# Patient Record
Sex: FEMALE | Race: ASIAN | NOT HISPANIC OR LATINO | Employment: OTHER | URBAN - METROPOLITAN AREA
[De-identification: names, ages, dates, MRNs, and addresses within clinical notes are randomized per-mention and may not be internally consistent; named-entity substitution may affect disease eponyms.]

---

## 2018-10-25 ENCOUNTER — CONVERSION ENCOUNTER (OUTPATIENT)
Dept: FAMILY MEDICINE CLINIC | Facility: CLINIC | Age: 58
End: 2018-10-25

## 2018-10-25 ENCOUNTER — OFFICE VISIT CONVERTED (OUTPATIENT)
Dept: FAMILY MEDICINE CLINIC | Facility: CLINIC | Age: 58
End: 2018-10-25
Attending: NURSE PRACTITIONER

## 2018-11-07 ENCOUNTER — OFFICE VISIT CONVERTED (OUTPATIENT)
Dept: ORTHOPEDIC SURGERY | Facility: CLINIC | Age: 58
End: 2018-11-07
Attending: ORTHOPAEDIC SURGERY

## 2018-11-07 ENCOUNTER — CONVERSION ENCOUNTER (OUTPATIENT)
Dept: ORTHOPEDIC SURGERY | Facility: CLINIC | Age: 58
End: 2018-11-07

## 2019-04-25 ENCOUNTER — HOSPITAL ENCOUNTER (OUTPATIENT)
Dept: LAB | Facility: HOSPITAL | Age: 59
Discharge: HOME | End: 2019-04-25
Attending: NURSE PRACTITIONER

## 2019-04-25 ENCOUNTER — HOSPITAL ENCOUNTER (OUTPATIENT)
Dept: OTHER | Facility: HOSPITAL | Age: 59
Discharge: HOME | End: 2019-04-25
Attending: NURSE PRACTITIONER

## 2019-04-25 ENCOUNTER — CONVERSION ENCOUNTER (OUTPATIENT)
Dept: FAMILY MEDICINE CLINIC | Facility: CLINIC | Age: 59
End: 2019-04-25

## 2019-04-25 ENCOUNTER — OFFICE VISIT CONVERTED (OUTPATIENT)
Dept: FAMILY MEDICINE CLINIC | Facility: CLINIC | Age: 59
End: 2019-04-25
Attending: NURSE PRACTITIONER

## 2019-04-25 ENCOUNTER — HOSPITAL ENCOUNTER (OUTPATIENT)
Dept: GENERAL RADIOLOGY | Facility: HOSPITAL | Age: 59
Discharge: HOME | End: 2019-04-25
Attending: NURSE PRACTITIONER

## 2019-04-25 LAB
ALBUMIN SERPL-MCNC: 4.6 G/DL (ref 3.5–5)
ALBUMIN/GLOB SERPL: 1.5 {RATIO} (ref 1.4–2.6)
ALP SERPL-CCNC: 61 U/L (ref 53–141)
ALT SERPL-CCNC: 27 U/L (ref 10–40)
ANION GAP SERPL CALC-SCNC: 14 MMOL/L (ref 8–19)
AST SERPL-CCNC: 24 U/L (ref 15–50)
BILIRUB SERPL-MCNC: 0.62 MG/DL (ref 0.2–1.3)
BUN SERPL-MCNC: 15 MG/DL (ref 5–25)
BUN/CREAT SERPL: 19 {RATIO} (ref 6–20)
CALCIUM SERPL-MCNC: 9.6 MG/DL (ref 8.7–10.4)
CHLORIDE SERPL-SCNC: 104 MMOL/L (ref 99–111)
CHOLEST SERPL-MCNC: 172 MG/DL (ref 107–200)
CHOLEST/HDLC SERPL: 4.1 {RATIO} (ref 3–6)
CONV CO2: 26 MMOL/L (ref 22–32)
CONV TOTAL PROTEIN: 7.7 G/DL (ref 6.3–8.2)
CREAT UR-MCNC: 0.79 MG/DL (ref 0.5–0.9)
GFR SERPLBLD BASED ON 1.73 SQ M-ARVRAT: >60 ML/MIN/{1.73_M2}
GLOBULIN UR ELPH-MCNC: 3.1 G/DL (ref 2–3.5)
GLUCOSE SERPL-MCNC: 105 MG/DL (ref 65–99)
HDLC SERPL-MCNC: 42 MG/DL (ref 40–60)
LDLC SERPL CALC-MCNC: 100 MG/DL (ref 70–100)
OSMOLALITY SERPL CALC.SUM OF ELEC: 291 MOSM/KG (ref 273–304)
POTASSIUM SERPL-SCNC: 4 MMOL/L (ref 3.5–5.3)
SODIUM SERPL-SCNC: 140 MMOL/L (ref 135–147)
TRIGL SERPL-MCNC: 152 MG/DL (ref 40–150)
VLDLC SERPL-MCNC: 30 MG/DL (ref 5–37)

## 2019-04-26 LAB
CONV HEPATITIS C AB WITH REFLEX TO CONFIRMATION: <0.1 S/CO RATIO (ref 0–0.9)
CONV HEPATITIS COMMENT: NORMAL

## 2019-04-30 LAB
CONV LAST MENSTURAL PERIOD: NORMAL
SPECIMEN SOURCE: NORMAL
SPECIMEN SOURCE: NORMAL
THIN PREP CVX: NORMAL

## 2019-05-09 ENCOUNTER — OFFICE VISIT CONVERTED (OUTPATIENT)
Dept: FAMILY MEDICINE CLINIC | Facility: CLINIC | Age: 59
End: 2019-05-09
Attending: NURSE PRACTITIONER

## 2019-05-09 ENCOUNTER — CONVERSION ENCOUNTER (OUTPATIENT)
Dept: FAMILY MEDICINE CLINIC | Facility: CLINIC | Age: 59
End: 2019-05-09

## 2019-06-03 ENCOUNTER — OFFICE VISIT CONVERTED (OUTPATIENT)
Dept: FAMILY MEDICINE CLINIC | Facility: CLINIC | Age: 59
End: 2019-06-03
Attending: NURSE PRACTITIONER

## 2019-07-08 ENCOUNTER — HOSPITAL ENCOUNTER (OUTPATIENT)
Dept: LAB | Facility: HOSPITAL | Age: 59
Discharge: HOME | End: 2019-07-08
Attending: INTERNAL MEDICINE

## 2019-07-08 LAB
ALBUMIN SERPL-MCNC: 4.6 G/DL (ref 3.5–5)
ALBUMIN/GLOB SERPL: 1.6 {RATIO} (ref 1.4–2.6)
ALP SERPL-CCNC: 64 U/L (ref 53–141)
ALT SERPL-CCNC: 30 U/L (ref 10–40)
ANION GAP SERPL CALC-SCNC: 19 MMOL/L (ref 8–19)
AST SERPL-CCNC: 26 U/L (ref 15–50)
BILIRUB SERPL-MCNC: 0.46 MG/DL (ref 0.2–1.3)
BUN SERPL-MCNC: 8 MG/DL (ref 5–25)
BUN/CREAT SERPL: 12 {RATIO} (ref 6–20)
CALCIUM SERPL-MCNC: 9.3 MG/DL (ref 8.7–10.4)
CHLORIDE SERPL-SCNC: 102 MMOL/L (ref 99–111)
CHOLEST SERPL-MCNC: 198 MG/DL (ref 107–200)
CHOLEST/HDLC SERPL: 4.8 {RATIO} (ref 3–6)
CONV CO2: 23 MMOL/L (ref 22–32)
CONV TOTAL PROTEIN: 7.5 G/DL (ref 6.3–8.2)
CREAT UR-MCNC: 0.68 MG/DL (ref 0.5–0.9)
GFR SERPLBLD BASED ON 1.73 SQ M-ARVRAT: >60 ML/MIN/{1.73_M2}
GLOBULIN UR ELPH-MCNC: 2.9 G/DL (ref 2–3.5)
GLUCOSE SERPL-MCNC: 101 MG/DL (ref 65–99)
HDLC SERPL-MCNC: 41 MG/DL (ref 40–60)
LDLC SERPL CALC-MCNC: 110 MG/DL (ref 70–100)
OSMOLALITY SERPL CALC.SUM OF ELEC: 288 MOSM/KG (ref 273–304)
POTASSIUM SERPL-SCNC: 4.4 MMOL/L (ref 3.5–5.3)
SODIUM SERPL-SCNC: 140 MMOL/L (ref 135–147)
TRIGL SERPL-MCNC: 237 MG/DL (ref 40–150)
VLDLC SERPL-MCNC: 47 MG/DL (ref 5–37)

## 2019-07-17 ENCOUNTER — HOSPITAL ENCOUNTER (OUTPATIENT)
Dept: GENERAL RADIOLOGY | Facility: HOSPITAL | Age: 59
Discharge: HOME | End: 2019-07-17
Attending: NURSE PRACTITIONER

## 2019-10-28 ENCOUNTER — OFFICE VISIT CONVERTED (OUTPATIENT)
Dept: FAMILY MEDICINE CLINIC | Facility: CLINIC | Age: 59
End: 2019-10-28
Attending: NURSE PRACTITIONER

## 2019-10-28 ENCOUNTER — CONVERSION ENCOUNTER (OUTPATIENT)
Dept: FAMILY MEDICINE CLINIC | Facility: CLINIC | Age: 59
End: 2019-10-28

## 2019-10-28 ENCOUNTER — HOSPITAL ENCOUNTER (OUTPATIENT)
Dept: LAB | Facility: HOSPITAL | Age: 59
Discharge: HOME | End: 2019-10-28
Attending: NURSE PRACTITIONER

## 2019-10-28 LAB
ALBUMIN SERPL-MCNC: 4.6 G/DL (ref 3.5–5)
ALBUMIN/GLOB SERPL: 1.5 {RATIO} (ref 1.4–2.6)
ALP SERPL-CCNC: 62 U/L (ref 53–141)
ALT SERPL-CCNC: 25 U/L (ref 10–40)
ANION GAP SERPL CALC-SCNC: 15 MMOL/L (ref 8–19)
AST SERPL-CCNC: 20 U/L (ref 15–50)
BASOPHILS # BLD AUTO: 0.05 10*3/UL (ref 0–0.2)
BASOPHILS NFR BLD AUTO: 1.1 % (ref 0–3)
BILIRUB SERPL-MCNC: 0.48 MG/DL (ref 0.2–1.3)
BUN SERPL-MCNC: 13 MG/DL (ref 5–25)
BUN/CREAT SERPL: 18 {RATIO} (ref 6–20)
CALCIUM SERPL-MCNC: 9.7 MG/DL (ref 8.7–10.4)
CHLORIDE SERPL-SCNC: 103 MMOL/L (ref 99–111)
CHOLEST SERPL-MCNC: 186 MG/DL (ref 107–200)
CHOLEST/HDLC SERPL: 4.7 {RATIO} (ref 3–6)
CONV ABS IMM GRAN: 0.02 10*3/UL (ref 0–0.2)
CONV CO2: 26 MMOL/L (ref 22–32)
CONV IMMATURE GRAN: 0.5 % (ref 0–1.8)
CONV TOTAL PROTEIN: 7.6 G/DL (ref 6.3–8.2)
CREAT UR-MCNC: 0.72 MG/DL (ref 0.5–0.9)
DEPRECATED RDW RBC AUTO: 40.6 FL (ref 36.4–46.3)
EOSINOPHIL # BLD AUTO: 0.17 10*3/UL (ref 0–0.7)
EOSINOPHIL # BLD AUTO: 3.8 % (ref 0–7)
ERYTHROCYTE [DISTWIDTH] IN BLOOD BY AUTOMATED COUNT: 12.5 % (ref 11.7–14.4)
GFR SERPLBLD BASED ON 1.73 SQ M-ARVRAT: >60 ML/MIN/{1.73_M2}
GLOBULIN UR ELPH-MCNC: 3 G/DL (ref 2–3.5)
GLUCOSE SERPL-MCNC: 105 MG/DL (ref 65–99)
HCT VFR BLD AUTO: 42 % (ref 37–47)
HDLC SERPL-MCNC: 40 MG/DL (ref 40–60)
HGB BLD-MCNC: 13.6 G/DL (ref 12–16)
LDLC SERPL CALC-MCNC: 95 MG/DL (ref 70–100)
LYMPHOCYTES # BLD AUTO: 1.72 10*3/UL (ref 1–5)
LYMPHOCYTES NFR BLD AUTO: 38.8 % (ref 20–45)
MCH RBC QN AUTO: 28.6 PG (ref 27–31)
MCHC RBC AUTO-ENTMCNC: 32.4 G/DL (ref 33–37)
MCV RBC AUTO: 88.2 FL (ref 81–99)
MONOCYTES # BLD AUTO: 0.36 10*3/UL (ref 0.2–1.2)
MONOCYTES NFR BLD AUTO: 8.1 % (ref 3–10)
NEUTROPHILS # BLD AUTO: 2.11 10*3/UL (ref 2–8)
NEUTROPHILS NFR BLD AUTO: 47.7 % (ref 30–85)
NRBC CBCN: 0 % (ref 0–0.7)
OSMOLALITY SERPL CALC.SUM OF ELEC: 290 MOSM/KG (ref 273–304)
PLATELET # BLD AUTO: 182 10*3/UL (ref 130–400)
PMV BLD AUTO: 11.9 FL (ref 9.4–12.3)
POTASSIUM SERPL-SCNC: 4.3 MMOL/L (ref 3.5–5.3)
RBC # BLD AUTO: 4.76 10*6/UL (ref 4.2–5.4)
SODIUM SERPL-SCNC: 140 MMOL/L (ref 135–147)
T4 FREE SERPL-MCNC: 1 NG/DL (ref 0.9–1.8)
TRIGL SERPL-MCNC: 255 MG/DL (ref 40–150)
TSH SERPL-ACNC: 2.3 M[IU]/L (ref 0.27–4.2)
VLDLC SERPL-MCNC: 51 MG/DL (ref 5–37)
WBC # BLD AUTO: 4.43 10*3/UL (ref 4.8–10.8)

## 2020-01-14 ENCOUNTER — OFFICE VISIT CONVERTED (OUTPATIENT)
Dept: FAMILY MEDICINE CLINIC | Facility: CLINIC | Age: 60
End: 2020-01-14
Attending: NURSE PRACTITIONER

## 2020-03-10 ENCOUNTER — OFFICE VISIT CONVERTED (OUTPATIENT)
Dept: FAMILY MEDICINE CLINIC | Facility: CLINIC | Age: 60
End: 2020-03-10
Attending: NURSE PRACTITIONER

## 2020-05-04 ENCOUNTER — OFFICE VISIT CONVERTED (OUTPATIENT)
Dept: FAMILY MEDICINE CLINIC | Facility: CLINIC | Age: 60
End: 2020-05-04
Attending: NURSE PRACTITIONER

## 2020-05-04 ENCOUNTER — HOSPITAL ENCOUNTER (OUTPATIENT)
Dept: FAMILY MEDICINE CLINIC | Facility: CLINIC | Age: 60
Discharge: HOME | End: 2020-05-04
Attending: NURSE PRACTITIONER

## 2020-05-06 LAB
AMOXICILLIN+CLAV SUSC ISLT: <=2
AMPICILLIN SUSC ISLT: <=2
AMPICILLIN+SULBAC SUSC ISLT: <=2
BACTERIA UR CULT: ABNORMAL
CEFAZOLIN SUSC ISLT: <=4
CEFEPIME SUSC ISLT: <=1
CEFTAZIDIME SUSC ISLT: <=1
CEFTRIAXONE SUSC ISLT: <=1
CEFUROXIME ORAL SUSC ISLT: 4
CEFUROXIME PARENTER SUSC ISLT: 4
CIPROFLOXACIN SUSC ISLT: <=0.25
ERTAPENEM SUSC ISLT: <=0.5
GENTAMICIN SUSC ISLT: <=1
LEVOFLOXACIN SUSC ISLT: <=0.12
NITROFURANTOIN SUSC ISLT: <=16
TETRACYCLINE SUSC ISLT: <=1
TMP SMX SUSC ISLT: <=20
TOBRAMYCIN SUSC ISLT: <=1

## 2020-07-02 ENCOUNTER — HOSPITAL ENCOUNTER (OUTPATIENT)
Dept: LAB | Facility: HOSPITAL | Age: 60
Discharge: HOME | End: 2020-07-02
Attending: INTERNAL MEDICINE

## 2020-07-02 LAB
ALBUMIN SERPL-MCNC: 4.5 G/DL (ref 3.5–5)
ALBUMIN/GLOB SERPL: 1.6 {RATIO} (ref 1.4–2.6)
ALP SERPL-CCNC: 57 U/L (ref 53–141)
ALT SERPL-CCNC: 28 U/L (ref 10–40)
ANION GAP SERPL CALC-SCNC: 17 MMOL/L (ref 8–19)
AST SERPL-CCNC: 22 U/L (ref 15–50)
BILIRUB SERPL-MCNC: 0.72 MG/DL (ref 0.2–1.3)
BUN SERPL-MCNC: 12 MG/DL (ref 5–25)
BUN/CREAT SERPL: 14 {RATIO} (ref 6–20)
CALCIUM SERPL-MCNC: 9.7 MG/DL (ref 8.7–10.4)
CHLORIDE SERPL-SCNC: 103 MMOL/L (ref 99–111)
CHOLEST SERPL-MCNC: 171 MG/DL (ref 107–200)
CHOLEST/HDLC SERPL: 3.6 {RATIO} (ref 3–6)
CONV CO2: 23 MMOL/L (ref 22–32)
CONV TOTAL PROTEIN: 7.3 G/DL (ref 6.3–8.2)
CREAT UR-MCNC: 0.84 MG/DL (ref 0.5–0.9)
GFR SERPLBLD BASED ON 1.73 SQ M-ARVRAT: >60 ML/MIN/{1.73_M2}
GLOBULIN UR ELPH-MCNC: 2.8 G/DL (ref 2–3.5)
GLUCOSE SERPL-MCNC: 99 MG/DL (ref 65–99)
HDLC SERPL-MCNC: 47 MG/DL (ref 40–60)
LDLC SERPL CALC-MCNC: 93 MG/DL (ref 70–100)
OSMOLALITY SERPL CALC.SUM OF ELEC: 288 MOSM/KG (ref 273–304)
POTASSIUM SERPL-SCNC: 4 MMOL/L (ref 3.5–5.3)
SODIUM SERPL-SCNC: 139 MMOL/L (ref 135–147)
TRIGL SERPL-MCNC: 153 MG/DL (ref 40–150)
VLDLC SERPL-MCNC: 31 MG/DL (ref 5–37)

## 2020-07-21 ENCOUNTER — HOSPITAL ENCOUNTER (OUTPATIENT)
Dept: LAB | Facility: HOSPITAL | Age: 60
Discharge: HOME | End: 2020-07-21
Attending: INTERNAL MEDICINE

## 2020-07-21 LAB — BNP SERPL-MCNC: 31 PG/ML (ref 0–900)

## 2020-07-22 LAB
T4 FREE SERPL-MCNC: 1.2 NG/DL (ref 0.9–1.8)
TSH SERPL-ACNC: 1.11 M[IU]/L (ref 0.27–4.2)

## 2020-10-01 ENCOUNTER — OFFICE VISIT CONVERTED (OUTPATIENT)
Dept: FAMILY MEDICINE CLINIC | Facility: CLINIC | Age: 60
End: 2020-10-01
Attending: NURSE PRACTITIONER

## 2021-01-05 ENCOUNTER — OFFICE VISIT CONVERTED (OUTPATIENT)
Dept: FAMILY MEDICINE CLINIC | Facility: CLINIC | Age: 61
End: 2021-01-05
Attending: NURSE PRACTITIONER

## 2021-01-25 ENCOUNTER — HOSPITAL ENCOUNTER (OUTPATIENT)
Dept: GENERAL RADIOLOGY | Facility: HOSPITAL | Age: 61
Discharge: HOME | End: 2021-01-25
Attending: NURSE PRACTITIONER

## 2021-01-25 ENCOUNTER — HOSPITAL ENCOUNTER (OUTPATIENT)
Dept: LAB | Facility: HOSPITAL | Age: 61
Discharge: HOME | End: 2021-01-25
Attending: INTERNAL MEDICINE

## 2021-01-25 LAB
ALBUMIN SERPL-MCNC: 4.7 G/DL (ref 3.5–5)
ALBUMIN/GLOB SERPL: 1.5 {RATIO} (ref 1.4–2.6)
ALP SERPL-CCNC: 68 U/L (ref 53–141)
ALT SERPL-CCNC: 32 U/L (ref 10–40)
ANION GAP SERPL CALC-SCNC: 16 MMOL/L (ref 8–19)
AST SERPL-CCNC: 20 U/L (ref 15–50)
BILIRUB SERPL-MCNC: 0.51 MG/DL (ref 0.2–1.3)
BUN SERPL-MCNC: 12 MG/DL (ref 5–25)
BUN/CREAT SERPL: 16 {RATIO} (ref 6–20)
CALCIUM SERPL-MCNC: 9.7 MG/DL (ref 8.7–10.4)
CHLORIDE SERPL-SCNC: 103 MMOL/L (ref 99–111)
CHOLEST SERPL-MCNC: 208 MG/DL (ref 107–200)
CHOLEST/HDLC SERPL: 3.9 {RATIO} (ref 3–6)
CONV CO2: 26 MMOL/L (ref 22–32)
CONV TOTAL PROTEIN: 7.8 G/DL (ref 6.3–8.2)
CREAT UR-MCNC: 0.76 MG/DL (ref 0.5–0.9)
GFR SERPLBLD BASED ON 1.73 SQ M-ARVRAT: >60 ML/MIN/{1.73_M2}
GLOBULIN UR ELPH-MCNC: 3.1 G/DL (ref 2–3.5)
GLUCOSE SERPL-MCNC: 95 MG/DL (ref 65–99)
HDLC SERPL-MCNC: 54 MG/DL (ref 40–60)
LDLC SERPL CALC-MCNC: 113 MG/DL (ref 70–100)
OSMOLALITY SERPL CALC.SUM OF ELEC: 292 MOSM/KG (ref 273–304)
POTASSIUM SERPL-SCNC: 4.1 MMOL/L (ref 3.5–5.3)
SODIUM SERPL-SCNC: 141 MMOL/L (ref 135–147)
TRIGL SERPL-MCNC: 207 MG/DL (ref 40–150)
VLDLC SERPL-MCNC: 41 MG/DL (ref 5–37)

## 2021-03-16 ENCOUNTER — HOSPITAL ENCOUNTER (OUTPATIENT)
Dept: VACCINE CLINIC | Facility: HOSPITAL | Age: 61
Discharge: HOME | End: 2021-03-16
Attending: INTERNAL MEDICINE

## 2021-04-01 ENCOUNTER — OFFICE VISIT CONVERTED (OUTPATIENT)
Dept: FAMILY MEDICINE CLINIC | Facility: CLINIC | Age: 61
End: 2021-04-01
Attending: NURSE PRACTITIONER

## 2021-04-02 ENCOUNTER — HOSPITAL ENCOUNTER (OUTPATIENT)
Dept: LAB | Facility: HOSPITAL | Age: 61
Discharge: HOME | End: 2021-04-02
Attending: NURSE PRACTITIONER

## 2021-04-02 LAB
ALBUMIN SERPL-MCNC: 4.4 G/DL (ref 3.5–5)
ALBUMIN/GLOB SERPL: 1.3 {RATIO} (ref 1.4–2.6)
ALP SERPL-CCNC: 62 U/L (ref 53–141)
ALT SERPL-CCNC: 35 U/L (ref 10–40)
ANION GAP SERPL CALC-SCNC: 16 MMOL/L (ref 8–19)
AST SERPL-CCNC: 27 U/L (ref 15–50)
BASOPHILS # BLD AUTO: 0.04 10*3/UL (ref 0–0.2)
BASOPHILS NFR BLD AUTO: 0.7 % (ref 0–3)
BILIRUB SERPL-MCNC: 0.66 MG/DL (ref 0.2–1.3)
BUN SERPL-MCNC: 8 MG/DL (ref 5–25)
BUN/CREAT SERPL: 10 {RATIO} (ref 6–20)
CALCIUM SERPL-MCNC: 9.9 MG/DL (ref 8.7–10.4)
CHLORIDE SERPL-SCNC: 102 MMOL/L (ref 99–111)
CHOLEST SERPL-MCNC: 170 MG/DL (ref 107–200)
CHOLEST/HDLC SERPL: 3.8 {RATIO} (ref 3–6)
CONV ABS IMM GRAN: 0.03 10*3/UL (ref 0–0.2)
CONV CO2: 29 MMOL/L (ref 22–32)
CONV IMMATURE GRAN: 0.5 % (ref 0–1.8)
CONV TOTAL PROTEIN: 7.9 G/DL (ref 6.3–8.2)
CREAT UR-MCNC: 0.78 MG/DL (ref 0.5–0.9)
DEPRECATED RDW RBC AUTO: 42 FL (ref 36.4–46.3)
EOSINOPHIL # BLD AUTO: 0.11 10*3/UL (ref 0–0.7)
EOSINOPHIL # BLD AUTO: 1.8 % (ref 0–7)
ERYTHROCYTE [DISTWIDTH] IN BLOOD BY AUTOMATED COUNT: 13 % (ref 11.7–14.4)
GFR SERPLBLD BASED ON 1.73 SQ M-ARVRAT: >60 ML/MIN/{1.73_M2}
GLOBULIN UR ELPH-MCNC: 3.5 G/DL (ref 2–3.5)
GLUCOSE SERPL-MCNC: 92 MG/DL (ref 65–99)
HCT VFR BLD AUTO: 41.5 % (ref 37–47)
HDLC SERPL-MCNC: 45 MG/DL (ref 40–60)
HGB BLD-MCNC: 13.7 G/DL (ref 12–16)
LDLC SERPL CALC-MCNC: 86 MG/DL (ref 70–100)
LYMPHOCYTES # BLD AUTO: 1.75 10*3/UL (ref 1–5)
LYMPHOCYTES NFR BLD AUTO: 29 % (ref 20–45)
MCH RBC QN AUTO: 29.3 PG (ref 27–31)
MCHC RBC AUTO-ENTMCNC: 33 G/DL (ref 33–37)
MCV RBC AUTO: 88.7 FL (ref 81–99)
MONOCYTES # BLD AUTO: 0.38 10*3/UL (ref 0.2–1.2)
MONOCYTES NFR BLD AUTO: 6.3 % (ref 3–10)
NEUTROPHILS # BLD AUTO: 3.72 10*3/UL (ref 2–8)
NEUTROPHILS NFR BLD AUTO: 61.7 % (ref 30–85)
NRBC CBCN: 0 % (ref 0–0.7)
OSMOLALITY SERPL CALC.SUM OF ELEC: 294 MOSM/KG (ref 273–304)
PLATELET # BLD AUTO: 218 10*3/UL (ref 130–400)
PMV BLD AUTO: 11.7 FL (ref 9.4–12.3)
POTASSIUM SERPL-SCNC: 4.1 MMOL/L (ref 3.5–5.3)
RBC # BLD AUTO: 4.68 10*6/UL (ref 4.2–5.4)
SODIUM SERPL-SCNC: 143 MMOL/L (ref 135–147)
T4 FREE SERPL-MCNC: 1.2 NG/DL (ref 0.9–1.8)
TRIGL SERPL-MCNC: 194 MG/DL (ref 40–150)
TSH SERPL-ACNC: 1.36 M[IU]/L (ref 0.27–4.2)
VLDLC SERPL-MCNC: 39 MG/DL (ref 5–37)
WBC # BLD AUTO: 6.03 10*3/UL (ref 4.8–10.8)

## 2021-04-06 ENCOUNTER — HOSPITAL ENCOUNTER (OUTPATIENT)
Dept: VACCINE CLINIC | Facility: HOSPITAL | Age: 61
Discharge: HOME | End: 2021-04-06
Attending: INTERNAL MEDICINE

## 2021-05-04 ENCOUNTER — CONVERSION ENCOUNTER (OUTPATIENT)
Dept: FAMILY MEDICINE CLINIC | Facility: CLINIC | Age: 61
End: 2021-05-04

## 2021-05-04 ENCOUNTER — HOSPITAL ENCOUNTER (OUTPATIENT)
Dept: FAMILY MEDICINE CLINIC | Facility: CLINIC | Age: 61
Discharge: HOME | End: 2021-05-04
Attending: NURSE PRACTITIONER

## 2021-05-04 ENCOUNTER — OFFICE VISIT CONVERTED (OUTPATIENT)
Dept: FAMILY MEDICINE CLINIC | Facility: CLINIC | Age: 61
End: 2021-05-04
Attending: NURSE PRACTITIONER

## 2021-05-04 LAB
BILIRUB UR QL STRIP: NEGATIVE
COLOR UR: NORMAL
CONV CLARITY OF URINE: CLEAR
CONV PROTEIN IN URINE BY AUTOMATED TEST STRIP: NEGATIVE
CONV UROBILINOGEN IN URINE BY AUTOMATED TEST STRIP: NORMAL
GLUCOSE UR QL: NEGATIVE
HGB UR QL STRIP: NEGATIVE
KETONES UR QL STRIP: NEGATIVE
LEUKOCYTE ESTERASE UR QL STRIP: NEGATIVE
NITRITE UR QL STRIP: NEGATIVE
PH UR STRIP.AUTO: 6.5 [PH]
SP GR UR: 1.02

## 2021-05-13 NOTE — PROGRESS NOTES
Progress Note      Patient Name: Panda Rinaldi   Patient ID: [id number]   Sex: Female   YOB: 1960    Primary Care Provider: Sadie JOSHI   Referring Provider: Sadie JOSHI    Visit Date: October 1, 2020    Provider: MADELYN Mendiola   Location: [facility]   Location Address: [address] [place] [address], [address]  [place]  [id number]   Location Phone: [phone]          Chief Complaint  · f/u      History Of Present Illness  Panda Rinaldi is a 60 year old  female who presents for evaluation and treatment of:      Pt is here for a f/u. She will have her MAW visit today as well. Pt has c/o possible ingrown toe nail on right foot - big toe. She states it has been painful for months and she has tried cutting it out herself, but no relief.     Pt has c/o possible arthritis in right hand. She states it hurts and sometimes she will drop things due to the pain.    Pt is due mammogram.     Pt had recent labs with Cardiologist. Records in chart.           Past Medical History  Disease Name Date Onset Notes   Arthritis --  --    Bone Density Screening 10/15/14 Osteoporosis- Tried Fosamax in the past   Cervical cancer screening 4/25/19 2016   Heart Disease --  --    Hyperlipemia --  --    Palpitations --  --    Pap smear for cervical cancer screening 4/30/19 --    Pap Smear for Vaginal Cancer Screening 4/30/19 --    Reflux --  --    Screening Mammogram 4/30/18 [facility]   Seasonal allergies --  --    Shoulder pain --  --          Past Surgical History  Procedure Name Date Notes   Colonoscopy 10/2015 [facility]   Shoulder surgery 2014 [facility] Rt Shoulder         Medication List  Name Date Started Instructions   cyclobenzaprine 10 mg oral tablet 01/14/2020 take 1 tablet (10 mg) by oral route 3 times per day as needed   Flonase Allergy Relief 50 mcg/actuation nasal spray,suspension 01/14/2020 spray 1 - 2 sprays (50 - 100 mcg) in each nostril by intranasal route once daily   ibuprofen 200  mg oral tablet 04/25/2019 take 1 tablet (200 mg) by oral route every 4-6 hours as needed with food   Lipitor 20 mg oral tablet 10/01/2020 take 1 tablet (20 mg) by oral route once daily for 90 days   Mobic 15 mg oral tablet 10/01/2020 take 1 tablet (15 mg) by oral route once daily for 90 days   Nexium 40 mg oral capsule,delayed release(DR/EC) 10/01/2020 take 1 capsule (40 mg) by oral route once daily for 90 days   Singulair 10 mg oral tablet 10/01/2020 take 1 tablet (10 mg) by oral route once daily in the evening for 90 days   Toprol XL 25 mg oral tablet extended release 24 hr 10/01/2020 take 1 tablet (25 mg) by oral route once daily for 90 days   Zyrtec 10 mg oral tablet 10/01/2020 take 1 tablet (10 mg) by oral route once daily for 90 days         Allergy List  Allergen Name Date Reaction Notes   Azasite --  --  --    hydrocodone-acetaminophen --  SOB/Vomiting/Numbness of body --    tramadol --  SOB/Vomiting/Numbness of body --        Allergies Reconciled  Family Medical History  Disease Name Relative/Age Notes   Stroke  --    Heart Disease Father/   Father   Cancer, Unspecified Mother/  Sister/   Mother; Sister   Breast Neoplasm  --    Heart Attack (MI) Mother/   --          Social History  Finding Status Start/Stop Quantity Notes   Alcohol Never --/-- --  --    Homemaker. --  --/-- --  --    lives with spouse --  --/-- --  --     --  --/-- --  --    Recreational Drug Use Never --/-- --  no   Tobacco Never --/-- --  never smoker         Immunizations  NameDate Admin Mfg Trade Name Lot Number Route Inj VIS Given VIS Publication   Urnhsphdi80/01/2020 [facility] Fluzone Quadrivalent [id number] IM LD 10/01/2020 08/15/2019   Comments: pt tolerated inj well         Review of Systems  · Constitutional  o Denies  o : fever, fatigue, weight loss, weight gain  · Breasts  o Denies  o : lumps, tenderness, swelling, nipple discharge  · Cardiovascular  o Denies  o : lower extremity edema, claudication, chest pressure,  "palpitations  · Respiratory  o Denies  o : shortness of breath, wheezing, cough, hemoptysis, dyspnea on exertion  · Gastrointestinal  o Denies  o : nausea, vomiting, diarrhea, constipation, abdominal pain  · Musculoskeletal  o Admits  o : joint pain, pain in right great toe by nail      Vitals  Date Time BP Position Site L\R Cuff Size HR RR TEMP (F) WT  HT  BMI kg/m2 BSA m2 O2 Sat FR L/min FiO2 HC       03/10/2020 09:31 /84 Sitting    80 - R  98.4 138lbs 0oz 5'  4\" 23.69 1.68 97 %      05/04/2020 08:52 /76 Sitting    76 - R  97.1 135lbs 0oz 5'  4\" 23.17 1.66 97 %      10/01/2020 11:44 /76 Sitting    65 - R   138lbs 6oz 5'  4\" 23.75 1.68 97 %  21%          Physical Examination  · Constitutional  o Appearance  o : well-nourished, well developed, alert, in no acute distress  · Ears, Nose, Mouth and Throat  o Ears  o :   § External Ears  § : appearance within normal limits, no lesions present  § Otoscopic Examination  § : tympanic membrane appearance within normal limits bilaterally without perforations, mobility normal  o Nose  o :   § External Nose  § : normal stucture noted.  § Intranasal Exam  § : no swelling, reddness, turbs normal alie.  o Oral Cavity  o :   § Oral Mucosa  § : oral mucosa normal without pallor or cyanosis  § Lips  § : lip appearance normal  § Teeth  § : normal dentition for age  § Gums  § : gums pink, non-swollen, no bleeding present  § Tongue  § : tongue appearance normal  § Palate  § : hard palate normal, soft palate appearance normal  o Throat  o :   § Oropharynx  § : no inflammation or lesions present, tonsils within normal limits  · Respiratory  o Respiratory Effort  o : breathing unlabored  o Auscultation of Lungs  o : normal breath sounds throughout  · Cardiovascular  o Heart  o :   § Auscultation of Heart  § : regular rate and rhythm, no murmurs, gallops or rubs  § Palpation of Heart  § : normal apical impulse, no cardiac thrill present  o Peripheral Vascular System  o : "   § Extremities  § : no cyanosis, clubbing or edema; less than 2 second refill noted  · Gastrointestinal  o Abdominal Examination  o : abdomen nontender to palpation, tone normal without rigidity or guarding, no masses present, abdominal contour scaphoid  o Liver and spleen  o : no hepatomegaly present, liver nontender to palpation, spleen not palpable  · Musculoskeletal  o Right Upper Extremity  o :   § Inspection/Palpation  § : no tenderness to palpation  § Range of Motion  § : range of motion normal, no joint crepitus or pain with motion present  o Left Upper Extremity  o :   § Inspection/Palpation  § : no tenderness to palpation  § Range of Motion  § : range of motion normal, no joint crepitus present, no pain with joint motion  o Right Lower Extremity  o :   § Inspection/Palpation  § : no joint or limb tenderness to palpation  § Range of Motion  § : range of motion normal, no joint crepitations present, no pain on motion  o Left Lower Extremity  o :   § Inspection/Palpation  § : no joint or limb tenderness to palpation  § Range of Motion  § : range of motion normal, no joint crepitations present, no pain on motion  · Skin and Subcutaneous Tissue  o General Inspection  o : no rashes or lesions present, no areas of discoloration, right great toe tender to touch on lateral side of nail, no drainage, appears ingrown  · Neurologic  o Mental Status Examination  o :   § Orientation  § : grossly oriented to person, place and time  o Cranial Nerves  o : cranial nerves intact and symmetric throughout  · Psychiatric  o Mood and Affect  o : mood normal, affect appropriate, denies any SI/HI          Assessment  · Allergic rhinitis due to allergen     477.9/J30.9  · Essential hypertension     401.9/I10  · GERD (gastroesophageal reflux disease)     530.81/K21.9  · Hyperlipidemia     272.4/E78.5  · Need for influenza vaccination     V04.81/Z23  · Visit for screening  mammogram     V76.12/Z12.31  · Arthritis     716.90/M19.90  · Ingrown toenail of right foot     703.0/L60.0      Plan  · Orders  o Immunization Admin Fee (Single) ([facility]) (70741) - V04.81/Z23 - 10/01/2020  o Fluzone Quadrivalent Vaccine, age 6 months + (43890) - V04.81/Z23 - 10/01/2020   Vaccine - Influenza; Dose: 0.5; Site: Left Deltoid; Route: Intramuscular; Date: 10/01/2020 12:00:00; Exp: 06/30/2021; Lot: [id number]; Mfg: sanofi pasteur; TradeName: Fluzone Quadrivalent; Administered By: Radha Andrade MA; Comment: pt tolerated inj well  o Screening Mammography; Bilateral 3D (83250, , 34453) - V76.12/Z12.31 - 10/01/2020  o ACO-39: Current medications updated and reviewed (, 1159F) - - 10/01/2020  o ACO-14: Influenza immunization administered or previously received [facility] () - - 10/01/2020  o PODIATRY CONSULTATION ([facility]IA) - - 10/01/2020  · Medications  o Medications have been Reconciled  o Transition of Care or Provider Policy  · Instructions  o Patient advised to monitor blood pressure (B/P) at home and journal readings. Patient informed that a B/P reading at home of more than 130/80 is considered hypertension. For readings greater qnop977/90 or higher patient is advised to follow up in the office with readings for management. Patient advised to limit sodium intake.  o Advised that cheeses and other sources of dairy fats, animal fats, fast food, and the extras (candy, pastries, pies, doughnuts and cookies) all contain LDL raising nutrients. Advised to increase fruits, vegetables, whole grains, and to monitor portion sizes.   o Patient was educated/instructed on their diagnosis, treatment and medications prior to discharge from the clinic today.  o Patient instructed to seek medical attention urgently for new or worsening symptoms.  o Call the office with any concerns or questions.  · Disposition  o Call or Return if symptoms worsen or persist.  o Return Visit Request in/on 6 months +/- 2 weeks  (76897).            Electronically Signed by: MADELYN Mendiola -Author on October 1, 2020 12:06:26 PM

## 2021-05-13 NOTE — PROGRESS NOTES
Progress Note      Patient Name: Panda Rinaldi   Patient ID: [id number]   Sex: Female   YOB: 1960    Primary Care Provider: Sadie JOSHI   Referring Provider: Sadie JOSHI    Visit Date: October 1, 2020    Provider: MADELYN Mendiola   Location: [facility]   Location Address: [address] [place] [address], [address]  [place]  [id number]   Location Phone: [phone]          Chief Complaint  · Annual Wellness Exam      History Of Present Illness  The patient is a 60 year old  female who has come to this office for her Annual Wellness Visit.   Her Primary Care Provider is Sadie JOSHI. Her comprehensive Care Team list, including suppliers, has been updated on the Facesheet. Her medical/family history, height, weight, BMI, and blood pressure have been reviewed and are in the chart. The Health Risk Assessment has been completed and scanned in the chart.   Medications are listed in the medication list.   The active problem list includes: Hyperlipemia, Palpitations, and arthritis, essential hypertension, GERD   The patient does not have a history of substance use.   Patient reports her diet is adequate.   The Mini-Cog has been administered and is scanned in chart. The results are negative. Her cognitive function is without limitation.   A hearing loss screen was completed today and the result is negative.   Patient does not have any risk factors for depression. Patient completed the PHQ-9 today and it has been scanned in the chart. The total score is 1-4.   The Timed Up and Go screen was administered today and the result is negative.   The Duenas Index of [place] in ADLs indicated full function (score of 6).   A Falls Risk Assessment has been completed, including a review of home fall hazards and medication review.   Overall, the patient's functional ability is noted by this provider to be within normal limits. Her level of safety is noted to be within normal  limits. Her balance/gait is within normal limits. There have been no falls in the past year. Patient-specific home safety recommendations have been reviewed and a copy has been given to patient.   She denies issues with leaking urine.   There are no additional risk factors identified.   Living Will/Advanced Directive has not previously been completed.   Personalized health advice was given to the patient and a written health screening schedule was established; see Plan for details.       Past Medical History  Disease Name Date Onset Notes   Arthritis --  --    Bone Density Screening 10/15/14 Osteoporosis- Tried Fosamax in the past   Cervical cancer screening 4/25/19 2016   Heart Disease --  --    Hyperlipemia --  --    Palpitations --  --    Pap smear for cervical cancer screening 4/30/19 --    Pap Smear for Vaginal Cancer Screening 4/30/19 --    Reflux --  --    Screening Mammogram 4/30/18 [facility]   Seasonal allergies --  --    Shoulder pain --  --          Past Surgical History  Procedure Name Date Notes   Colonoscopy 10/2015 [facility]   Shoulder surgery 2014 [facility] Rt Shoulder         Medication List  Name Date Started Instructions   cyclobenzaprine 10 mg oral tablet 01/14/2020 take 1 tablet (10 mg) by oral route 3 times per day as needed   Flonase Allergy Relief 50 mcg/actuation nasal spray,suspension 01/14/2020 spray 1 - 2 sprays (50 - 100 mcg) in each nostril by intranasal route once daily   ibuprofen 200 mg oral tablet 04/25/2019 take 1 tablet (200 mg) by oral route every 4-6 hours as needed with food   Lipitor 20 mg oral tablet 10/01/2020 take 1 tablet (20 mg) by oral route once daily for 90 days   Mobic 15 mg oral tablet 10/01/2020 take 1 tablet (15 mg) by oral route once daily for 90 days   Nexium 40 mg oral capsule,delayed release(DR/EC) 10/01/2020 take 1 capsule (40 mg) by oral route once daily for 90 days   Singulair 10 mg oral tablet 10/01/2020 take 1 tablet (10 mg) by oral route once daily in the evening  "for 90 days   Toprol XL 25 mg oral tablet extended release 24 hr 10/01/2020 take 1 tablet (25 mg) by oral route once daily for 90 days   Zyrtec 10 mg oral tablet 10/01/2020 take 1 tablet (10 mg) by oral route once daily for 90 days         Allergy List  Allergen Name Date Reaction Notes   Azasite --  --  --    hydrocodone-acetaminophen --  SOB/Vomiting/Numbness of body --    tramadol --  SOB/Vomiting/Numbness of body --          Family Medical History  Disease Name Relative/Age Notes   Stroke  --    Heart Disease Father/   Father   Cancer, Unspecified Mother/  Sister/   Mother; Sister   Breast Neoplasm  --    Heart Attack (MI) Mother/   --          Social History  Finding Status Start/Stop Quantity Notes   Alcohol Never --/-- --  --    Homemaker. --  --/-- --  --    lives with spouse --  --/-- --  --     --  --/-- --  --    Recreational Drug Use Never --/-- --  no   Tobacco Never --/-- --  never smoker         Immunizations  NameDate Admin Mfg Trade Name Lot Number Route Inj VIS Given VIS Publication   Xutvczfzp15/01/2020 [facility] Fluzone Quadrivalent [id number] IM LD 10/01/2020 08/15/2019   Comments: pt tolerated inj well         Vitals  Date Time BP Position Site L\R Cuff Size HR RR TEMP (F) WT  HT  BMI kg/m2 BSA m2 O2 Sat FR L/min FiO2 HC       10/01/2020 11:44 /76 Sitting    65 - R   138lbs 6oz 5'  4\" 23.75 1.68 97 %  21%          Physical Examination  · Ears, Nose, Mouth and Throat  o Ears  o :   § External Ears  § : appearance within normal limits, no lesions present  § Otoscopic Examination  § : tympanic membrane appearance within normal limits bilaterally without perforations, mobility normal  o Nose  o :   § External Nose  § : normal stucture noted.  § Intranasal Exam  § : no swelling, reddness, turbs normal alie.  o Oral Cavity  o :   § Oral Mucosa  § : oral mucosa normal without pallor or cyanosis  § Lips  § : lip appearance normal  § Teeth  § : normal dentition for age  § Gums  § : gums pink, " non-swollen, no bleeding present  § Tongue  § : tongue appearance normal  § Palate  § : hard palate normal, soft palate appearance normal  o Throat  o :   § Oropharynx  § : no inflammation or lesions present, tonsils within normal limits  · Respiratory  o Respiratory Effort  o : breathing unlabored  o Auscultation of Lungs  o : normal breath sounds throughout  · Cardiovascular  o Heart  o :   § Auscultation of Heart  § : regular rate and rhythm, no murmurs, gallops or rubs  § Palpation of Heart  § : normal apical impulse, no cardiac thrill present  o Peripheral Vascular System  o :   § Carotid Arteries  § : normal pulses bilaterally, no bruits present  § Pedal Pulses  § : pulses 2 bilaterally  § Extremities  § : no cyanosis, clubbing or edema; less than 2 second refill noted  · Gastrointestinal  o Abdominal Examination  o : abdomen nontender to palpation, tone normal without rigidity or guarding, no masses present, abdominal contour scaphoid  o Liver and spleen  o : no hepatomegaly present, liver nontender to palpation, spleen not palpable  · Musculoskeletal  o Right Upper Extremity  o :   § Inspection/Palpation  § : no tenderness to palpation  § Range of Motion  § : range of motion normal, no joint crepitus or pain with motion present  o Left Upper Extremity  o :   § Inspection/Palpation  § : no tenderness to palpation  § Range of Motion  § : range of motion normal, no joint crepitus present, no pain with joint motion  o Right Lower Extremity  o :   § Inspection/Palpation  § : no joint or limb tenderness to palpation  § Range of Motion  § : range of motion normal, no joint crepitations present, no pain on motion  o Left Lower Extremity  o :   § Inspection/Palpation  § : no joint or limb tenderness to palpation  § Range of Motion  § : range of motion normal, no joint crepitations present, no pain on motion  · Skin and Subcutaneous Tissue  o General Inspection  o : no rashes or lesions present, no areas of  discoloration  · Neurologic  o Mental Status Examination  o :   § Orientation  § : grossly oriented to person, place and time  o Cranial Nerves  o : cranial nerves intact and symmetric throughout  · Psychiatric  o Mood and Affect  o : mood normal, affect appropriate, denies any SI/HI          Assessment  · Encounter for Medicare annual wellness exam     V70.0/Z00.00  · Screening for depression     V79.0/Z13.89  · Screening for alcoholism     V79.1/Z13.39  · Advanced care planning/counseling discussion     V65.49/Z71.89      Plan  · Orders  o Falls Risk Assessment Completed (3288F) - V70.0/Z00.00 - 10/01/2020  o Brief hearing screening (written) [facility] () - V70.0/Z00.00 - 10/01/2020  o Annual Wellness Visit-includes a Personalized Prevention Plan of Service (PPS), SUBSEQUENT VISIT (Medicare) () - V70.0/Z00.00 - 10/01/2020  o Presence or absence of urinary incontinence assessed (SHANE) (1090F) - V70.0/Z00.00 - 10/01/2020  o ACO-18: Negative screen for clinical depression using a standardized tool () - V79.0/Z13.89 - 10/01/2020  o Negative alcohol screening () - V79.1/Z13.39 - 10/01/2020  o ACO-39: Current medications updated and reviewed (, 1159F) - - 10/01/2020  o ACO - Pt declines to or was not able to provide an Advance Care Plan or name a Surrogate Decision Maker (1124F) - - 10/01/2020  · Medications  o Medications have been Reconciled  o Transition of Care or Provider Policy  · Instructions  o Health Risk Assessment has been reviewed with the patient.  o Written health screening schedule for next 5-10 years was established with patient; information scanned in chart and given/mailed to patient.  o Fall prevention methods discussed and a copy of recommendations given/mailed to patient.  o Depression Screen completed and scanned into the EMR under the designated folder within the patient's documents.  o Today's PHQ-9 result is _3__  o Audit-C Questionnaire completed and scanned into the EMR under  the designated folder within the patient's documents.            Electronically Signed by: MADELYN Mendiola -Author on October 1, 2020 12:21:34 PM

## 2021-05-13 NOTE — PROGRESS NOTES
Progress Note      Patient Name: Panda Rinaldi   Patient ID: [id number]   Sex: Female   YOB: 1960    Primary Care Provider: Sadie JOSHI   Referring Provider: Sadie JOSHI    Visit Date: May 4, 2020    Provider: MADELYN Mendiola   Location: [facility]   Location Address: [address] [place] [address], [address]  [place], [place]  [id number]   Location Phone: [phone]          Chief Complaint  · possible UTI      History Of Present Illness  Panda Rinaldi is a 59 year old  female who presents for evaluation and treatment of:      Pt is here for possible UTI, states that since Saturday she feels that she needs to urinate all the time but is unable to at times, when she does it saucedo.  notes that she had chills, was warm to touch, nauseated, and lethargic at times. He is unsure of fever, states that they have no way to check. She has been taking Tylenol and using a heating pad to try and get relief.  Pt  is a very poor historian, her  speaks for her.  She states she is drinking lots of water and cranberry juice and she did urinate this am before she came into the office.    Pt also states she has heart burn at times and would like something called in for this.           Past Medical History  Disease Name Date Onset Notes   Arthritis --  --    Bone Density Screening 10/15/14 Osteoporosis- Tried Fosamax in the past   Cervical cancer screening 4/25/19 2016   Heart Disease --  --    Hyperlipemia --  --    Palpitations --  --    Pap smear for cervical cancer screening 4/30/19 --    Pap Smear for Vaginal Cancer Screening 4/30/19 --    Reflux --  --    Screening Mammogram 4/30/18 [facility]   Seasonal allergies --  --    Shoulder pain --  --          Past Surgical History  Procedure Name Date Notes   Colonoscopy 10/2015 [facility]   Shoulder surgery 2014 [facility] Rt Shoulder         Medication List  Name Date Started Instructions   cyclobenzaprine 10 mg oral tablet 01/14/2020 take 1 tablet (10 mg)  by oral route 3 times per day as needed   Flonase Allergy Relief 50 mcg/actuation nasal spray,suspension 01/14/2020 spray 1 - 2 sprays (50 - 100 mcg) in each nostril by intranasal route once daily   ibuprofen 200 mg oral tablet 04/25/2019 take 1 tablet (200 mg) by oral route every 4-6 hours as needed with food   Lipitor 20 mg oral tablet 01/14/2020 take 1 tablet (20 mg) by oral route once daily for 90 days   Singulair 10 mg oral tablet 01/14/2020 take 1 tablet (10 mg) by oral route once daily in the evening for 90 days   Toprol XL 25 mg oral tablet extended release 24 hr 04/25/2019 take 1 tablet (25 mg) by oral route once daily for 90 days   Zyrtec 10 mg oral tablet 01/14/2020 take 1 tablet (10 mg) by oral route once daily for 90 days         Allergy List  Allergen Name Date Reaction Notes   Azasite --  --  --    hydrocodone-acetaminophen --  SOB/Vomiting/Numbness of body --    tramadol --  SOB/Vomiting/Numbness of body --          Family Medical History  Disease Name Relative/Age Notes   Stroke  --    Heart Disease Father/   Father   Cancer, Unspecified Mother/  Sister/   Mother; Sister   Breast Neoplasm  --    Heart Attack (MI) Mother/   --          Social History  Finding Status Start/Stop Quantity Notes   Alcohol Never --/-- --  --    Homemaker. --  --/-- --  --    lives with spouse --  --/-- --  --     --  --/-- --  --    Recreational Drug Use Never --/-- --  no   Tobacco Never --/-- --  never smoker         Immunizations  NameDate Admin Mfg Trade Name Lot Number Route Inj VIS Given VIS Publication   InfluenzaRefused 03/10/2020 NE Not Entered  NE NE     Comments:          Review of Systems  · Constitutional  o Admits  o : chills, fatigue  o Denies  o : fever, weight loss, weight gain  · Cardiovascular  o Denies  o : lower extremity edema, claudication, chest pressure, palpitations  · Respiratory  o Denies  o : shortness of breath, wheezing, cough, hemoptysis, dyspnea on  "exertion  · Gastrointestinal  o Admits  o : nausea, GERD  o Denies  o : vomiting, diarrhea, constipation, abdominal pain  · Genitourinary  o Admits  o : urgency, frequency, dysuria  o Denies  o : hematuria      Vitals  Date Time BP Position Site L\R Cuff Size HR RR TEMP (F) WT  HT  BMI kg/m2 BSA m2 O2 Sat HC       01/14/2020 10:46 /77 Sitting    70 - R   138lbs 0oz 5'  4\" 23.69 1.68 97 %    03/10/2020 09:31 /84 Sitting    80 - R  98.4 138lbs 0oz 5'  4\" 23.69 1.68 97 %    05/04/2020 08:52 /76 Sitting    76 - R  97.1 135lbs 0oz 5'  4\" 23.17 1.66 97 %          Physical Examination  · Constitutional  o Appearance  o : well-nourished, well developed, alert, in no acute distress  · Respiratory  o Respiratory Effort  o : breathing unlabored  o Auscultation of Lungs  o : normal breath sounds throughout  · Cardiovascular  o Heart  o :   § Auscultation of Heart  § : regular rate and rhythm, no murmurs, gallops or rubs  § Palpation of Heart  § : normal apical impulse, no cardiac thrill present  o Peripheral Vascular System  o :   § Carotid Arteries  § : normal pulses bilaterally, no bruits present  § Pedal Pulses  § : pulses 2 bilaterally  § Extremities  § : no cyanosis, clubbing or edema; less than 2 second refill noted  · Gastrointestinal  o Abdominal Examination  o : abdomen nontender to palpation, tone normal without rigidity or guarding, no masses present, abdominal contour scaphoid  o Liver and spleen  o : no hepatomegaly present, liver nontender to palpation, spleen not palpable  · Genitourinary  o FEMALE  EXAM:  o :   §  and rectal exam deferred  § : negative CVA tenderness  · Skin and Subcutaneous Tissue  o General Inspection  o : no rashes or lesions present, no areas of discoloration  · Neurologic  o Mental Status Examination  o :   § Orientation  § : grossly oriented to person, place and time  o Cranial Nerves  o : cranial nerves intact and symmetric throughout  · Psychiatric  o Mood and " Affect  o : mood normal, affect appropriate          Results  · In-Office Procedures  o Lab procedure  § IOP - Urinalysis without Microscopy (Clinitek) [facility] ([id number])   § Color Ur: Dark yellow   § Clarity Ur: Cloudy   § Glucose Ur Ql Strip: Negative   § Bilirub Ur Ql Strip: Negative   § Ketones Ur Ql Strip: Negative   § Sp Gr Ur Qn: 1.020   § Hgb Ur Ql Strip: Trace-Lysed   § pH Ur-LsCnc: 6.5   § Prot Ur Ql Strip: 30 mg/dL   § Urobilinogen Ur Strip-mCnc: 1.0 E.U./dL   § Nitrite Ur Ql Strip: Negative   § WBC Est Ur Ql Strip: Moderate       Assessment  · Fatigue     780.79/R53.83  · GERD (gastroesophageal reflux disease)     530.81/K21.9  · Hyperlipidemia     272.4/E78.5  · Urinary tract infection     599.0/N39.0  · Dysuria     788.1/R30.0  · Chills     780.64/R68.83  · Nausea     787.02/R11.0  · Urinary frequency     788.41/R35.0    Problems Reconciled  Plan  · Orders  o ACO-39: Current medications updated and reviewed () - - 05/04/2020  o Urine Culture (Clean Catch) [facility] ([id number]) - - 05/04/2020  · Medications  o Macrobid 100 mg oral capsule   SIG: take 1 capsule (100 mg) by oral route every 12 hours with food for 7 days   DISP: (14) capsules with 0 refills  Prescribed on 05/04/2020     o Nexium 40 mg oral capsule,delayed release(DR/EC)   SIG: take 1 capsule (40 mg) by oral route once daily for 30 days   DISP: (30) capsules with 0 refills  Prescribed on 05/04/2020     o Medications have been Reconciled  o Transition of Care or Provider Policy  · Instructions  o Advised that cheeses and other sources of dairy fats, animal fats, fast food, and the extras (candy, pastries, pies, doughnuts and cookies) all contain LDL raising nutrients. Advised to increase fruits, vegetables, whole grains, and to monitor portion sizes.   o Increase fluid intake. If you develop fever, chills, N/V, flank pain, or worsening of your symptoms return to the office or go to the ER.  o Patient was educated/instructed on their diagnosis, treatment  and medications prior to discharge from the clinic today.  o Patient instructed to seek medical attention urgently for new or worsening symptoms.  · Disposition  o Call or Return if symptoms worsen or persist.            Electronically Signed by: MADELYN Mendiola -Author on May 4, 2020 09:14:05 AM

## 2021-05-14 VITALS
BODY MASS INDEX: 24.46 KG/M2 | DIASTOLIC BLOOD PRESSURE: 79 MMHG | WEIGHT: 143.25 LBS | HEIGHT: 64 IN | HEART RATE: 76 BPM | OXYGEN SATURATION: 99 % | SYSTOLIC BLOOD PRESSURE: 121 MMHG

## 2021-05-14 VITALS
DIASTOLIC BLOOD PRESSURE: 72 MMHG | BODY MASS INDEX: 24.24 KG/M2 | WEIGHT: 142 LBS | HEIGHT: 64 IN | OXYGEN SATURATION: 98 % | HEART RATE: 68 BPM | SYSTOLIC BLOOD PRESSURE: 129 MMHG

## 2021-05-14 VITALS
BODY MASS INDEX: 23.62 KG/M2 | SYSTOLIC BLOOD PRESSURE: 116 MMHG | DIASTOLIC BLOOD PRESSURE: 76 MMHG | HEIGHT: 64 IN | WEIGHT: 138.37 LBS | HEART RATE: 65 BPM | OXYGEN SATURATION: 97 %

## 2021-05-14 NOTE — PROGRESS NOTES
Progress Note      Patient Name: Panda Rinaldi   Patient ID: [id number]   Sex: Female   YOB: 1960    Primary Care Provider: Sadie JOSHI   Referring Provider: Sadie JOSHI    Visit Date: April 1, 2021    Provider: MADELYN Mendiola   Location: [facility]   Location Address: [address] [place] [address], [address]  [place], [place]  [id number]   Location Phone: [phone]          Chief Complaint  · 6 month follow up      History Of Present Illness  Panda Rinaldi is a 60 year old  female who presents for evaluation and treatment of:      Pt is here for a 6 month follow up. She has had an ongoing left hip pain.  She reports she did PT in the past and it worked well and she would like to try it again. She describes the pain as a burning, squeezing sensation that is worse when changing positions.     Pt needs to get her Nexium changed to a different RX, her pharmacy does not carry she will call us with what she wishes to switch to.    DEXA:7/17/2019  will schedule  Mammogram: 1/25/21  Labs: 7/4/2020  pap smear: 4/25/2019 will schedule with us  Flu: already received  Colonoscopy: 2015 in chart  MAW: 10/01/2020    Pt does not need refills at this time.       Past Medical History  Disease Name Date Onset Notes   Arthritis --  --    Bone Density Screening 10/15/14 Osteoporosis- Tried Fosamax in the past   Cervical cancer screening 4/25/19 2016   Heart Disease --  --    Hyperlipemia --  --    Palpitations --  --    Pap smear for cervical cancer screening 4/30/19 --    Pap Smear for Vaginal Cancer Screening 4/30/19 --    Reflux --  --    Screening Mammogram 1/25/21 --    Seasonal allergies --  --    Shoulder pain --  --          Past Surgical History  Procedure Name Date Notes   Colonoscopy 10/2015 [facility]   Shoulder surgery 2014 [facility] Rt Shoulder         Medication List  Name Date Started Instructions   cyclobenzaprine 10 mg oral tablet 01/14/2020 take 1 tablet (10 mg) by oral route  3 times per day as needed   Flonase Allergy Relief 50 mcg/actuation nasal spray,suspension 01/14/2020 spray 1 - 2 sprays (50 - 100 mcg) in each nostril by intranasal route once daily   ibuprofen 200 mg oral tablet 04/25/2019 take 1 tablet (200 mg) by oral route every 4-6 hours as needed with food   Lipitor 20 mg oral tablet 10/01/2020 take 1 tablet (20 mg) by oral route once daily for 90 days   Medrol (Mau) 4 mg oral tablets,dose pack 01/05/2021 take by oral route as directed per package instructions   Mobic 15 mg oral tablet 10/01/2020 take 1 tablet (15 mg) by oral route once daily for 90 days   Nexium 40 mg oral capsule,delayed release(DR/EC) 10/01/2020 take 1 capsule (40 mg) by oral route once daily for 90 days   Singulair 10 mg oral tablet 10/01/2020 take 1 tablet (10 mg) by oral route once daily in the evening for 90 days   Toprol XL 25 mg oral tablet extended release 24 hr 10/01/2020 take 1 tablet (25 mg) by oral route once daily for 90 days   Zyrtec 10 mg oral tablet 03/10/2021 take 1 tablet (10 mg) by oral route once daily for 90 days         Allergy List  Allergen Name Date Reaction Notes   Azasite --  --  --    hydrocodone-acetaminophen --  SOB/Vomiting/Numbness of body --    tramadol --  SOB/Vomiting/Numbness of body --        Allergies Reconciled  Family Medical History  Disease Name Relative/Age Notes   Stroke  --    Heart Disease Father/   Father   Cancer, Unspecified Mother/  Sister/   Mother; Sister   Breast Neoplasm  --    Heart Attack (MI) Mother/   --          Social History  Finding Status Start/Stop Quantity Notes   Alcohol Never --/-- --  --    Homemaker. --  --/-- --  --    lives with spouse --  --/-- --  --     --  --/-- --  --    Recreational Drug Use Never --/-- --  no   Tobacco Never --/-- --  never smoker         Immunizations  NameDate Admin Mfg Trade Name Lot Number Route Inj VIS Given VIS Publication   [id number]/01/2020 PMC Fluzone Quadrivalent [id number] IM LD 10/01/2020  "08/15/2019   Comments: pt tolerated inj well         Review of Systems  · Constitutional  o Denies  o : fever, fatigue, weight loss, weight gain  · Cardiovascular  o Denies  o : lower extremity edema, claudication, chest pressure, palpitations  · Respiratory  o Denies  o : shortness of breath, wheezing, cough, hemoptysis, dyspnea on exertion  · Gastrointestinal  o Denies  o : nausea, vomiting, diarrhea, constipation, abdominal pain  · Musculoskeletal  o Admits  o : hip pain on left      Vitals  Date Time BP Position Site L\R Cuff Size HR RR TEMP (F) WT  HT  BMI kg/m2 BSA m2 O2 Sat FR L/min FiO2 HC       04/01/2021 10:30 /72 Sitting    68 - R   142lbs 0oz 5'  4\" 24.37 1.71 98 %            Physical Examination  · Constitutional  o Appearance  o : well-nourished, well developed, alert, in no acute distress  · Ears, Nose, Mouth and Throat  o Ears  o :   § External Ears  § : appearance within normal limits, no lesions present  § Otoscopic Examination  § : tympanic membrane appearance within normal limits bilaterally without perforations, mobility normal  o Nose  o :   § External Nose  § : normal stucture noted.  § Intranasal Exam  § : no swelling, reddness, turbs normal alie.  o Oral Cavity  o :   § Oral Mucosa  § : oral mucosa normal without pallor or cyanosis  § Lips  § : lip appearance normal  § Teeth  § : normal dentition for age  § Gums  § : gums pink, non-swollen, no bleeding present  § Tongue  § : tongue appearance normal  § Palate  § : hard palate normal, soft palate appearance normal  o Throat  o :   § Oropharynx  § : no inflammation or lesions present, tonsils within normal limits  · Respiratory  o Respiratory Effort  o : breathing unlabored  o Auscultation of Lungs  o : normal breath sounds throughout  · Cardiovascular  o Heart  o :   § Auscultation of Heart  § : regular rate and rhythm, no murmurs, gallops or rubs  § Palpation of Heart  § : normal apical impulse, no cardiac thrill present  o Peripheral " Vascular System  o :   § Extremities  § : no cyanosis, clubbing or edema; less than 2 second refill noted  · Musculoskeletal  o Right Upper Extremity  o :   § Inspection/Palpation  § : no tenderness to palpation  § Range of Motion  § : range of motion normal, no joint crepitus or pain with motion present  o Left Upper Extremity  o :   § Inspection/Palpation  § : no tenderness to palpation  § Range of Motion  § : range of motion normal, no joint crepitus present, no pain with joint motion  o Right Lower Extremity  o :   § Inspection/Palpation  § : no joint or limb tenderness to palpation  § Range of Motion  § : range of motion normal, no joint crepitations present, no pain on motion  o Left Lower Extremity  o :   § Inspection/Palpation  § : no joint or limb tenderness to palpation  § Range of Motion  § : range of motion normal, no joint crepitations present, pain in left hip with ROM  · Skin and Subcutaneous Tissue  o General Inspection  o : no rashes or lesions present, no areas of discoloration  · Neurologic  o Mental Status Examination  o :   § Orientation  § : grossly oriented to person, place and time  o Cranial Nerves  o : cranial nerves intact and symmetric throughout  · Psychiatric  o Mood and Affect  o : mood normal, affect appropriate, denies any SI/HI          Assessment  · Allergic rhinitis due to allergen     477.9/J30.9  · Essential hypertension     401.9/I10  · GERD (gastroesophageal reflux disease)     530.81/K21.9  · Hyperlipidemia     272.4/E78.5  · Post menopausal syndrome     V49.81/Z78.0  · Palpitations     785.1/R00.2  · Hyperlipemia     272.4/E78.5  · Screening for thyroid disorder     V77.0/Z13.29  · Left hip pain     719.45/M25.552    Problems Reconciled  Plan  · Orders  o DEXA Bone Density, 1 or more sites, axial skeleton [facility] (41201) - V49.81/Z78.0 - 05/17/2021  o CBC with Auto Diff [facility] (39531) - 401.9/I10 - 04/01/2021  o CMP [facility] (30123) - 401.9/I10 - 04/01/2021  o Lipid Panel [facility] (28089) -  272.4/E78.5 - 04/01/2021  o Thyroid Profile (31586, 84161, THYII) - V77.0/Z13.29 - 04/01/2021  o ACO-14: Influenza immunization administered or previously received [facility] () - - 04/01/2021  o ACO-20: Screening Mammography documented and reviewed [facility] () - - 04/01/2021  o ACO-19: Colorectal cancer screening results documented and reviewed (3017F) - - 04/01/2021  o ACO-39: Current medications updated and reviewed (, 1159F) - - 04/01/2021  o PHYSICAL THERAPY CONSULTATION ([facility]) - - 04/01/2021  · Medications  o Medications have been Reconciled  o Transition of Care or Provider Policy  · Instructions  o Patient advised to monitor blood pressure (B/P) at home and journal readings. Patient informed that a B/P reading at home of more than 130/80 is considered hypertension. For readings greater [id number] or higher patient is advised to follow up in the office with readings for management. Patient advised to limit sodium intake.  o Maintain a healthy weight. Avoid tight fitting clothes. Avoid fried, fatty foods, tomato sauce, chocolate, mint, garlic, onion, alcohol. caffeine. Eat smaller meals, dont lie down after a meal, dont smoke. Elevate the head of your bed 6-9 inches.  o Advised that cheeses and other sources of dairy fats, animal fats, fast food, and the extras (candy, pastries, pies, doughnuts and cookies) all contain LDL raising nutrients. Advised to increase fruits, vegetables, whole grains, and to monitor portion sizes.   o Stop taking calcium supplements for at least 48 hours prior to your exam. Failure to stop supplements could alter results, and the radiologists will require you to reschedule your test.  o Patient was educated/instructed on their diagnosis, treatment and medications prior to discharge from the clinic today.  o Patient instructed to seek medical attention urgently for new or worsening symptoms.  o Call the office with any concerns or questions.  · Disposition  o Return Visit Request  in/on 6 months +/- 2 weeks ([id number]).            Electronically Signed by: MADELYN Mendiola -Author on April 1, 2021 10:56:23 AM

## 2021-05-14 NOTE — PROGRESS NOTES
Progress Note      Patient Name: Panda Rinaldi   Patient ID: [id number]   Sex: Female   YOB: 1960    Primary Care Provider: Sadie JOSHI   Referring Provider: Sadie JOSHI    Visit Date: January 5, 2021    Provider: MADELYN Mendiola   Location: [facility]   Location Address: [address] [place] [address], [address]  [place]  [address]   Location Phone: [phone]          Chief Complaint  · back pain      History Of Present Illness  Panda Rinaldi is a 60 year old  female who presents for evaluation and treatment of:      Pt is here for back pain for the last few days. It is in her mid back that radiates to lower back with position changes. She has NKI. She has been using heat, ice, and taking motrin with not much relief.  She has no pain when laying down or walking only when going from sitting to standing or bending.    Pt has already had her flu vaccine and mammogram is already scheduled.       Past Medical History  Disease Name Date Onset Notes   Arthritis --  --    Bone Density Screening 10/15/14 Osteoporosis- Tried Fosamax in the past   Cervical cancer screening 4/25/19 2016   Heart Disease --  --    Hyperlipemia --  --    Palpitations --  --    Pap smear for cervical cancer screening 4/30/19 --    Pap Smear for Vaginal Cancer Screening 4/30/19 --    Reflux --  --    Screening Mammogram 4/30/18 [facility]   Seasonal allergies --  --    Shoulder pain --  --          Past Surgical History  Procedure Name Date Notes   Colonoscopy 10/2015 [facility]   Shoulder surgery 2014 [facility] Rt Shoulder         Medication List  Name Date Started Instructions   cyclobenzaprine 10 mg oral tablet 01/14/2020 take 1 tablet (10 mg) by oral route 3 times per day as needed   Flonase Allergy Relief 50 mcg/actuation nasal spray,suspension 01/14/2020 spray 1 - 2 sprays (50 - 100 mcg) in each nostril by intranasal route once daily   ibuprofen 200 mg oral tablet 04/25/2019 take 1 tablet (200 mg) by  oral route every 4-6 hours as needed with food   Lipitor 20 mg oral tablet 10/01/2020 take 1 tablet (20 mg) by oral route once daily for 90 days   Mobic 15 mg oral tablet 10/01/2020 take 1 tablet (15 mg) by oral route once daily for 90 days   Nexium 40 mg oral capsule,delayed release(DR/EC) 10/01/2020 take 1 capsule (40 mg) by oral route once daily for 90 days   Singulair 10 mg oral tablet 10/01/2020 take 1 tablet (10 mg) by oral route once daily in the evening for 90 days   Toprol XL 25 mg oral tablet extended release 24 hr 10/01/2020 take 1 tablet (25 mg) by oral route once daily for 90 days   Zyrtec 10 mg oral tablet 10/01/2020 take 1 tablet (10 mg) by oral route once daily for 90 days         Allergy List  Allergen Name Date Reaction Notes   Azasite --  --  --    hydrocodone-acetaminophen --  SOB/Vomiting/Numbness of body --    tramadol --  SOB/Vomiting/Numbness of body --        Allergies Reconciled  Family Medical History  Disease Name Relative/Age Notes   Stroke  --    Heart Disease Father/   Father   Cancer, Unspecified Mother/  Sister/   Mother; Sister   Breast Neoplasm  --    Heart Attack (MI) Mother/   --          Social History  Finding Status Start/Stop Quantity Notes   Alcohol Never --/-- --  --    Homemaker. --  --/-- --  --    lives with spouse --  --/-- --  --     --  --/-- --  --    Recreational Drug Use Never --/-- --  no   Tobacco Never --/-- --  never smoker         Immunizations  NameDate Admin Mfg Trade Name Lot Number Route Inj VIS Given VIS Publication   Autzosrmx32/01/2020 [facility] Fluzone Quadrivalent [id number] IM LD 10/01/2020 08/15/2019   Comments: pt tolerated inj well         Review of Systems  · Constitutional  o Denies  o : fever, fatigue, weight loss, weight gain  · Cardiovascular  o Denies  o : lower extremity edema, claudication, chest pressure, palpitations  · Respiratory  o Denies  o : shortness of breath, wheezing, cough, hemoptysis, dyspnea on  "exertion  · Gastrointestinal  o Denies  o : nausea, vomiting, diarrhea, constipation, abdominal pain  · Musculoskeletal  o Admits  o : back pain      Vitals  Date Time BP Position Site L\R Cuff Size HR RR TEMP (F) WT  HT  BMI kg/m2 BSA m2 O2 Sat FR L/min FiO2 HC       03/10/2020 09:31 /84 Sitting    80 - R  98.4 138lbs 0oz 5'  4\" 23.69 1.68 97 %      05/04/2020 08:52 /76 Sitting    76 - R  97.1 135lbs 0oz 5'  4\" 23.17 1.66 97 %      10/01/2020 11:44 /76 Sitting    65 - R   138lbs 6oz 5'  4\" 23.75 1.68 97 %  21%    01/05/2021 11:26 /79 Sitting    76 - R   143lbs 4oz 5'  4\" 24.59 1.71 99 %            Physical Examination  · Constitutional  o Appearance  o : well-nourished, well developed, alert, in no acute distress  · Ears, Nose, Mouth and Throat  o Ears  o :   § External Ears  § : appearance within normal limits, no lesions present  § Otoscopic Examination  § : tympanic membrane appearance within normal limits bilaterally without perforations, mobility normal  o Nose  o :   § External Nose  § : normal stucture noted.  § Intranasal Exam  § : no swelling, reddness, turbs normal alie.  o Oral Cavity  o :   § Oral Mucosa  § : oral mucosa normal without pallor or cyanosis  § Lips  § : lip appearance normal  § Teeth  § : normal dentition for age  § Gums  § : gums pink, non-swollen, no bleeding present  § Tongue  § : tongue appearance normal  § Palate  § : hard palate normal, soft palate appearance normal  o Throat  o :   § Oropharynx  § : no inflammation or lesions present, tonsils within normal limits  · Respiratory  o Respiratory Effort  o : breathing unlabored  o Auscultation of Lungs  o : normal breath sounds throughout  · Cardiovascular  o Heart  o :   § Auscultation of Heart  § : regular rate and rhythm, no murmurs, gallops or rubs  § Palpation of Heart  § : normal apical impulse, no cardiac thrill present  o Peripheral Vascular System  o :   § Extremities  § : no cyanosis, clubbing or edema; " less than 2 second refill noted  · Musculoskeletal  o Right Upper Extremity  o :   § Inspection/Palpation  § : no tenderness to palpation  § Range of Motion  § : range of motion normal, no joint crepitus or pain with motion present  o Left Upper Extremity  o :   § Inspection/Palpation  § : no tenderness to palpation  § Range of Motion  § : range of motion normal, no joint crepitus present, no pain with joint motion  o Right Lower Extremity  o :   § Inspection/Palpation  § : no joint or limb tenderness to palpation  § Range of Motion  § : range of motion normal, no joint crepitations present, no pain on motion  o Left Lower Extremity  o :   § Inspection/Palpation  § : no joint or limb tenderness to palpation  § Range of Motion  § : range of motion normal, no joint crepitations present, no pain on motion  · Skin and Subcutaneous Tissue  o General Inspection  o : no rashes or lesions present, no areas of discoloration  · Neurologic  o Mental Status Examination  o :   § Orientation  § : grossly oriented to person, place and time  o Cranial Nerves  o : cranial nerves intact and symmetric throughout  · Psychiatric  o Mood and Affect  o : mood normal, affect appropriate, denies any SI/HI  · Back  o Inspection  o : no redness, no deformities  o Palpation  o : tenderness present in left lower back upon palpation and with flexion  o Range of Motion  o : flexion normal- 60 to 90 degrees  o Gait  o : normal gait          Assessment  · Hyperlipemia     272.4/E78.5  · Back pain     724.5/M54.9    Problems Reconciled  Plan  · Orders  o ACO-14: Influenza immunization administered or previously received [facility] () - - 01/05/2021  o ACO-39: Current medications updated and reviewed (1159F, ) - - 01/05/2021  o 2 - IM/SQ - Injection Fee [facility] ([id number]) - - 01/05/2021  o 6.00 - Kenalog Injection 60mg (-4) - - 01/05/2021  o 4.00 - Toradol Injection 60mg (-1) - - 01/05/2021   Injection - Toradol 60 mg; Dose: 60 mg; Site: Left  Gluteus; Route: intramuscular; Date: 01/05/2021 11:48:09; Exp: 01/20/2022; Lot: [id number]; Mfg: N/A; TradeName: ketorolac; Location: [facility]; Administered By: Lucia Pro MA; Comment: pt tolerated injection well  · Medications  o Medrol (Mau) 4 mg oral tablets,dose pack   SIG: take by oral route as directed per package instructions   DISP: (1) Packet with 0 refills  Prescribed on 01/05/2021     o Medications have been Reconciled  o Transition of Care or Provider Policy  · Instructions  o Patient was educated/instructed on their diagnosis, treatment and medications prior to discharge from the clinic today.            Electronically Signed by: MADELYN Mendiola -Author on January 5, 2021 11:51:37 AM

## 2021-05-14 NOTE — PROGRESS NOTES
Progress Note      Patient Name: Panda Rinaldi   Patient ID: [id number]   Sex: Female   YOB: 1960    Primary Care Provider: Sadie JOSHI   Referring Provider: Sadie JOSHI    Visit Date: May 4, 2021    Provider: MADELYN Mendiola   Location: [facility]   Location Address: [address] [place] [address], [address]  [place]  [id number]   Location Phone: [phone]          Chief Complaint  · Annual Exam  · PAP exam  · (Health Maintainence Information Reviewed Under Results)      History Of Present Illness  Panda Rinaldi is a 60 year old  female who presents for evaluation and treatment of:   Last PAP Smear: 04/25/2019.   Date of Last Mammogram: 01/25/2021.   Date of Last Colonoscopy: 10/01/2010   No current complaints.      Patient is due for Colonoscopy and EGD, has been 10 years since her last at [place]. Will schedule toay.    Patient went and seen [facility] for her foot pain in her toes. They have resolved the issue, but now she is having pain on the bottom of her feet that is more painful when walking. She would like an appt to go back and see them.           Past Medical History  Disease Name Date Onset Notes   Arthritis --  --    Bone Density Screening 10/15/14 Osteoporosis- Tried Fosamax in the past   Cervical cancer screening 4/25/19 2016   Heart Disease --  --    Hyperlipemia --  --    Palpitations --  --    Pap smear for cervical cancer screening 4/30/19 --    Pap Smear for Vaginal Cancer Screening 4/30/19 --    Reflux --  --    Screening Mammogram 1/25/21 --    Seasonal allergies --  --    Shoulder pain --  --          Past Surgical History  Procedure Name Date Notes   [facility]   Shoulder surgery 2014 [facility] Rt Shoulder         Medication List  Name Date Started Instructions   cyclobenzaprine 10 mg oral tablet 01/14/2020 take 1 tablet (10 mg) by oral route 3 times per day as needed   ibuprofen 200 mg oral tablet 04/25/2019 take 1  tablet (200 mg) by oral route every 4-6 hours as needed with food   Lipitor 20 mg oral tablet 10/01/2020 take 1 tablet (20 mg) by oral route once daily for 90 days   Mobic 15 mg oral tablet 10/01/2020 take 1 tablet (15 mg) by oral route once daily for 90 days   Toprol XL 25 mg oral tablet extended release 24 hr 10/01/2020 take 1 tablet (25 mg) by oral route once daily for 90 days   Zyrtec 10 mg oral tablet 03/10/2021 take 1 tablet (10 mg) by oral route once daily for 90 days         Allergy List  Allergen Name Date Reaction Notes   Azasite --  --  --    hydrocodone-acetaminophen --  SOB/Vomiting/Numbness of body --    tramadol --  SOB/Vomiting/Numbness of body --        Allergies Reconciled  Family Medical History  Disease Name Relative/Age Notes   Stroke  --    Heart Disease Father/   Father   Cancer, Unspecified Mother/  Sister/   Mother; Sister   Breast Neoplasm  --    Heart Attack (MI) Mother/   --          Social History  Finding Status Start/Stop Quantity Notes   Alcohol Never --/-- --  --    Homemaker. --  --/-- --  --    lives with spouse --  --/-- --  --     --  --/-- --  --    Recreational Drug Use Never --/-- --  no   Tobacco Never --/-- --  never smoker         Immunizations  NameDate Admin Mfg Trade Name Lot Number Route Inj VIS Given VIS Publication   Sxhdaazpc71/01/2020 [facility] Fluzone Quadrivalent [id number] IM LD 10/01/2020 08/15/2019   Comments: pt tolerated inj well         Review of Systems  · Constitutional  o Denies  o : fever, fatigue, weight loss, weight gain  · Breasts  o Denies  o : lumps, tenderness, swelling, nipple discharge  · Cardiovascular  o Admits  o : palpitations  o Denies  o : lower extremity edema, claudication, chest pressure  · Respiratory  o Denies  o : shortness of breath, wheezing, cough, hemoptysis, dyspnea on exertion  · Gastrointestinal  o Denies  o : nausea, vomiting, diarrhea, constipation, abdominal pain  · Genitourinary  o Denies  o : urgency, frequency, dysuria,  "hematuria, incontinence, vaginal discharge  · Musculoskeletal  o Admits  o : foot pain      Vitals  Date Time BP Position Site L\R Cuff Size HR RR TEMP (F) WT  HT  BMI kg/m2 BSA m2 O2 Sat FR L/min FiO2 HC       10/01/2020 11:44 /76 Sitting    65 - R   138lbs 6oz 5'  4\" 23.75 1.68 97 %  21%    01/05/2021 11:26 /79 Sitting    76 - R   143lbs 4oz 5'  4\" 24.59 1.71 99 %      04/01/2021 10:30 /72 Sitting    68 - R   142lbs 0oz 5'  4\" 24.37 1.71 98 %      05/04/2021 08:34 /73 Sitting    68 - R   138lbs 8oz 5'  4\" 23.77 1.68 95 %            Physical Examination  · Constitutional  o Appearance  o : well-nourished, in no acute distress  · Neck  o Inspection/Palpation  o : normal appearance, no masses or tenderness, trachea midline  o Thyroid  o : gland size normal, nontender, no nodules or masses present on palpation  · Respiratory  o Respiratory Effort  o : breathing unlabored  o Inspection of Chest  o : normal appearance  o Auscultation of Lungs  o : normal breath sounds throughout  · Cardiovascular  o Heart  o :   § Auscultation of Heart  § : regular rate and rhythm, no murmurs, gallops or rubs  · Breasts  o Inspection of Breasts  o : breasts symmetrical, no skin changes, no deformities present, no discharge present  o Palpation of Breasts, Axillae  o : no masses present on palpation, no breast tenderness  · Gastrointestinal  o Abdominal Examination  o : abdomen nontender to palpation, tone normal without rigidity or guarding, no masses present, normal bowel sounds  · Genitourinary  o External Genitalia  o : no inflammation, no lesions present  o Vagina  o : normal vaginal vault, no discharge present, no inflammatory lesions present, no masses present  o Bladder  o : nontender to palpation  o Cervix  o : appearance healthy, no lesions present, nontender to palpation, no discharges, no bleeding present, normal midline position  o Uterus  o : nontender to palpation, no masses present, position " midline/midplane  o Adnexa  o : no tenderness or masses present on bimanual examination  o Anus  o : no inflammation or lesions present  o Perineum  o : perineum within normal limits  · Lymphatic  o Neck  o : no lymphadenopathy present  o Axilla  o : no lymphadenopathy present  o Groin  o : no lymphadenopathy present  · Neurologic  o Mental Status Examination  o :   § Orientation  § : grossly oriented to person, place and time  o Gait and Station  o : normal gait, able to stand without difficulty  · Psychiatric  o Judgement and Insight  o : judgment and insight intact  o Mood and Affect  o : mood normal, affect appropriate          Results  · In-Office Procedures  o Lab procedure  § IOP - Urinalysis without Microscopy (Clinitek) [facility] ([id number])   § Color Ur: Dark yellow   § Clarity Ur: Clear   § Glucose Ur Ql Strip: Negative   § Bilirub Ur Ql Strip: Negative   § Ketones Ur Ql Strip: Negative   § Sp Gr Ur Qn: 1.020   § Hgb Ur Ql Strip: Negative   § pH Ur-LsCnc: 6.5   § Prot Ur Ql Strip: Negative   § Urobilinogen Ur Strip-mCnc: 0.2 E.U./dL   § Nitrite Ur Ql Strip: Negative   § WBC Est Ur Ql Strip: Negative       Assessment  · Screening for colon cancer     V76.51/Z12.11  · Routine gynecological examination     V72.31/Z01.419  · Pap smear, as part of routine gynecological examination     V76.2/Z01.419  · Palpitations     785.1/R00.2  · Hyperlipemia     272.4/E78.5  · Foot pain     729.5/M79.673  · Reflux gastritis     535.40/K29.60    Problems Reconciled  Plan  · Orders  o COLONOSCOPY REFERRAL (COLON) - V76.51/Z12.11 - 05/28/2021   Colonoscopy and EGD scheduled with Yana Rivera on 5/28/2021 at 8:45am.  referral will be scanned in chart.  o Pap Smear (Medicare) () - V76.2/Z01.419 - 05/04/2021  o ACO-39: Current medications updated and reviewed (, 1159F) - - 05/04/2021  o PODIATRY CONSULTATION (PODIA) - - 05/04/2021  · Medications  o Medications have been Reconciled  o Transition of Care or Provider  Policy  · Instructions  o **Pap Test/Liquid Based:   o Thin Prep  o Source:  o Cervix  o ********  o **Perform Reflex Human Papilloma Virus (HPV) High Risk on this Pap (If atypical squamous cells of the undetermined signifigcance (ASCUS)/Atypical Glandular Cells of undetermined significance (AGCUS): Low Grade Squamous Intraepitheal lesion (LGSIL): **  o Yes, if abnormal  o ********  o Medicare:  o Yes  o **Is this an annual PAP:  o Yes  o Patient was educated/instructed on their diagnosis, treatment and medications prior to discharge from the clinic today.  o Patient instructed to seek medical attention urgently for new or worsening symptoms.  o Call the office with any concerns or questions.  o Counseled on monthly breast self exams.   o Counseled on diet and exercise.   o Counseled on weight-bearing exercise.  o Recommended Calcium with Vitamin D twice daily.  o Used cytobrush to obtain Pap smear specimen. Sent to pathology for testing and review.  · Disposition  o Call or Return if symptoms worsen or persist.  o Return Visit Request in/on 6 months +/- 2 weeks ([id number]).            Electronically Signed by: MADELYN Mendiola -Author on May 4, 2021 09:05:56 AM

## 2021-05-15 VITALS
BODY MASS INDEX: 22.88 KG/M2 | HEIGHT: 64 IN | WEIGHT: 134 LBS | DIASTOLIC BLOOD PRESSURE: 81 MMHG | SYSTOLIC BLOOD PRESSURE: 114 MMHG | HEART RATE: 65 BPM

## 2021-05-15 VITALS
BODY MASS INDEX: 22.73 KG/M2 | HEART RATE: 64 BPM | DIASTOLIC BLOOD PRESSURE: 78 MMHG | HEIGHT: 64 IN | SYSTOLIC BLOOD PRESSURE: 116 MMHG | OXYGEN SATURATION: 97 % | WEIGHT: 133.12 LBS

## 2021-05-15 VITALS
HEART RATE: 62 BPM | BODY MASS INDEX: 23.43 KG/M2 | WEIGHT: 137.25 LBS | SYSTOLIC BLOOD PRESSURE: 122 MMHG | DIASTOLIC BLOOD PRESSURE: 86 MMHG | HEIGHT: 64 IN | OXYGEN SATURATION: 99 %

## 2021-05-15 VITALS
HEART RATE: 76 BPM | OXYGEN SATURATION: 97 % | TEMPERATURE: 97.1 F | SYSTOLIC BLOOD PRESSURE: 104 MMHG | WEIGHT: 135 LBS | DIASTOLIC BLOOD PRESSURE: 76 MMHG | HEIGHT: 64 IN | BODY MASS INDEX: 23.05 KG/M2

## 2021-05-15 VITALS
TEMPERATURE: 98.2 F | SYSTOLIC BLOOD PRESSURE: 112 MMHG | DIASTOLIC BLOOD PRESSURE: 72 MMHG | OXYGEN SATURATION: 97 % | HEART RATE: 69 BPM | HEIGHT: 64 IN | WEIGHT: 134.12 LBS | BODY MASS INDEX: 22.9 KG/M2

## 2021-05-15 VITALS
WEIGHT: 138 LBS | HEART RATE: 70 BPM | SYSTOLIC BLOOD PRESSURE: 117 MMHG | DIASTOLIC BLOOD PRESSURE: 77 MMHG | BODY MASS INDEX: 23.56 KG/M2 | OXYGEN SATURATION: 97 % | HEIGHT: 64 IN

## 2021-05-15 VITALS
DIASTOLIC BLOOD PRESSURE: 84 MMHG | WEIGHT: 138 LBS | SYSTOLIC BLOOD PRESSURE: 122 MMHG | TEMPERATURE: 98.4 F | OXYGEN SATURATION: 97 % | BODY MASS INDEX: 23.56 KG/M2 | HEART RATE: 80 BPM | HEIGHT: 64 IN

## 2021-05-16 VITALS
SYSTOLIC BLOOD PRESSURE: 127 MMHG | WEIGHT: 136 LBS | DIASTOLIC BLOOD PRESSURE: 85 MMHG | BODY MASS INDEX: 23.22 KG/M2 | HEIGHT: 64 IN | HEART RATE: 69 BPM

## 2021-05-16 VITALS — BODY MASS INDEX: 23.28 KG/M2 | WEIGHT: 136.37 LBS | OXYGEN SATURATION: 98 % | HEART RATE: 71 BPM | HEIGHT: 64 IN

## 2021-05-22 ENCOUNTER — TRANSCRIBE ORDERS (OUTPATIENT)
Dept: ADMINISTRATIVE | Facility: HOSPITAL | Age: 61
End: 2021-05-22

## 2021-05-22 DIAGNOSIS — Z78.0 POST-MENOPAUSE: Primary | ICD-10-CM

## 2021-05-26 ENCOUNTER — HOSPITAL ENCOUNTER (OUTPATIENT)
Dept: PHYSICAL THERAPY | Facility: CLINIC | Age: 61
Setting detail: RECURRING SERIES
End: 2021-06-10
Attending: NURSE PRACTITIONER

## 2021-05-28 ENCOUNTER — OFFICE VISIT CONVERTED (OUTPATIENT)
Dept: SURGERY | Facility: CLINIC | Age: 61
End: 2021-05-28
Attending: NURSE PRACTITIONER

## 2021-05-28 ENCOUNTER — CONVERSION ENCOUNTER (OUTPATIENT)
Dept: SURGERY | Facility: CLINIC | Age: 61
End: 2021-05-28

## 2021-05-28 ENCOUNTER — TRANSCRIBE ORDERS (OUTPATIENT)
Dept: LAB | Facility: HOSPITAL | Age: 61
End: 2021-05-28

## 2021-05-28 DIAGNOSIS — Z01.818 PRE-OP TESTING: Primary | ICD-10-CM

## 2021-06-05 NOTE — H&P
History and Physical      Patient Name: Panda Rinaldi   Patient ID: [id number]   Sex: Female   YOB: 1960    Primary Care Provider: Sadie JOSHI   Referring Provider: Sadie JOSHI    Visit Date: May 28, 2021    Provider: MADELYN Melo   [place] [facility]   Location Address: [address] [place] [address]  [id number]   Location Phone: [phone]          Chief Complaint  · Age 50 or over  · Epigastric Pain  · Difficulty Swallowing  · GERD  · Requesting EGD and Colonoscopy     change bowel habits       History Of Present Illness  The patient is a 60 year old  female presenting to the Surgical Specialist office on a referral from Sadie JOSHI.   Panda Rinaldi needs to have a diagnostic colonoscopy and EGD.   Patient states that they have had a colonoscopy. 10 years ago   Patient currently complains of: change in BM, GERD, difficulty swallowing, and epigastric pain   Patient Does have family history of colon cancer. Mother      Patient presents today on a referral from Sadie JOSHI for an EGD & Colonoscopy. Patient reports that she is with GERD and uses OTC to control symptoms. Reports dysphagia and feeling as if food and medications get stuck in anterior chest. Admits to epigastric pressure. Denies any lower abdominal pain. Admits to change in bowel habits with pencil-like stools. Denies any rectal bleeding. Admits to a family history of colon cancer with her Mother.     Last colonoscopy was 10 years ago at [place] and was normal per patient.       Past Medical History  Disease Name Date Onset Notes   Arthritis --  --    Bone Density Screening 10/15/14 Osteoporosis- Tried Fosamax in the past   Cervical cancer screening 4/25/19 2016   Heart Disease --  --    Hyperlipemia --  --    Palpitations --  --    Pap smear for cervical cancer screening 4/30/19 --    Pap Smear for Vaginal Cancer Screening 4/30/19 --    Reflux --  --    Screening Mammogram  1/25/21 --    Seasonal allergies --  --    Shoulder pain --  --          Past Surgical History  Procedure Name Date Notes   Colonoscopy 10/2015 [place]   Shoulder surgery 2014 [facility]         Medication List  Name Date Started Instructions   acetaminophen 325 mg oral capsule  take 2 capsules by oral route daily   cetirizine 10 mg oral tablet  take 1 tablet (10 mg) by oral route once daily   Flonase Allergy Relief 50 mcg/actuation nasal spray,suspension  spray 1 spray (50 mcg) in each nostril by intranasal route once daily   Lubrifresh PM 83-15 % ophthalmic (eye) ointment  apply a small amount to affected eye 3 times a day   Maxitrol 3.5mg/mL-10,000 unit/mL-0.1 % ophthalmic (eye) drops,suspension  instill 1 drop into affected eye(s) by ophthalmic route every 4 hours   meloxicam 7.5 mg oral tablet  take 1 tablet (7.5 mg) by oral route once daily   Pravachol 20 mg oral tablet  take 1 tablet (20 mg) by oral route once daily   Suprep Bowel Prep Kit 17.5-3.13-1.6 gram oral recon soln 05/28/2021 take as directed   Toprol XL 25 mg oral tablet extended release 24 hr 10/01/2020 take 1 tablet (25 mg) by oral route once daily for 90 days   Vitamin D2 1,250 mcg (50,000 unit) oral capsule  take 1 capsule by oral route weekly         Allergy List  Allergen Name Date Reaction Notes   Azasite --  --  --    hydrocodone-acetaminophen --  SOB/Vomiting/Numbness of body --    Latex --  --  --    tramadol --  SOB/Vomiting/Numbness of body --        Allergies Reconciled  Family Medical History  Disease Name Relative/Age Notes   Stroke  --    Heart Disease Father/   Father   Cancer, Unspecified Mother/  Sister/   Mother; Sister   Breast Neoplasm  --    Heart Attack (MI) Mother/   --    Family history of colon cancer Mother/60s   --    Family history of breast cancer Sister/60s   --    Diabetes Father/   --          Social History  Finding Status Start/Stop Quantity Notes   Alcohol Never --/-- --  --    Homemaker. --  --/-- --  --   "  lives with spouse --  --/-- --  --     --  --/-- --  --    Recreational Drug Use Never --/-- --  no   Tobacco Former --/-- --  --          Review of Systems  · Constitutional  o Denies  o : fever, chills  · Eyes  o Denies  o : yellowish discoloration of eyes  · HENT  o Denies  o : difficulty swallowing  · Cardiovascular  o Denies  o : chest pain, chest pain on exertion  · Respiratory  o Denies  o : shortness of breath  · Gastrointestinal  o Admits  o : dysphagia, heartburn, reflux, narrow stools  o Denies  o : nausea, vomiting, diarrhea, constipation, abdominal pain  · Genitourinary  o Denies  o : abnormal color of urine  · Integument  o Denies  o : rash  · Neurologic  o Denies  o : tingling or numbness  · Musculoskeletal  o Denies  o : joint pain  · Endocrine  o Denies  o : weight gain, weight loss      Vitals  Date Time BP Position Site L\R Cuff Size HR RR TEMP (F) WT  HT  BMI kg/m2 BSA m2 O2 Sat FR L/min FiO2        05/28/2021 09:41 AM       16  138lbs 4oz 5'  4\" 23.73 1.68             Physical Examination  · Constitutional  o Appearance  o : well developed, well-nourished, patient in no apparent distress  · Head and Face  o Head  o :   § Inspection  § : atraumatic, normocephalic  o Face  o :   § Inspection  § : no facial lesions  · Eyes  o Conjunctivae  o : conjunctivae normal  o Sclerae  o : sclerae white  · Neck  o Inspection/Palpation  o : normal appearance, no masses or tenderness, trachea midline  · Respiratory  o Respiratory Effort  o : breathing unlabored  · Skin and Subcutaneous Tissue  o General Inspection  o : no lesions present, no areas of discoloration, skin turgor normal, texture normal  · Neurologic  o Mental Status Examination  o :   § Orientation  § : grossly oriented to person, place and time  § Attention  § : attention normal, concentration abilities normal  § Fund of Knowledge  § : fund of knowledge within normal limits, patient aware of current events  o Gait and Station  o : " normal gait, able to stand without difficulty  · Psychiatric  o Judgement and Insight  o : judgment and insight intact  o Mood and Affect  o : mood normal, affect appropriate              Assessment  · Abdominal Pain, Epigastric     789.06/R10.13  · Difficulty in swallowing     787.20/R13.10  · Family History of Colon Cancer     V16.0/Z80.0  · GERD (gastroesophageal reflux disease)     530.81/K21.9  · Change in bowel habits     787.99/R19.4  · Pre-op testing     V72.84/Z01.818    Problems Reconciled  Plan  · Orders  o Consent for Colonoscopy Screening-Possible risk/complications, benefits, and alternatives to surgical or invasive procedure have been explained to patient and/or legal guardian. -Patient has been evaluated and can tolerate anesthesia and/or sedation. Risks, benefits, and alternatives to anesthesia and sedation have been explained to patient and/or legal guardian. () - 787.99/R19.4, V16.0/Z80.0 - 07/15/2021  o Consent for Esophagogastroduodenoscopy (EGD) with dilation - Possible risks/complications, benefits, and alternatives to surgical or invasive procedure have been explained to patient and/or legal guardian. - Patient has been evaluated and can tolerate anesthesia and/or sedation. Risks, benefits, and alternatives to anesthesia and sedation have been explained to patient and/or legal guardian. (34301) - 787.20/R13.10, 530.81/K21.9, 789.06/R10.13 - 07/15/2021  o [facility] Pre-Op Covid-19 Screening (80957) - V72.84/Z01.818 - 07/09/2021   [address]  · Medications  o Medications have been Reconciled  o Transition of Care or Provider Policy  · Instructions  o Surgical Facility: [facility]  o Handouts Provided Pre-Procedure Instructions including date, time, and location of procedure.   o PLAN: Proceeed with colonoscopy. Patient understands risks/benefits and is willing to proceed.   o PLAN: Proceeed with EGD. Patient understands risks/benefits and is willing to proceed.    o ***Surgical Orders***  o RISK AND BENEFITS:  o Given these options, the patient has verbally expressed an understanding of the risks of the surgery and finds these risks acceptable. Will proceed with surgery as soon as possible.  o O.R. PREP: Per protocol   o IV: Per Anesthesia  o Please sign permit for: Colonoscopy with possible biopsies by Dr. Story.  o Please sign permit for: Esophagogastroduodenoscopy with possible biopsies and dilation by Dr. Story.   o The above History and Physical Examination has been completed within 30 days of admission.  o ***Patient Status***  o Outpatient  o Follow up in the in the office post procedure.  o Electronically Identified Patient Education Materials Provided Electronically  · Disposition  o Call or Return if symptoms worsen or persist.  o EMR dragon/transcription disclaimer: Much of this encounter note is an electronic transcription/translation of spoken language to printed text. Electronic translation of spoken language may permit erroneous, or at times nonsensical words or phrases to be inadvertently trasncribed; although I have reviewed the note for such errors, some may still exist.            Electronically Signed by: MADELYN Melo -Author on May 28, 2021 09:46:14 AM

## 2021-06-10 ENCOUNTER — TREATMENT (OUTPATIENT)
Dept: PHYSICAL THERAPY | Facility: CLINIC | Age: 61
End: 2021-06-10

## 2021-06-10 DIAGNOSIS — R29.898 WEAKNESS OF LEFT HIP: ICD-10-CM

## 2021-06-10 DIAGNOSIS — M25.559 HIP PAIN: Primary | ICD-10-CM

## 2021-06-10 PROCEDURE — 97110 THERAPEUTIC EXERCISES: CPT | Performed by: PHYSICAL THERAPIST

## 2021-06-10 NOTE — PROGRESS NOTES
Re-Assessment / Re-Certification        Patient: Panda Rinaldi   : 1960  Diagnosis/ICD-10 Code:  No primary diagnosis found.  Referring practitioner: No ref. provider found  Date of Initial Visit: No linked episodes  Today's Date: 6/10/2021  Patient seen for Visit count could not be calculated. Make sure you are using a visit which is associated with an episode. sessions      Subjective:   Panda Rinaldi  Subjective Questionnaire: LEFS: 46/80  Clinical Progress: improved  Home Program Compliance: No  Treatment has included: therapeutic exercise and manual therapy    Subjective Evaluation    History of Present Illness    Subjective comment: Pt denies changes since starting therapy.  She states the pain is mostly in the front of her hip but she feels it into her L low back sometimes too.  Pt states sitting and then standing is uncomfortable.  Pt states walking is uncomfortable at times too.  Pain  Current pain rating: 3  At best pain ratin  At worst pain ratin  Quality: sharp and dull ache    Patient Goals  Patient goals for therapy: decreased pain, increased strength and independence with ADLs/IADLs         Objective          Strength/Myotome Testing     Left Hip   Planes of Motion   Flexion: 4- (painful)  Extension: 4-  Abduction: 4-    Right Hip   Planes of Motion   Flexion: 4+  Extension: 4  Abduction: 4    Left Knee   Flexion: 4+  Extension: 4-    Right Knee   Flexion: 5  Extension: 5    Left Ankle/Foot   Dorsiflexion: 5  Plantar flexion: 4+    Right Ankle/Foot   Dorsiflexion: 5  Plantar flexion: 5      Assessment & Plan     Assessment  Impairments: abnormal gait, abnormal or restricted ROM, impaired balance, impaired physical strength, lacks appropriate home exercise program and pain with function  Assessment details: Pt has had a few PT treatment sessions but progress towards goals has been limited due to limited number of treatment sessions.  She continues to have weakness and pain in her L hip that is  limiting her ability to perform functional activities.  She has had a slight improvement in her hip strength.  She will benefit from continuing with PT to increase strength, decrease pain, and improve tolerance to functional activities.  Prognosis: good  Functional Limitations: sleeping, walking, uncomfortable because of pain, sitting, standing and stooping  Goals  Plan Goals: 1. The patient complains of L hip pain.   LTG 1: 4 weeks:  The patient will report a pain rating of 2/10 or better in order to improve  tolerance to activities of daily living and improve sleep quality.    STATUS:  progressing   STG 1a: 2 weeks:  The patient will report a pain rating of 4/10 or better.    STATUS:  progressing   TREATMENT:  Therapeutic exercises, manual therapy, aquatic therapy, home exercise   instruction, and modalities as needed for pain to include:  electrical stimulation, moist heat, ice,   ultrasound, and diathermy.       2. The patient demonstrates weakness of the L hip.   LTG 2: 4 weeks:  The patient will demonstrate 4+/5 strength for L hip flexion, abduction,  and extension in order to improve hip stability.    STATUS:  progressing   STG 2a: 2 weeks:  The patient will demonstrate 4/5 strength for L hip flexion, abduction,  and extension.    STATUS:  progressing   TREATMENT: Therapeutic exercises, manual therapy, aquatic therapy, home exercise instruction,  and modalities as needed for pain to include:  electrical stimulation, moist heat, ice, ultrasound, and   diathermy.       3. The patient has gait dysfunction.   LTG 3: 4 weeks:  The patient will ambulate without assistive device, independently, for   community distances with minimal limp to the L lower extremity in order to improve mobility and allow   patient to perform activities such as grocery shopping with greater ease.    STATUS:  progressing   STG 3a: 2 weeks: The patient will be independent in [facility].      STATUS:  progressing   TREATMENT: Gait training,  aquatic therapy, therapeutic exercise, and home exercise instruction.    4. Mobility: Walking/Moving Around Functional Limitation     LTG 4: 4 weeks:  The patient will demonstrate 1-19% limitation by achieving a score of 65 on the LEFS.    STATUS:  progressing   STG 4a: 2 weeks:  The patient will demonstrate 20-39% limitation by achieving a score of 57 on the LEFS.      STATUS:  progressing   TREATMENT:  Manual therapy, therapeutic exercise, home exercise instruction, and modalities as needed to include: moist heat, electrical stimulation, and ultrasound.      Plan  Therapy options: will be seen for skilled physical therapy services  Planned modality interventions: cryotherapy, dry needling, electrical stimulation/Tunisian stimulation, ultrasound and hydrotherapy  Planned therapy interventions: flexibility, functional ROM exercises, home exercise program, joint mobilization, manual therapy, neuromuscular re-education, soft tissue mobilization, spinal/joint mobilization, strengthening, stretching, balance/weight-bearing training, gait training and therapeutic activities  Frequency: 3x week  Duration in weeks: 12  Treatment plan discussed with: patient      Progress toward previous goals: Partially Met    See Exercise, Manual, and Modality Logs for complete treatment.       Recommendations: Continue as planned  Timeframe: 3 months  Prognosis to achieve goals: good    PT Signature: Haydee Galaviz PT      Based upon review of the patient's progress and continued therapy plan, it is my medical opinion that Tae Range should continue physical therapy treatment at [facility]  [address] [facility] [place] [address] [phone].    Signature: __________________________________      Timed:         Manual Therapy:         mins  16156;     Therapeutic Exercise:    17     mins  14984;     Neuromuscular Teresita:        mins  53062;    Therapeutic Activity:          mins  84239;     Gait  Training:           mins  46743;     Ultrasound:          mins  81226;    Ionto                                   mins   52900  Self Care                            mins   25432  Aquatic                               mins 18133      Un-Timed:  Electrical Stimulation:         mins  94541 ( );  Dry Needling          mins self-pay  Traction          mins 39300  Low Eval          Mins  49995  Mod Eval          Mins  32259  High Eval                            Mins  21215  Re-Eval                               mins  32431      Timed Treatment:   17   mins   Total Treatment:     17   mins

## 2021-06-11 ENCOUNTER — TRANSCRIBE ORDERS (OUTPATIENT)
Dept: PHYSICAL THERAPY | Facility: CLINIC | Age: 61
End: 2021-06-11

## 2021-06-11 DIAGNOSIS — M25.552 LEFT HIP PAIN: Primary | ICD-10-CM

## 2021-06-15 ENCOUNTER — TREATMENT (OUTPATIENT)
Dept: PHYSICAL THERAPY | Facility: CLINIC | Age: 61
End: 2021-06-15

## 2021-06-15 DIAGNOSIS — R29.898 WEAKNESS OF LEFT HIP: ICD-10-CM

## 2021-06-15 DIAGNOSIS — M25.559 HIP PAIN: Primary | ICD-10-CM

## 2021-06-15 PROCEDURE — 97110 THERAPEUTIC EXERCISES: CPT | Performed by: PHYSICAL THERAPIST

## 2021-06-15 NOTE — PROGRESS NOTES
Physical Therapy Daily Progress Note    VISIT#: 2    Subjective   Tae Range reports 2/10 pain today.    Pain Rating (0-10): 2    Objective     See Exercise, Manual, and Modality Logs for complete treatment.     Assessment/Plan   Pt is able to tolerate core and hip strengthening exercises being advanced today.  She has more difficulty with strengthening exercises that involve the L hip flexors.  Will continue advancing exercises to improve hip stability and decrease pain.     Progress per Plan of Care and Progress strengthening /stabilization /functional activity            Timed:         Manual Therapy:         mins  03622;     Therapeutic Exercise:    30     mins  51872;     Neuromuscular Teresita:        mins  66758;    Therapeutic Activity:          mins  80649;     Gait Training:           mins  26016;     Ultrasound:          mins  93403;    Ionto                                   mins   33941  Self Care                            mins   83538  Canalith Repos                   mins  64579    Un-Timed:  Electrical Stimulation:         mins  78557 ( );  Dry Needling          mins self-pay  Traction          mins 22342  Low Eval          Mins  94342  Mod Eval          Mins  43961  High Eval                            Mins  86501  Re-Eval                               mins  23863    Timed Treatment:   30   mins   Total Treatment:     30   mins    Haydee Galaviz PT  Physical Therapist

## 2021-06-17 DIAGNOSIS — J30.2 SEASONAL ALLERGIC RHINITIS, UNSPECIFIED TRIGGER: Primary | ICD-10-CM

## 2021-06-17 PROBLEM — M54.2 NECK PAIN: Status: ACTIVE | Noted: 2021-06-17

## 2021-06-17 PROBLEM — R00.2 PALPITATIONS: Status: ACTIVE | Noted: 2021-06-17

## 2021-06-17 PROBLEM — K21.9 ACID REFLUX: Status: ACTIVE | Noted: 2021-06-17

## 2021-06-17 PROBLEM — M50.20 PROLAPSED CERVICAL INTERVERTEBRAL DISC: Status: ACTIVE | Noted: 2021-06-17

## 2021-06-17 PROBLEM — E78.5 HYPERLIPEMIA: Status: ACTIVE | Noted: 2021-06-17

## 2021-06-17 PROBLEM — I51.9 HEART DISEASE: Status: ACTIVE | Noted: 2021-06-17

## 2021-06-17 PROBLEM — M54.10 RADICULAR PAIN: Status: ACTIVE | Noted: 2021-06-17

## 2021-06-17 PROBLEM — M25.519 SHOULDER PAIN: Status: ACTIVE | Noted: 2021-06-17

## 2021-06-17 PROBLEM — M19.90 OSTEOARTHRITIS: Status: ACTIVE | Noted: 2021-06-17

## 2021-06-17 RX ORDER — FLUTICASONE PROPIONATE 50 MCG
SPRAY, SUSPENSION (ML) NASAL
Status: ON HOLD | COMMUNITY
End: 2021-07-15

## 2021-06-17 RX ORDER — CETIRIZINE HYDROCHLORIDE 10 MG/1
TABLET ORAL
COMMUNITY
Start: 2021-03-12 | End: 2021-06-17 | Stop reason: SDUPTHER

## 2021-06-17 RX ORDER — IBUPROFEN 800 MG/1
800 TABLET ORAL EVERY 6 HOURS PRN
COMMUNITY
End: 2021-06-17 | Stop reason: SDUPTHER

## 2021-06-17 RX ORDER — CETIRIZINE HYDROCHLORIDE 10 MG/1
10 TABLET ORAL DAILY
Qty: 90 TABLET | Refills: 1 | Status: SHIPPED | OUTPATIENT
Start: 2021-06-17 | End: 2022-04-08 | Stop reason: SDUPTHER

## 2021-06-17 RX ORDER — IBUPROFEN 800 MG/1
800 TABLET ORAL EVERY 6 HOURS PRN
Qty: 90 TABLET | Refills: 1 | Status: SHIPPED | OUTPATIENT
Start: 2021-06-17 | End: 2022-03-02 | Stop reason: SDUPTHER

## 2021-06-17 RX ORDER — MELOXICAM 7.5 MG/1
TABLET ORAL
COMMUNITY
End: 2021-06-17 | Stop reason: SDUPTHER

## 2021-06-17 RX ORDER — ERGOCALCIFEROL 1.25 MG/1
CAPSULE ORAL
Status: ON HOLD | COMMUNITY
End: 2021-07-15

## 2021-06-17 RX ORDER — ATORVASTATIN CALCIUM 20 MG/1
20 TABLET, FILM COATED ORAL DAILY
COMMUNITY
End: 2021-06-17

## 2021-06-17 RX ORDER — PRAVASTATIN SODIUM 20 MG
TABLET ORAL
COMMUNITY
End: 2021-06-17 | Stop reason: SDUPTHER

## 2021-06-17 RX ORDER — MINERAL OIL AND WHITE PETROLATUM 150; 830 MG/G; MG/G
OINTMENT OPHTHALMIC
COMMUNITY
End: 2021-07-13

## 2021-06-17 RX ORDER — PRAVASTATIN SODIUM 20 MG
20 TABLET ORAL NIGHTLY
Qty: 90 TABLET | Refills: 1 | Status: SHIPPED | OUTPATIENT
Start: 2021-06-17 | End: 2021-07-08 | Stop reason: SDUPTHER

## 2021-06-17 NOTE — TELEPHONE ENCOUNTER
Caller: Panda Rinaldi    Relationship: Self    Best call back number: [phone]     Medication needed: ZYRTEC, ATORVASTATIN, IBUPROFEN     When do you need the refill by: ASAP    What additional details did the patient provide when requesting the medication: PATIENT STATES SHE IS COMPLETELY OUT    Does the patient have less than a 3 day supply:  [x] Yes  [] No    What is the patient's preferred pharmacy:    [facility]  [address] [place] [address] [phone]

## 2021-06-18 ENCOUNTER — TREATMENT (OUTPATIENT)
Dept: PHYSICAL THERAPY | Facility: CLINIC | Age: 61
End: 2021-06-18

## 2021-06-18 DIAGNOSIS — R29.898 WEAKNESS OF LEFT HIP: ICD-10-CM

## 2021-06-18 DIAGNOSIS — M25.559 HIP PAIN: Primary | ICD-10-CM

## 2021-06-18 PROCEDURE — 97110 THERAPEUTIC EXERCISES: CPT | Performed by: PHYSICAL THERAPIST

## 2021-06-18 NOTE — PROGRESS NOTES
Physical Therapy Daily Treatment Note      Patient: Panda Rinaldi   : 1960  Referring practitioner: MADELYN Mendiola  Date of Initial Visit: Type: THERAPY  Noted: 6/10/2021  Today's Date: 2021  Patient seen for 7 sessions         Panda Rinaldi reports her pain is 2-3/10 today which is about average. She says she felt a little better after last session doing the exercises.       Objective   See Exercise, Manual, and Modality Logs for complete treatment.       Assessment/Plan pt is tolerating exercises a little better, but still limited with exercises on LLE due to hip pain and weakness. Continue to address L hip stability with exercise progression    Visit Diagnoses:    ICD-10-CM ICD-9-CM   1. Hip pain  M25.559 719.45   2. Weakness of left hip  R29.898 729.89       Progress strengthening /stabilization /functional activity             Timed:         Manual Therapy:         mins  79619;     Therapeutic Exercise:    30     mins  61614;     Neuromuscular Teresita:        mins  98004;    Therapeutic Activity:          mins  14275;     Gait Training:           mins  01427;     Ultrasound:          mins  57088;    Ionto                                   mins   87347  Self Care                            mins   54826    Un-Timed:  Electrical Stimulation:         mins  64605 ( );  Dry Needling          mins self-pay  Traction          mins 78075  Low Eval          Mins  76191  Mod Eval          Mins  96738  High Eval                            Mins  64536  Re-Eval                               mins  13518    Timed Treatment:   30   mins   Total Treatment:     30   mins    Tarsha Ortiz PT  Physical Therapist

## 2021-06-22 ENCOUNTER — TREATMENT (OUTPATIENT)
Dept: PHYSICAL THERAPY | Facility: CLINIC | Age: 61
End: 2021-06-22

## 2021-06-22 DIAGNOSIS — M25.559 HIP PAIN: Primary | ICD-10-CM

## 2021-06-22 DIAGNOSIS — R29.898 WEAKNESS OF LEFT HIP: ICD-10-CM

## 2021-06-22 PROCEDURE — 97140 MANUAL THERAPY 1/> REGIONS: CPT | Performed by: PHYSICAL THERAPIST

## 2021-06-22 NOTE — PROGRESS NOTES
Physical Therapy Daily Progress Note    VISIT#: 4    Subjective   Tae Range reports she feels like she strained her hip since last session and is hurting more today.  She states she doesn't think she can do all of the exercises today.     Pain Rating (0-10): 6    Objective     See Exercise, Manual, and Modality Logs for complete treatment.     Assessment/Plan  Due to pt having increased pain today exercises were not performed.  Manual therapy to L hip was performed with a slight improvement in pain rating following treatment.  Will attempt to add strengthening exercises back in next session if pain is improved.    Progress per Plan of Care and Progress strengthening /stabilization /functional activity            Timed:         Manual Therapy:    10     mins  70225;     Therapeutic Exercise:         mins  95788;     Neuromuscular Teresita:        mins  66589;    Therapeutic Activity:          mins  65775;     Gait Training:           mins  67479;     Ultrasound:          mins  70225;    Ionto                                   mins   37906  Self Care                            mins   14272  Canalith Repos                   mins  01940    Un-Timed:  Electrical Stimulation:         mins  26910 ( );  Dry Needling          mins self-pay  Traction          mins 98595  Low Eval          Mins  53570  Mod Eval          Mins  97436  High Eval                            Mins  59293  Re-Eval                               mins  88596    Timed Treatment:   10   mins   Total Treatment:     25   mins    Haydee Galaviz, PT  Physical Therapist

## 2021-06-24 ENCOUNTER — TREATMENT (OUTPATIENT)
Dept: PHYSICAL THERAPY | Facility: CLINIC | Age: 61
End: 2021-06-24

## 2021-06-24 DIAGNOSIS — M25.559 HIP PAIN: ICD-10-CM

## 2021-06-24 DIAGNOSIS — R29.898 WEAKNESS OF LEFT HIP: Primary | ICD-10-CM

## 2021-06-24 PROCEDURE — 97110 THERAPEUTIC EXERCISES: CPT | Performed by: PHYSICAL THERAPIST

## 2021-06-24 PROCEDURE — 97140 MANUAL THERAPY 1/> REGIONS: CPT | Performed by: PHYSICAL THERAPIST

## 2021-06-24 NOTE — PROGRESS NOTES
Physical Therapy Daily Progress Note    VISIT#: 5    Subjective   Tae Range reports her hip feels better today than it did the other day.   Pain Rating (0-10): 3    Objective     See Exercise, Manual, and Modality Logs for complete treatment.     Assessment/Plan  Pt continues to have increased pain in anterior hip/hip flexor.  She has poor tolerance of strengthening exercises.  Will continue with  Manual stretching and strengthening exercises.    Progress per Plan of Care and Progress strengthening /stabilization /functional activity            Timed:         Manual Therapy:    8     mins  85837;     Therapeutic Exercise:    20     mins  37475;     Neuromuscular Teresita:        mins  11682;    Therapeutic Activity:          mins  40944;     Gait Training:           mins  17784;     Ultrasound:          mins  78758;    Ionto                                   mins   70185  Self Care                            mins   02401  Canalith Repos                   mins  11235    Un-Timed:  Electrical Stimulation:         mins  44524 ( );  Dry Needling          mins self-pay  Traction          mins 44567  Low Eval          Mins  41850  Mod Eval          Mins  71510  High Eval                            Mins  41308  Re-Eval                               mins  18730    Timed Treatment:   28   mins   Total Treatment:     28   mins    Haydee Galaviz PT  Physical Therapist

## 2021-06-28 ENCOUNTER — TREATMENT (OUTPATIENT)
Dept: PHYSICAL THERAPY | Facility: CLINIC | Age: 61
End: 2021-06-28

## 2021-06-28 DIAGNOSIS — M25.559 HIP PAIN: ICD-10-CM

## 2021-06-28 DIAGNOSIS — R29.898 WEAKNESS OF LEFT HIP: Primary | ICD-10-CM

## 2021-06-28 PROCEDURE — 97140 MANUAL THERAPY 1/> REGIONS: CPT | Performed by: PHYSICAL THERAPIST

## 2021-06-28 PROCEDURE — 97110 THERAPEUTIC EXERCISES: CPT | Performed by: PHYSICAL THERAPIST

## 2021-06-28 NOTE — PROGRESS NOTES
Physical Therapy Daily Treatment Note      Patient: Panda Rinaldi   : 1960  Referring practitioner: MADELYN Mendiola  Date of Initial Visit: Type: THERAPY  Noted: 6/10/2021  Today's Date: 2021  Patient seen for 6 sessions         Panda iRnaldi reports her hip pain is continuing to calm down. She says she doesn't want to push it today. Requests less reps of exercises. Reports 3/10 pain      Objective   See Exercise, Manual, and Modality Logs for complete treatment.       Assessment/Plan Pt a little more tolerable of exercises this date, but still feeling deep hip pain with most exercises. Continue with stretching and advancement of exercises as tolerated.     Visit Diagnoses:    ICD-10-CM ICD-9-CM   1. Weakness of left hip  R29.898 729.89   2. Hip pain  M25.559 719.45       Progress per Plan of Care             Timed:         Manual Therapy:    8     mins  96052;     Therapeutic Exercise:    22     mins  36711;     Neuromuscular Teresita:        mins  71109;    Therapeutic Activity:          mins  90065;     Gait Training:           mins  93618;     Ultrasound:          mins  24991;    Ionto                                   mins   49237  Self Care                            mins   06982  Aquatic therapy                  mins   29927    Un-Timed:  Electrical Stimulation:         mins  29337 ( );  Dry Needling          mins self-pay  Traction          mins 38333  Low Eval          Mins  37802  Mod Eval          Mins  48075  High Eval                            Mins  79701  Re-Eval                               mins  89690    Timed Treatment:   30   mins   Total Treatment:     30   mins    Tarsha Ortiz, PT  Physical Therapist

## 2021-07-01 ENCOUNTER — TELEPHONE (OUTPATIENT)
Dept: PHYSICAL THERAPY | Facility: CLINIC | Age: 61
End: 2021-07-01

## 2021-07-07 ENCOUNTER — TREATMENT (OUTPATIENT)
Dept: PHYSICAL THERAPY | Facility: CLINIC | Age: 61
End: 2021-07-07

## 2021-07-07 DIAGNOSIS — R29.898 WEAKNESS OF LEFT HIP: Primary | ICD-10-CM

## 2021-07-07 DIAGNOSIS — M25.559 HIP PAIN: ICD-10-CM

## 2021-07-07 PROCEDURE — 97140 MANUAL THERAPY 1/> REGIONS: CPT | Performed by: PHYSICAL THERAPIST

## 2021-07-07 RX ORDER — METOPROLOL SUCCINATE 25 MG/1
25 TABLET, EXTENDED RELEASE ORAL DAILY
COMMUNITY
Start: 2021-06-17 | End: 2021-10-04 | Stop reason: SDUPTHER

## 2021-07-07 NOTE — PROGRESS NOTES
Re-Assessment / Re-Certification        Patient: Panda Rinaldi   : 1960  Diagnosis/ICD-10 Code:  Weakness of left hip [R29.898]  Referring practitioner: MADELYN Mendiola  Date of Initial Visit: Type: THERAPY  Noted: 6/10/2021  Today's Date: 2021  Patient seen for 7 sessions      Subjective:   Panda Rinaldi reports her left his is getting worse and doesn't think PT is helping. She has an appt with Sadie Fragoso tomorrow to follow up.   Subjective Questionnaire: LEFS: 61/80  Clinical Progress: worse  Home Program Compliance: N/A  Treatment has included: therapeutic exercise and manual therapy    Objective          Strength/Myotome Testing     Left Hip   Planes of Motion   Flexion: 3+ (painful)  Extension: 4-  Abduction: 4- (painful)    Right Hip   Planes of Motion   Flexion: 4+  Extension: 4  Abduction: 4    Left Knee   Flexion: 4+  Extension: 4-    Right Knee   Flexion: 5  Extension: 5    Left Ankle/Foot   Dorsiflexion: 5  Plantar flexion: 4+    Right Ankle/Foot   Dorsiflexion: 5  Plantar flexion: 5    Tests       Thoracic   Negative slump.     Lumbar   Negative repeated extension and repeated flexion.       Assessment & Plan     Assessment  Impairments: abnormal gait, abnormal or restricted ROM, impaired balance, impaired physical strength, lacks appropriate home exercise program and pain with function  Assessment details: Pt pain in L his is worsening and has had no significant change in her L hip pain or function with skilled care. Pt was given extensive education over her hip pain and what arthritis is. She was educated to get a referral to see an orthopedic MD and talked pt through follow up imaging (xray vs MRI) and potential diagnosis although hip OA is most consistent with pt symptoms. Discussed conservative treatment (PT and injections and PT following up after injections ) as well as hip replacement surgery. Pt agreeable to D/C and to follow up with an orthopedic MD.  Prognosis: good  Functional  Limitations: sleeping, walking, uncomfortable because of pain, sitting, standing and stooping  Goals  Plan Goals: 1. The patient complains of L hip pain.   LTG 1: 4 weeks:  The patient will report a pain rating of 2/10 or better in order to improve  tolerance to activities of daily living and improve sleep quality.    STATUS:  Not met   STG 1a: 2 weeks:  The patient will report a pain rating of 4/10 or better.    STATUS: not met   TREATMENT:  Therapeutic exercises, manual therapy, aquatic therapy, home exercise   instruction, and modalities as needed for pain to include:  electrical stimulation, moist heat, ice,   ultrasound, and diathermy.       2. The patient demonstrates weakness of the L hip.   LTG 2: 4 weeks:  The patient will demonstrate 4+/5 strength for L hip flexion, abduction,  and extension in order to improve hip stability.    STATUS: not met   STG 2a: 2 weeks:  The patient will demonstrate 4/5 strength for L hip flexion, abduction,  and extension.    STATUS:  Not met   TREATMENT: Therapeutic exercises, manual therapy, aquatic therapy, home exercise instruction,  and modalities as needed for pain to include:  electrical stimulation, moist heat, ice, ultrasound, and   diathermy.       3. The patient has gait dysfunction.   LTG 3: 4 weeks:  The patient will ambulate without assistive device, independently, for   community distances with minimal limp to the L lower extremity in order to improve mobility and allow   patient to perform activities such as grocery shopping with greater ease.    STATUS:  Not met   STG 3a: 2 weeks: The patient will be independent in [facility].      STATUS:  Not met   TREATMENT: Gait training, aquatic therapy, therapeutic exercise, and home exercise instruction.    4. Mobility: Walking/Moving Around Functional Limitation     LTG 4: 4 weeks:  The patient will demonstrate 1-19% limitation by achieving a score of 65 on the LEFS.    STATUS:  Not met   STG 4a: 2 weeks:  The patient will  demonstrate 20-39% limitation by achieving a score of 57 on the LEFS.      STATUS:  Not met   TREATMENT:  Manual therapy, therapeutic exercise, home exercise instruction, and modalities as needed to include: moist heat, electrical stimulation, and ultrasound.      Plan  Therapy options: will not be seen for skilled physical therapy services  Treatment plan discussed with: patient  Plan details: D/C today        Visit Diagnoses:    ICD-10-CM ICD-9-CM   1. Weakness of left hip  R29.898 729.89   2. Hip pain  M25.559 719.45       Progress toward previous goals: Not Met    New Goals      Recommendations: Discharge      PT Signature: Tarsha Ortiz PT      Based upon review of the patient's progress and continued therapy plan, it is my medical opinion that Panda Rinaldi should continue physical therapy treatment at [facility]  [address] [place] [address]  [place] [address] [phone].    Signature: __________________________________  Sadie Fragoso APRN    Timed:         Manual Therapy:    10     mins  17753;     Therapeutic Exercise:         mins  41714;     Neuromuscular Teresita:        mins  06090;    Therapeutic Activity:          mins  99290;     Gait Training:           mins  57463;     Ultrasound:          mins  89214;    Ionto                                   mins   08914  Self Care                            mins   03024  Aquatic therapy                  mins   88325    Un-Timed:  Electrical Stimulation:         mins  40079 ( );  Dry Needling          mins self-pay  Traction          mins 33659  Low Eval          Mins  65085  Mod Eval          Mins  52506  High Eval                            Mins  61695  Re-Eval                               mins  04057    Timed Treatment:   10   mins   Total Treatment:     10   mins      Please sign and return via fax to [phone] . Thank you, [facility].

## 2021-07-08 ENCOUNTER — OFFICE VISIT (OUTPATIENT)
Dept: FAMILY MEDICINE CLINIC | Facility: CLINIC | Age: 61
End: 2021-07-08

## 2021-07-08 VITALS
HEIGHT: 64 IN | BODY MASS INDEX: 23.39 KG/M2 | OXYGEN SATURATION: 98 % | SYSTOLIC BLOOD PRESSURE: 134 MMHG | HEART RATE: 66 BPM | WEIGHT: 137 LBS | DIASTOLIC BLOOD PRESSURE: 89 MMHG

## 2021-07-08 DIAGNOSIS — E78.5 HYPERLIPIDEMIA, UNSPECIFIED HYPERLIPIDEMIA TYPE: ICD-10-CM

## 2021-07-08 DIAGNOSIS — G89.29 CHRONIC LEFT HIP PAIN: ICD-10-CM

## 2021-07-08 DIAGNOSIS — M25.552 CHRONIC LEFT HIP PAIN: ICD-10-CM

## 2021-07-08 DIAGNOSIS — M25.552 LEFT HIP PAIN: Primary | ICD-10-CM

## 2021-07-08 PROCEDURE — 96372 THER/PROPH/DIAG INJ SC/IM: CPT | Performed by: NURSE PRACTITIONER

## 2021-07-08 PROCEDURE — 99213 OFFICE O/P EST LOW 20 MIN: CPT | Performed by: NURSE PRACTITIONER

## 2021-07-08 RX ORDER — METHYLPREDNISOLONE ACETATE 40 MG/ML
40 INJECTION, SUSPENSION INTRA-ARTICULAR; INTRALESIONAL; INTRAMUSCULAR; SOFT TISSUE ONCE
Status: COMPLETED | OUTPATIENT
Start: 2021-07-08 | End: 2021-07-08

## 2021-07-08 RX ORDER — ATORVASTATIN CALCIUM 20 MG/1
20 TABLET, FILM COATED ORAL NIGHTLY
COMMUNITY
End: 2021-07-08 | Stop reason: SDUPTHER

## 2021-07-08 RX ORDER — ATORVASTATIN CALCIUM 20 MG/1
20 TABLET, FILM COATED ORAL NIGHTLY
Qty: 90 TABLET | Refills: 1 | Status: SHIPPED | OUTPATIENT
Start: 2021-07-08 | End: 2021-10-04 | Stop reason: SDUPTHER

## 2021-07-08 RX ADMIN — METHYLPREDNISOLONE ACETATE 40 MG: 40 INJECTION, SUSPENSION INTRA-ARTICULAR; INTRALESIONAL; INTRAMUSCULAR; SOFT TISSUE at 11:17

## 2021-07-08 NOTE — PROGRESS NOTES
Chief Complaint  Hip Pain (left)    Subjective            Tae N Range presents to [facility]  Pt has c/o left hip pain. The pain has been ongoing. Pt has been doing PT through [facility]. Pt states she is in more pain since doing PT. Pt would like imaging done, a referral to Ortho and a steroid inj.    Hip Pain           [facility]  Past Medical History:   Diagnosis Date   • Abnormal bone density screening 10/15/2014    osteoporosis- tried fosamax in the past   • Acid reflux    • Arthritis    • Heart disease    • Hyperlipemia    • Osteoporosis 10/15/2014   • Palpitations    • Seasonal allergies    • Shoulder pain        ALLERGY  Allergies   Allergen Reactions   • Hydrocodone-Acetaminophen Shortness Of Breath   • Tramadol Shortness Of Breath   • Azithromycin Unknown - Low Severity   • Latex Unknown - Low Severity        SURGICALHX  Past Surgical History:   Procedure Laterality Date   • COLONOSCOPY  10/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    • SHOULDER SURGERY Right 2014    IAC         SOCX  Social History     Tobacco Use   • Smoking status: Never Smoker   • Smokeless tobacco: Never Used   Substance Use Topics   • Alcohol use: Never       FAMHX  Family History   Problem Relation Age of Onset   • Cancer Mother    • Heart attack Mother    • Heart disease Father    • Cancer Sister    • Stroke Other    • Breast cancer Other         MEDSIGONLY  Current Outpatient Medications on File Prior to Visit   Medication Sig   • Acetaminophen 325 MG capsule acetaminophen 325 mg oral capsule take 2 capsules by oral route daily   Active   • artificial tears (LUBRIFRESH P.M.) ointment ophthalmic ointment Lubrifresh PM 83-15 % ophthalmic (eye) ointment apply a small amount to affected eye  3 times a day   Active   • cetirizine (zyrTEC) 10 MG tablet Take 1 tablet by mouth Daily.   • fluticasone (Flonase) 50 MCG/ACT nasal spray Flonase Allergy Relief 50 mcg/actuation nasal spray,suspension spray 1 spray (50 mcg) in each nostril by  "intranasal route once daily   Active   • ibuprofen (ADVIL,MOTRIN) 800 MG tablet Take 1 tablet by mouth Every 6 (Six) Hours As Needed for Mild Pain .   • metoprolol succinate XL (TOPROL-XL) 25 MG 24 hr tablet    • [DISCONTINUED] atorvastatin (LIPITOR) 20 MG tablet Take 20 mg by mouth Every Night.   • ergocalciferol (ERGOCALCIFEROL) 1.25 MG (48813 UT) capsule Vitamin D2 1,250 mcg (50,000 unit) oral capsule take 1 capsule by oral route weekly   Active   • neomycin-polymyxin-dexamethasone (Maxitrol) 0.1 % ophthalmic suspension 1 drop.   • [DISCONTINUED] pravastatin (Pravachol) 20 MG tablet Take 1 tablet by mouth Every Night.     No current facility-administered medications on file prior to visit.       Health Maintenance Due   Topic Date Due   • TDAP/TD VACCINES (1 - Tdap) Never done       Objective     /89   Pulse 66   Ht 162.6 cm (64\")   Wt 62.1 kg (137 lb)   SpO2 98%   BMI 23.52 kg/m²       Physical Exam  Constitutional:       General: She is not in acute distress.     Appearance: Normal appearance. She is not ill-appearing.   HENT:      Head: Normocephalic and atraumatic.      Mouth/Throat:      Pharynx: No oropharyngeal exudate or posterior oropharyngeal erythema.   Cardiovascular:      Rate and Rhythm: Normal rate and regular rhythm.      Pulses: Normal pulses.      Heart sounds: Normal heart sounds. No murmur heard.     Pulmonary:      Effort: Pulmonary effort is normal. No respiratory distress.      Breath sounds: Normal breath sounds.   Chest:      Chest wall: No tenderness.   Abdominal:      General: There is no distension.      Palpations: There is no mass.      Tenderness: There is no abdominal tenderness. There is no guarding.   Musculoskeletal:         General: No swelling.      Cervical back: Normal range of motion and neck supple.      Left hip: Tenderness present. Decreased range of motion.      Comments: Pain in left hip upon palpation and with any ROM   Skin:     General: Skin is warm and " dry.      Findings: No rash.   Neurological:      General: No focal deficit present.      Mental Status: She is alert and oriented to person, place, and time. Mental status is at baseline.      Gait: Gait normal.   Psychiatric:         Mood and Affect: Mood normal.         Behavior: Behavior normal.         Thought Content: Thought content normal.         Judgment: Judgment normal.           Result Review :                           Assessment and Plan        Diagnoses and all orders for this visit:    1. Left hip pain (Primary)    2. Hyperlipidemia, unspecified hyperlipidemia type  -     atorvastatin (LIPITOR) 20 MG tablet; Take 1 tablet by mouth Every Night.  Dispense: 90 tablet; Refill: 1    3. Chronic left hip pain  -     Ambulatory Referral to Orthopedic Surgery  -     MRI Hip Left Without Contrast; Future  -     methylPREDNISolone acetate (DEPO-medrol) injection 40 mg              Follow Up     Return if symptoms worsen or fail to improve.    Patient was given instructions and counseling regarding her condition or for health maintenance advice. Please see specific information pulled into the AVS if appropriate.     Panda Rinaldi  reports that she has never smoked. She has never used smokeless tobacco.

## 2021-07-13 NOTE — PRE-PROCEDURE INSTRUCTIONS
Pt. Instructed on laxative and skin prep, pre-op meds, clear liquid diet. Ok to take all meds except Vitamin D,Ibuprofen a.m. of procedure.     Patient fully vaccinated

## 2021-07-15 ENCOUNTER — ANESTHESIA EVENT (OUTPATIENT)
Dept: GASTROENTEROLOGY | Facility: HOSPITAL | Age: 61
End: 2021-07-15

## 2021-07-15 ENCOUNTER — ANESTHESIA (OUTPATIENT)
Dept: GASTROENTEROLOGY | Facility: HOSPITAL | Age: 61
End: 2021-07-15

## 2021-07-15 ENCOUNTER — HOSPITAL ENCOUNTER (OUTPATIENT)
Facility: HOSPITAL | Age: 61
Setting detail: HOSPITAL OUTPATIENT SURGERY
Discharge: HOME | End: 2021-07-15
Attending: SURGERY | Admitting: SURGERY

## 2021-07-15 VITALS
WEIGHT: 134.92 LBS | BODY MASS INDEX: 23.16 KG/M2 | TEMPERATURE: 97.1 F | SYSTOLIC BLOOD PRESSURE: 128 MMHG | HEART RATE: 68 BPM | OXYGEN SATURATION: 100 % | DIASTOLIC BLOOD PRESSURE: 66 MMHG | RESPIRATION RATE: 17 BRPM

## 2021-07-15 VITALS
DIASTOLIC BLOOD PRESSURE: 73 MMHG | HEIGHT: 64 IN | WEIGHT: 138.5 LBS | SYSTOLIC BLOOD PRESSURE: 116 MMHG | HEART RATE: 68 BPM | BODY MASS INDEX: 23.64 KG/M2 | OXYGEN SATURATION: 95 %

## 2021-07-15 VITALS — WEIGHT: 138.25 LBS | RESPIRATION RATE: 16 BRPM | BODY MASS INDEX: 23.6 KG/M2 | HEIGHT: 64 IN

## 2021-07-15 DIAGNOSIS — Z12.11 COLON CANCER SCREENING: ICD-10-CM

## 2021-07-15 DIAGNOSIS — R13.10 DYSPHAGIA: ICD-10-CM

## 2021-07-15 PROCEDURE — 25010000002 PROPOFOL 10 MG/ML EMULSION: Performed by: NURSE ANESTHETIST, CERTIFIED REGISTERED

## 2021-07-15 PROCEDURE — 88342 IMHCHEM/IMCYTCHM 1ST ANTB: CPT | Performed by: SURGERY

## 2021-07-15 PROCEDURE — 88305 TISSUE EXAM BY PATHOLOGIST: CPT | Performed by: SURGERY

## 2021-07-15 PROCEDURE — C1889 IMPLANT/INSERT DEVICE, NOC: HCPCS | Performed by: SURGERY

## 2021-07-15 DEVICE — DEV CLIP ENDO RESOLUTION360 CONTRL ROT 235CM: Type: IMPLANTABLE DEVICE | Site: COLON | Status: FUNCTIONAL

## 2021-07-15 RX ORDER — SODIUM CHLORIDE 0.9 % (FLUSH) 0.9 %
10 SYRINGE (ML) INJECTION AS NEEDED
Status: DISCONTINUED | OUTPATIENT
Start: 2021-07-15 | End: 2021-07-15 | Stop reason: HOSPADM

## 2021-07-15 RX ORDER — SODIUM CHLORIDE 0.9 % (FLUSH) 0.9 %
3 SYRINGE (ML) INJECTION EVERY 12 HOURS SCHEDULED
Status: DISCONTINUED | OUTPATIENT
Start: 2021-07-15 | End: 2021-07-15 | Stop reason: HOSPADM

## 2021-07-15 RX ORDER — PROPOFOL 10 MG/ML
VIAL (ML) INTRAVENOUS AS NEEDED
Status: DISCONTINUED | OUTPATIENT
Start: 2021-07-15 | End: 2021-07-15 | Stop reason: SURG

## 2021-07-15 RX ORDER — MULTIPLE VITAMINS W/ MINERALS TAB 9MG-400MCG
1 TAB ORAL DAILY
COMMUNITY
End: 2021-10-11

## 2021-07-15 RX ORDER — SODIUM CHLORIDE, SODIUM LACTATE, POTASSIUM CHLORIDE, CALCIUM CHLORIDE 600; 310; 30; 20 MG/100ML; MG/100ML; MG/100ML; MG/100ML
30 INJECTION, SOLUTION INTRAVENOUS CONTINUOUS
Status: DISCONTINUED | OUTPATIENT
Start: 2021-07-15 | End: 2021-07-15 | Stop reason: HOSPADM

## 2021-07-15 RX ADMIN — PROPOFOL 150 MCG/KG/MIN: 10 INJECTION, EMULSION INTRAVENOUS at 14:27

## 2021-07-15 RX ADMIN — SODIUM CHLORIDE, POTASSIUM CHLORIDE, SODIUM LACTATE AND CALCIUM CHLORIDE 30 ML/HR: 600; 310; 30; 20 INJECTION, SOLUTION INTRAVENOUS at 13:00

## 2021-07-15 RX ADMIN — PROPOFOL 100 MG: 10 INJECTION, EMULSION INTRAVENOUS at 14:27

## 2021-07-15 NOTE — ANESTHESIA PREPROCEDURE EVALUATION
Anesthesia Evaluation     Patient summary reviewed and Nursing notes reviewed   no history of anesthetic complications:  NPO Solid Status: > 8 hours  NPO Liquid Status: > 2 hours           Airway   Mallampati: I  TM distance: >3 FB  Neck ROM: full  No difficulty expected  Dental      Pulmonary - negative pulmonary ROS and normal exam    breath sounds clear to auscultation  Cardiovascular - normal exam  Exercise tolerance: good (4-7 METS)    Rhythm: regular    (+) hyperlipidemia,       Neuro/Psych- negative ROS  GI/Hepatic/Renal/Endo    (+)  GERD,      Musculoskeletal     Abdominal    Substance History - negative use     OB/GYN negative ob/gyn ROS         Other   arthritis,                      Anesthesia Plan    ASA 2     general   total IV anesthesia  intravenous induction     Anesthetic plan, all risks, benefits, and alternatives have been provided, discussed and informed consent has been obtained with: patient.

## 2021-07-15 NOTE — ANESTHESIA POSTPROCEDURE EVALUATION
Patient: Panda LEON Range    Procedure Summary     Date: 07/15/21 Room / Location: [facility] ENDOSCOPY 1 / [facility]    Anesthesia Start: 1424 Anesthesia Stop: 1525    Procedures:       ESOPHAGOGASTRODUODENOSCOPY with biopsies (N/A )      COLONOSCOPY with polypectomy/snare (N/A ) Diagnosis: (EPIGASTRIC PAIN,DYSPHAGIA,CHANGE IN BOWEL HABITS)    Surgeons: Adelfo Story MD Provider: Ivelisse Pierson MD    Anesthesia Type: general ASA Status: 2          Anesthesia Type: general    Vitals  No vitals data found for the desired time range.          Post Anesthesia Care and Evaluation    Patient location during evaluation: bedside  Patient participation: complete - patient participated  Level of consciousness: awake  Pain score: 0  Pain management: adequate  Airway patency: patent  Anesthetic complications: No anesthetic complications  PONV Status: none  Cardiovascular status: acceptable and stable  Respiratory status: acceptable and room air  Hydration status: acceptable    Comments: An Anesthesiologist personally participated in the most demanding procedures (including induction and emergence if applicable) in the anesthesia plan, monitored the course of anesthesia administration at frequent intervals and remained physically present and available for immediate diagnosis and treatment of emergencies.

## 2021-07-15 NOTE — H&P
Chief Complaint  · Age 50 or over  · Epigastric Pain  · Difficulty Swallowing  · GERD  · Requesting EGD and Colonoscopy      change bowel habits        History Of Present Illness  The patient is a 60 year old  female presenting to the Surgical Specialist office on a referral from Sadie JOSHI.   Panda Rinaldi needs to have a diagnostic colonoscopy and EGD.   Patient states that they have had a colonoscopy. 10 years ago   Patient currently complains of: change in BM, GERD, difficulty swallowing, and epigastric pain   Patient Does have family history of colon cancer. Mother       Patient presents today on a referral from Sadie JOSHI for an EGD & Colonoscopy. Patient reports that she is with GERD and uses OTC to control symptoms. Reports dysphagia and feeling as if food and medications get stuck in anterior chest. Admits to epigastric pressure. Denies any lower abdominal pain. Admits to change in bowel habits with pencil-like stools. Denies any rectal bleeding. Admits to a family history of colon cancer with her Mother.     Last colonoscopy was 10 years ago at [facility] and was normal per patient.        Past Medical History  Disease Name Date Onset Notes   Arthritis --  --    Bone Density Screening 10/15/14 Osteoporosis- Tried Fosamax in the past   Cervical cancer screening 4/25/19 2016   Heart Disease --  --    Hyperlipemia --  --    Palpitations --  --    Pap smear for cervical cancer screening 4/30/19 --    Pap Smear for Vaginal Cancer Screening 4/30/19 --    Reflux --  --    Screening Mammogram 1/25/21 --    Seasonal allergies --  --    Shoulder pain --  --            Past Surgical History  Procedure Name Date Notes   Colonoscopy 10/2015 Ganesh Mcclain   Shoulder surgery 2014 [facility] Rt Shoulder           Medication List  Name Date Started Instructions   acetaminophen 325 mg oral capsule   take 2 capsules by oral route daily   cetirizine 10 mg oral tablet   take 1 tablet (10 mg) by oral route once daily    Flonase Allergy Relief 50 mcg/actuation nasal spray,suspension   spray 1 spray (50 mcg) in each nostril by intranasal route once daily   Lubrifresh PM 83-15 % ophthalmic (eye) ointment   apply a small amount to affected eye 3 times a day   Maxitrol 3.5mg/mL-10,000 unit/mL-0.1 % ophthalmic (eye) drops,suspension   instill 1 drop into affected eye(s) by ophthalmic route every 4 hours   meloxicam 7.5 mg oral tablet   take 1 tablet (7.5 mg) by oral route once daily   Pravachol 20 mg oral tablet   take 1 tablet (20 mg) by oral route once daily   Suprep Bowel Prep Kit 17.5-3.13-1.6 gram oral recon soln 05/28/2021 take as directed   Toprol XL 25 mg oral tablet extended release 24 hr 10/01/2020 take 1 tablet (25 mg) by oral route once daily for 90 days   Vitamin D2 1,250 mcg (50,000 unit) oral capsule   take 1 capsule by oral route weekly           Allergy List  Allergen Name Date Reaction Notes   Azasite --  --  --    hydrocodone-acetaminophen --  SOB/Vomiting/Numbness of body --    Latex --  --  --    tramadol --  SOB/Vomiting/Numbness of body --         Allergies Reconciled  Family Medical History  Disease Name Relative/Age Notes   Stroke   --    Heart Disease Father/    Father   Cancer, Unspecified Mother/  Sister/    Mother; Sister   Breast Neoplasm   --    Heart Attack (MI) Mother/    --    Family history of colon cancer Mother/60s    --    Family history of breast cancer Sister/60s    --    Diabetes Father/    --            Social History  Finding Status Start/Stop Quantity Notes   Alcohol Never --/-- --  --    Homemaker. --  --/-- --  --    lives with spouse --  --/-- --  --     --  --/-- --  --    Recreational Drug Use Never --/-- --  no   Tobacco Former --/-- --  --            Review of Systems  · Constitutional  · Denies  · : fever, chills  · Eyes  · Denies  · : yellowish discoloration of eyes  · HENT  · Denies  · : difficulty swallowing  · Cardiovascular  · Denies  · : chest pain, chest pain on  "exertion  · Respiratory  · Denies  · : shortness of breath  · Gastrointestinal  · Admits  · : dysphagia, heartburn, reflux, narrow stools  · Denies  · : nausea, vomiting, diarrhea, constipation, abdominal pain  · Genitourinary  · Denies  · : abnormal color of urine  · Integument  · Denies  · : rash  · Neurologic  · Denies  · : tingling or numbness  · Musculoskeletal  · Denies  · : joint pain  · Endocrine  · Denies  · : weight gain, weight loss       Vitals                                       Date Time BP Position Site L\R Cuff Size HR RR TEMP (F) WT  HT  BMI kg/m2 BSA m2 O2 Sat FR L/min FiO2         05/28/2021 09:41 AM             16   138lbs 4oz 5'  4\" 23.73 1.68                   Physical Examination  · Constitutional  · Appearance  · : well developed, well-nourished, patient in no apparent distress  · Head and Face  · Head  · :   · Inspection  · : atraumatic, normocephalic  · Face  · :   · Inspection  · : no facial lesions  · Eyes  · Conjunctivae  · : conjunctivae normal  · Sclerae  · : sclerae white  · Neck  · Inspection/Palpation  · : normal appearance, no masses or tenderness, trachea midline  · Respiratory  · Respiratory Effort  · : breathing unlabored  · Skin and Subcutaneous Tissue  · General Inspection  · : no lesions present, no areas of discoloration, skin turgor normal, texture normal  · Neurologic  · Mental Status Examination  · :   · Orientation  · : grossly oriented to person, place and time  · Attention  · : attention normal, concentration abilities normal  · Fund of Knowledge  · : fund of knowledge within normal limits, patient aware of current events  · Gait and Station  · : normal gait, able to stand without difficulty  · Psychiatric  · Judgement and Insight  · : judgment and insight intact  · Mood and Affect  · : mood normal, affect appropriate                 Assessment  · Abdominal Pain, Epigastric     789.06/R10.13  · Difficulty in swallowing     787.20/R13.10  · Family History of Colon " Cancer     V16.0/Z80.0  · GERD (gastroesophageal reflux disease)     530.81/K21.9  · Change in bowel habits     787.99/R19.4  · Pre-op testing     V72.84/Z01.818    Problems Reconciled  Plan  · Orders  · Consent for Colonoscopy Screening-Possible risk/complications, benefits, and alternatives to surgical or invasive procedure have been explained to patient and/or legal guardian. -Patient has been evaluated and can tolerate anesthesia and/or sedation. Risks, benefits, and alternatives to anesthesia and sedation have been explained to patient and/or legal guardian. () - 787.99/R19.4, V16.0/Z80.0 - 07/15/2021  · Consent for Esophagogastroduodenoscopy (EGD) with dilation - Possible risks/complications, benefits, and alternatives to surgical or invasive procedure have been explained to patient and/or legal guardian. - Patient has been evaluated and can tolerate anesthesia and/or sedation. Risks, benefits, and alternatives to anesthesia and sedation have been explained to patient and/or legal guardian. (56826) - 787.20/R13.10, 530.81/K21.9, 789.06/R10.13 - 07/15/2021  · MG Pre-Op Covid-19 Screening (81777) - V72.84/Z01.818 - 07/09/2021   [address]  · Medications  · Medications have been Reconciled  · Transition of Care or Provider Policy  · Instructions  · Surgical Facility[facility]  · Handouts Provided Pre-Procedure Instructions including date, time, and location of procedure.   · PLAN: Proceeed with colonoscopy. Patient understands risks/benefits and is willing to proceed.   · PLAN: Proceeed with EGD. Patient understands risks/benefits and is willing to proceed.   · Surgical Orders  · RISK AND BENEFITS:  · Given these options, the patient has verbally expressed an understanding of the risks of the surgery and finds these risks acceptable. Will proceed with surgery as soon as possible.  · O.R. PREP: Per protocol   · IV: Per Anesthesia  · Please sign permit for: Colonoscopy with possible  biopsies by Dr. Story.  · Please sign permit for: Esophagogastroduodenoscopy with possible biopsies and dilation by Dr. Story.   · The above History and Physical Examination has been completed within 30 days of admission.  · Patient Status  · Outpatient  · Follow up in the in the office post procedure.  · Electronically Identified Patient Education Materials Provided Electronically  · Disposition  · Call or Return if symptoms worsen or persist.  · EMR dragon/transcription disclaimer: Much of this encounter note is an electronic transcription/translation of spoken language to printed text. Electronic translation of spoken language may permit erroneous, or at times nonsensical words or phrases to be inadvertently trasncribed; although I have reviewed the note for such errors, some may still exist.       no changes

## 2021-07-19 ENCOUNTER — TELEPHONE (OUTPATIENT)
Dept: FAMILY MEDICINE CLINIC | Facility: CLINIC | Age: 61
End: 2021-07-19

## 2021-07-19 ENCOUNTER — HOSPITAL ENCOUNTER (OUTPATIENT)
Dept: BONE DENSITY | Facility: HOSPITAL | Age: 61
Discharge: HOME | End: 2021-07-19
Admitting: NURSE PRACTITIONER

## 2021-07-19 DIAGNOSIS — Z78.0 POST-MENOPAUSE: ICD-10-CM

## 2021-07-19 PROCEDURE — 77080 DXA BONE DENSITY AXIAL: CPT

## 2021-07-19 NOTE — TELEPHONE ENCOUNTER
----- Message from MADELYN Mendiola sent at 7/19/2021 11:14 AM EDT -----  Normal spine , osteopenia in femoral neck repeat in 2 years

## 2021-07-20 LAB
CYTO UR: NORMAL
LAB AP CASE REPORT: NORMAL
LAB AP CLINICAL INFORMATION: NORMAL
LAB AP SPECIAL STAINS: NORMAL
PATH REPORT.FINAL DX SPEC: NORMAL
PATH REPORT.GROSS SPEC: NORMAL

## 2021-07-23 ENCOUNTER — APPOINTMENT (OUTPATIENT)
Dept: MRI IMAGING | Facility: HOSPITAL | Age: 61
End: 2021-07-23

## 2021-07-26 ENCOUNTER — HOSPITAL ENCOUNTER (OUTPATIENT)
Dept: MRI IMAGING | Facility: HOSPITAL | Age: 61
Discharge: HOME | End: 2021-07-26

## 2021-07-26 ENCOUNTER — OFFICE VISIT (OUTPATIENT)
Dept: ORTHOPEDIC SURGERY | Facility: CLINIC | Age: 61
End: 2021-07-26

## 2021-07-26 VITALS — OXYGEN SATURATION: 98 % | HEIGHT: 64 IN | HEART RATE: 74 BPM | BODY MASS INDEX: 23.32 KG/M2 | WEIGHT: 136.6 LBS

## 2021-07-26 DIAGNOSIS — M25.552 CHRONIC LEFT HIP PAIN: ICD-10-CM

## 2021-07-26 DIAGNOSIS — M54.50 LOW BACK PAIN, UNSPECIFIED BACK PAIN LATERALITY, UNSPECIFIED CHRONICITY, UNSPECIFIED WHETHER SCIATICA PRESENT: ICD-10-CM

## 2021-07-26 DIAGNOSIS — G89.29 CHRONIC LEFT HIP PAIN: ICD-10-CM

## 2021-07-26 DIAGNOSIS — M25.552 LEFT HIP PAIN: Primary | ICD-10-CM

## 2021-07-26 DIAGNOSIS — M25.559 HIP PAIN: ICD-10-CM

## 2021-07-26 PROCEDURE — 99213 OFFICE O/P EST LOW 20 MIN: CPT | Performed by: ORTHOPAEDIC SURGERY

## 2021-07-26 PROCEDURE — 73721 MRI JNT OF LWR EXTRE W/O DYE: CPT

## 2021-07-26 RX ORDER — METHYLPREDNISOLONE 4 MG/1
1 TABLET ORAL DAILY
Qty: 21 TABLET | Refills: 0 | Status: SHIPPED | OUTPATIENT
Start: 2021-07-26 | End: 2021-10-04

## 2021-07-26 NOTE — PROGRESS NOTES
"Chief Complaint  Pain of the Left Hip     Rey Rinaldi presents to [facility] for an evaluation of left hip. She states that she has a history of arthritis. She states 6 weeks ago she started having significant pain in her groin that radiates to her lateral hip and down her leg. She states she feels numbness and burning sensation running down her leg. She has a history of low back pain and receives steroid in her back that has provided relief. She states her back doesn't hurt her right now.     Allergies   Allergen Reactions   • Hydrocodone-Acetaminophen Shortness Of Breath   • Tramadol Shortness Of Breath   • Azithromycin Unknown - Low Severity   • Latex Unknown - Low Severity        Social History     Socioeconomic History   • Marital status:      Spouse name: Not on file   • Number of children: Not on file   • Years of education: Not on file   • Highest education level: Not on file   Tobacco Use   • Smoking status: Never Smoker   • Smokeless tobacco: Never Used   Substance and Sexual Activity   • Alcohol use: Never   • Drug use: Never        Review of Systems     Objective   Vital Signs:   Pulse 74   Ht 162.6 cm (64\")   Wt 62 kg (136 lb 9.6 oz)   SpO2 98%   BMI 23.45 kg/m²       Physical Exam  Constitutional:       Appearance: Normal appearance. He is well-developed and normal weight.   HENT:      Head: Normocephalic.      Right Ear: Hearing and external ear normal.      Left Ear: Hearing and external ear normal.      Nose: Nose normal.   Eyes:      Conjunctiva/sclera: Conjunctivae normal.   Cardiovascular:      Rate and Rhythm: Normal rate.   Pulmonary:      Effort: Pulmonary effort is normal.      Breath sounds: No wheezing or rales.   Abdominal:      Palpations: Abdomen is soft.      Tenderness: There is no abdominal tenderness.   Musculoskeletal:      Cervical back: Normal range of motion.   Skin:     Findings: No rash.   Neurological:      Mental " Status: He is alert and oriented to person, place, and time.   Psychiatric:         Mood and Affect: Mood and affect normal.         Judgment: Judgment normal.       Ortho Exam      LEFT HIP: Full hip range of motion. Good tone of hip flexors, hip extensors, hip adductor, hip abductors. Good strength to hamstrings, quadriceps, dorsiflexors and plantar flexors. Sensation grossly intact. Neurovascular intact. Dorsal Pedal Pulse 2+, posteriror tibialis pulse 2+. Calf supple, non-tender. No swelling, skin discoloration or atrophy. Tender lower back. Full weight bearing. Non-antalgic gait. Non-tender hip and pelvic muscles.     Procedures        Imaging Results (Most Recent)     Procedure Component Value Units Date/Time    XR Hip With or Without Pelvis 2 - 3 View Left [[id number]] Resulted: 07/26/21 1516     Updated: 07/26/21 1516           Result Review :       DEXA Bone Density Axial    Result Date: 7/19/2021  Narrative: PROCEDURE: DEXA BONE DENSITY AXIAL  COMPARISON: [facility], OS, BONE DENSITY SCAN => 1 SITE, 7/17/2019, 14:23.  TECHNIQUE: Lumbar vertebral and proximal femoral dual-energy X-ray absorptiometry (DXA) was performed on a [employer] device.  INDICATIONS: Post menopause  FINDINGS: The bone mineral density measured in the lumbar spine is 1.353 gm/cm2, corresponding to a T-score of 1.4.   The bone mineral density measured at the hip is 0.703 gm/cm2, corresponding to a T-score of -2.4.    This patient is considered normal in the lumbar spine, osteopenic in the femoral neck according to World Health Organization criteria.   When compared to the previous examination dated 7/17/2018, the bone mineral density of the hips has worsened by 3.3 percent. The bone mineral density of the spine has worsened by 0.5 percent.   FRAX analysis suggested that the 10 year risk of a major osteoporotic fracture to be 10.5 percent and a hip fracture of 2 percent.                                 CONCLUSION:  1.  Lumbar Spine:   Normal 2. Femoral Neck:    Osteopenia     ANTONIETTA SHABAZZ MD       Electronically Signed and Approved By: ANTONIETTA SHABAZZ MD on 7/19/2021 at 11:08                   X-Ray Report:  Left hip(s) X-Ray  Indication: Evaluation of left hip  AP and Lateral view(s)  Findings: Mild degenerative changes of the left hip. No fracture or dislocation.   Prior studies available for comparison: no     Assessment and Plan     DX: Left hip pain  Low back pain    Discussed treatment plans and diagnosis with the patient. We will obtain an MRI of her lower back and left hip. She has a left hip Mri schedule for Friday. She was prescribed a steroid pack.     Call or return if worsening symptoms.    Follow Up     Follow-up after MRI.       Patient was given instructions and counseling regarding her condition or for health maintenance advice. Please see specific information pulled into the AVS if appropriate.     Scribed for Valentino Styles MD by Roz Tyler.  07/26/21   15:19 EDT    I have personally performed the services described in this document as scribed by the above individual and it is both accurate and complete. Valentino Styles MD 07/28/21

## 2021-07-30 ENCOUNTER — HOSPITAL ENCOUNTER (OUTPATIENT)
Dept: MRI IMAGING | Facility: HOSPITAL | Age: 61
Discharge: HOME | End: 2021-07-30

## 2021-07-30 ENCOUNTER — HOSPITAL ENCOUNTER (OUTPATIENT)
Dept: GENERAL RADIOLOGY | Facility: HOSPITAL | Age: 61
Discharge: HOME | End: 2021-07-30

## 2021-07-30 ENCOUNTER — TELEPHONE (OUTPATIENT)
Dept: FAMILY MEDICINE CLINIC | Facility: CLINIC | Age: 61
End: 2021-07-30

## 2021-07-30 DIAGNOSIS — M54.50 LOW BACK PAIN, UNSPECIFIED BACK PAIN LATERALITY, UNSPECIFIED CHRONICITY, UNSPECIFIED WHETHER SCIATICA PRESENT: ICD-10-CM

## 2021-07-30 DIAGNOSIS — M25.559 HIP PAIN: Primary | ICD-10-CM

## 2021-07-30 DIAGNOSIS — M25.552 LEFT HIP PAIN: ICD-10-CM

## 2021-07-30 DIAGNOSIS — M25.559 HIP PAIN: ICD-10-CM

## 2021-07-30 PROCEDURE — 72170 X-RAY EXAM OF PELVIS: CPT

## 2021-07-30 NOTE — TELEPHONE ENCOUNTER
Radiology called, pt is there now for her MRI, she has clips in her pelvis and they have to have a picture of clearance for the MRI before they can preform to know the location.

## 2021-08-03 ENCOUNTER — OFFICE VISIT (OUTPATIENT)
Dept: SURGERY | Facility: CLINIC | Age: 61
End: 2021-08-03

## 2021-08-03 VITALS — HEIGHT: 64 IN | WEIGHT: 134.4 LBS | BODY MASS INDEX: 22.94 KG/M2

## 2021-08-03 DIAGNOSIS — D12.6 ADENOMATOUS POLYP OF COLON, UNSPECIFIED PART OF COLON: Primary | ICD-10-CM

## 2021-08-03 PROBLEM — K63.5 COLON POLYPS: Status: ACTIVE | Noted: 2021-08-03

## 2021-08-03 PROCEDURE — 99212 OFFICE O/P EST SF 10 MIN: CPT | Performed by: SURGERY

## 2021-08-03 NOTE — PROGRESS NOTES
Chief Complaint: Post-op Follow-up (2wk colonoscopy and EGD)    Subjective         History of Present Illness  Panda LEON Range is a 60 y.o. female presents to [facility] to be seen for follow-up after EGD and colonoscopy.  Patient is done well after her procedures.  Is not reporting new or any unexpected symptoms.  Eating drinking normally having regular bowel movements..    Objective     Past Medical History:   Diagnosis Date   • Abnormal bone density screening 10/15/2014    osteoporosis- tried fosamax in the past   • Acid reflux    • Arthritis 07/15/2021    left hip pain. risk for falls.    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    • COLONOSCOPY N/A 7/15/2021    Procedure: COLONOSCOPY with polypectomy/snare;  Surgeon: Adelfo Story MD;  Location: [facility];  Service: General;  Laterality: N/A;  colon polyps   • ENDOSCOPY N/A 7/15/2021    Procedure: ESOPHAGOGASTRODUODENOSCOPY with biopsies;  Surgeon: Adelfo Story MD;  Location: [facility] END[facility];  Service: General;  Laterality: N/A;  gastric nodule   • SHOULDER SURGERY Right 2014    [facility]          Current Outpatient Medications:   •  Acetaminophen 325 MG capsule, acetaminophen 325 mg oral capsule take 2 capsules by oral route daily   Active, Disp: , Rfl:   •  atorvastatin (LIPITOR) 20 MG tablet, Take 1 tablet by mouth Every Night., Disp: 90 tablet, Rfl: 1  •  cetirizine (zyrTEC) 10 MG tablet, Take 1 tablet by mouth Daily., Disp: 90 tablet, Rfl: 1  •  ibuprofen (ADVIL,MOTRIN) 800 MG tablet, Take 1 tablet by mouth Every 6 (Six) Hours As Needed for Mild Pain ., Disp: 90 tablet, Rfl: 1  •  methylPREDNISolone (MEDROL) 4 MG dose pack, Take 1 tablet by mouth Daily. Use as directed by package instructions, Disp: 21 tablet, Rfl: 0  •  metoprolol  succinate XL (TOPROL-XL) 25 MG 24 hr tablet, Take 25 mg by mouth Daily., Disp: , Rfl:   •  multivitamin with minerals (MULTIVITAMIN ADULTS 50+ PO), Take 1 tablet by mouth Daily., Disp: , Rfl:     Allergies   Allergen Reactions   • Hydrocodone-Acetaminophen Shortness Of Breath   • Tramadol Shortness Of Breath   • Azithromycin Unknown - Low Severity   • Latex Unknown - Low Severity        Family History   Problem Relation Age of Onset   • Heart attack Mother    • Colon cancer Mother    • Heart disease Father    • Breast cancer Sister    • Stroke Other    • Breast cancer Other    • Malig Hyperthermia Neg Hx        Social History     Socioeconomic History   • Marital status:      Spouse name: Not on file   • Number of children: Not on file   • Years of education: Not on file   • Highest education level: Not on file   Tobacco Use   • Smoking status: Never Smoker   • Smokeless tobacco: Never Used   Vaping Use   • Vaping Use: Never used   Substance and Sexual Activity   • Alcohol use: Never   • Drug use: Never        Physical Exam  Constitutional:       Appearance: Normal appearance.   Cardiovascular:      Rate and Rhythm: Normal rate.   Pulmonary:      Effort: Pulmonary effort is normal.   Abdominal:      Palpations: Abdomen is soft.   Skin:     General: Skin is warm.        .    Result Review :               Assessment and Plan    Diagnoses and all orders for this visit:    1. Adenomatous polyp of colon, unspecified part of colon (Primary)        Follow Up   No follow-ups on file.  Patient was given instructions and counseling regarding her condition or for health maintenance advice. Please see specific information pulled into the AVS if appropriate.     Patient is done well after her procedures.  In regards to her EGD, she did have some areas of punctate bleeding which I was concerned for preulcerous findings.  But her pathology and histology indicate no signs of ulceration and malignancy just some gastropathy.   She does have some findings of mild inflammation of the GE junction.  Patient reports she does take a lot of NSAIDs for back and hip pain and I think this may explain her findings.  I have recommended decreasing the NSAIDs as much as possible and continuing antacid medications.  I do not think she needs routine follow-up on the EGD but if she continues to have symptoms may require another EGD in the future.    In regards to the colonoscopy patient had multiple large tubular adenomas.  She does not have any signs of dysplasia or advanced polyps, but given the fact that she had multiple large polyps I would recommend a short-term interval colonoscopy in the next 1 to 3 years.  At this point we have settled that she might try it in 2 years and we will send a reminder at that time.    Discussed with the patient - all questions were answered they voiced understanding and agreed to proceed with above plan

## 2021-08-11 ENCOUNTER — TELEPHONE (OUTPATIENT)
Dept: FAMILY MEDICINE CLINIC | Facility: CLINIC | Age: 61
End: 2021-08-11

## 2021-08-11 ENCOUNTER — HOSPITAL ENCOUNTER (OUTPATIENT)
Dept: MRI IMAGING | Facility: HOSPITAL | Age: 61
Discharge: HOME | End: 2021-08-11

## 2021-08-11 ENCOUNTER — HOSPITAL ENCOUNTER (OUTPATIENT)
Dept: GENERAL RADIOLOGY | Facility: HOSPITAL | Age: 61
Discharge: HOME | End: 2021-08-11

## 2021-08-11 DIAGNOSIS — Z13.89 ENCOUNTER FOR IMAGING TO SCREEN FOR METAL PRIOR TO MRI: ICD-10-CM

## 2021-08-11 DIAGNOSIS — M16.0 PRIMARY OSTEOARTHRITIS OF BOTH HIPS: Primary | ICD-10-CM

## 2021-08-11 PROCEDURE — 72148 MRI LUMBAR SPINE W/O DYE: CPT

## 2021-08-11 PROCEDURE — 72170 X-RAY EXAM OF PELVIS: CPT

## 2021-08-11 PROCEDURE — 73721 MRI JNT OF LWR EXTRE W/O DYE: CPT

## 2021-08-20 ENCOUNTER — OFFICE VISIT (OUTPATIENT)
Dept: ORTHOPEDIC SURGERY | Facility: CLINIC | Age: 61
End: 2021-08-20

## 2021-08-20 VITALS — OXYGEN SATURATION: 96 % | BODY MASS INDEX: 23.05 KG/M2 | HEART RATE: 88 BPM | WEIGHT: 135 LBS | HEIGHT: 64 IN

## 2021-08-20 DIAGNOSIS — M76.892 HIP FLEXOR TENDINITIS, LEFT: ICD-10-CM

## 2021-08-20 DIAGNOSIS — M16.12 OSTEOARTHRITIS OF LEFT HIP, UNSPECIFIED OSTEOARTHRITIS TYPE: ICD-10-CM

## 2021-08-20 DIAGNOSIS — M50.20 PROTRUSION OF CERVICAL INTERVERTEBRAL DISC: Primary | ICD-10-CM

## 2021-08-20 PROCEDURE — 99213 OFFICE O/P EST LOW 20 MIN: CPT | Performed by: ORTHOPAEDIC SURGERY

## 2021-08-20 PROCEDURE — 20610 DRAIN/INJ JOINT/BURSA W/O US: CPT | Performed by: ORTHOPAEDIC SURGERY

## 2021-08-20 RX ORDER — LIDOCAINE HYDROCHLORIDE 10 MG/ML
9 INJECTION, SOLUTION INFILTRATION; PERINEURAL
Status: COMPLETED | OUTPATIENT
Start: 2021-08-20 | End: 2021-08-20

## 2021-08-20 RX ORDER — METHYLPREDNISOLONE ACETATE 80 MG/ML
80 INJECTION, SUSPENSION INTRA-ARTICULAR; INTRALESIONAL; INTRAMUSCULAR; SOFT TISSUE
Status: COMPLETED | OUTPATIENT
Start: 2021-08-20 | End: 2021-08-20

## 2021-08-20 RX ADMIN — LIDOCAINE HYDROCHLORIDE 9 ML: 10 INJECTION, SOLUTION INFILTRATION; PERINEURAL at 14:45

## 2021-08-20 RX ADMIN — METHYLPREDNISOLONE ACETATE 80 MG: 80 INJECTION, SUSPENSION INTRA-ARTICULAR; INTRALESIONAL; INTRAMUSCULAR; SOFT TISSUE at 14:45

## 2021-08-20 NOTE — PROGRESS NOTES
"Chief Complaint  Follow-up of the Left Hip     Subjective      Panda Rinaldi presents to [facility] for a follow-up of left hip. To review, patient states that she has a history of arthritis. She states 6 weeks ago she started having significant pain in her groin that radiates to her lateral hip and down her leg. She states she feels numbness and burning sensation running down her leg. She has a history of low back pain and receives steroid in her back that has provided relief. Patient is present today with MRI results of the back and hip. She states that the pain has been improving since we have last seen her.     Allergies   Allergen Reactions   • Hydrocodone-Acetaminophen Shortness Of Breath   • Tramadol Shortness Of Breath   • Azithromycin Unknown - Low Severity   • Latex Unknown - Low Severity        Social History     Socioeconomic History   • Marital status:      Spouse name: Not on file   • Number of children: Not on file   • Years of education: Not on file   • Highest education level: Not on file   Tobacco Use   • Smoking status: Never Smoker   • Smokeless tobacco: Never Used   Vaping Use   • Vaping Use: Never used   Substance and Sexual Activity   • Alcohol use: Never   • Drug use: Never        Review of Systems     Objective   Vital Signs:   Pulse 88   Ht 162.6 cm (64\")   Wt 61.2 kg (135 lb)   SpO2 96%   BMI 23.17 kg/m²       Physical Exam  Constitutional:       Appearance: Normal appearance. He is well-developed and normal weight.   HENT:      Head: Normocephalic.      Right Ear: Hearing and external ear normal.      Left Ear: Hearing and external ear normal.      Nose: Nose normal.   Eyes:      Conjunctiva/sclera: Conjunctivae normal.   Cardiovascular:      Rate and Rhythm: Normal rate.   Pulmonary:      Effort: Pulmonary effort is normal.      Breath sounds: No wheezing or rales.   Abdominal:      Palpations: Abdomen is soft.      Tenderness: There is no abdominal " tenderness.   Musculoskeletal:      Cervical back: Normal range of motion.   Skin:     Findings: No rash.   Neurological:      Mental Status: He is alert and oriented to person, place, and time.   Psychiatric:         Mood and Affect: Mood and affect normal.         Judgment: Judgment normal.       Ortho Exam      LEFT HIP: Dorsal Pedal Pulse 2+, posterior tibialis pulse 2+. Calf supple, non-tender. No swelling, skin discoloration or atrophy. Tender lower back. Full weight bearing. Non-antalgic gait. Non-tender hip and pelvic muscles. Full hip range of motion. Good tone of hip flexors, hip extensors, hip adductor, hip abductors. Good strength to hamstrings, quadriceps, dorsiflexors and plantar flexors. Sensation grossly intact. Neurovascular intact.       Large Joint Arthrocentesis: L greater trochanteric bursa  Date/Time: 8/20/2021 2:45 PM  Consent given by: patient  Site marked: site marked  Timeout: Immediately prior to procedure a time out was called to verify the correct patient, procedure, equipment, support staff and site/side marked as required   Supporting Documentation  Indications: pain   Procedure Details  Location: hip - L greater trochanteric bursa  Needle size: 22 G  Medications administered: 9 mL lidocaine 1 %; 80 mg methylPREDNISolone acetate 80 MG/ML  Patient tolerance: patient tolerated the procedure well with no immediate complications              Imaging Results (Most Recent)     None           Result Review :       MRI Lumbar Spine Without Contrast    Result Date: 8/11/2021  Narrative: PROCEDURE: MRI LUMBAR SPINE WO CONTRAST  COMPARISON: None  INDICATIONS: Left hip pain  TECHNIQUE: A variety of imaging planes and parameters were utilized for visualization of suspected pathology.  FINDINGS:  PARASPINAL AREA: There are suggested cysts within the lower pole of the right kidney.  The largest measures 1.15 cm. BONES: No fracture, pars defect, or osseous lesion.  CORD/CAUDA EQUINA: The conus is  around the level of T12- L1.  LUMBAR DISC LEVELS: L1-L2: There is bulging of the annulus.  There is facet arthropathy.  There is mild narrowing of the intervertebral foramina. L2-L3: There is bulging of the annulus.  There is facet arthropathy and ligamentum flavum redundancy resulting in mild to moderate narrowing of the central canal.  There is some narrowing of both intervertebral foramina. L3-L4: There is broad-based bulging of the annulus with more of a small central disc protrusion left of central.  There is facet arthropathy and ligamentum flavum redundancy resulting in moderate narrowing of the central canal.  There is narrowing of both intervertebral foramina to some degree. L4-L5: There is broad-based bulging of the annulus which does appear to extend asymmetrically laterally to the left and probably is superimposed on more of a marginal osteophyte.  There is moderate narrowing of the left intervertebral foramina and mild to moderate narrowing of the right intervertebral foramina.  There is moderate to severe narrowing of the central canal.  There is facet arthropathy and ligamentum flavum redundancy. L5-S1: There is broad-based bulging of the annulus with an underlying annular fissure.  There is a slight retrolisthesis of L5 on S1.  There is facet arthropathy.  There is some suggested narrowing of both intervertebral foramina.  CONCLUSION:  1. There are multilevel degenerative changes as described.  The findings seem most pronounced at the L4-5 level.      CRESENCIO MCKINNEY MD       Electronically Signed and Approved By: CRESENCIO MCKINNEY MD on 8/11/2021 at 12:36             XR Pelvis 1 or 2 View    Result Date: 8/11/2021  Narrative: PROCEDURE: XR PELVIS 1 OR 2 VW  COMPARISON: [facility], CR, XR PELVIS 1 OR 2 VW, 7/30/2021, 10:34.  INDICATIONS: MRI CLEARANCE  FINDINGS:  Previously demonstrated metallic clip in the right hemipelvis is no longer seen.  Moderate to moderately large colonic stool burden.   Degenerative changes in the lower lumbar spine.  No acute bone finding.  CONCLUSION: The metallic clip in the right hemipelvis on the previous study is no longer seen.      CEASAR VILLANUEVA MD       Electronically Signed and Approved By: CEASAR VILLANUEVA MD on 8/11/2021 at 11:36             XR Pelvis 1 or 2 View    Result Date: 7/30/2021  Narrative: PROCEDURE: XR PELVIS 1 OR 2 VW  COMPARISON: [facility], CR, LEFT HIP/PELVIS, 4/25/2019, 9:48.  [facility], MR, MRI HIP LEFT WO CONTRAST, 7/26/2021, 8:40.  [facility] , CR, XR HIP W OR WO PELVIS 2-3 VIEW LEFT, 7/26/2021, 15:22.  INDICATIONS: clearance for MRI, resolution clip placed x 2 weeks ago  FINDINGS:   There is a persistent endoscopy clip in the right lower quadrant.  This caused significant artifact in the pelvis on the MRI localizer images performed 7/26/2021.  Suggest attempting the MRI examination in a week if the clip as past.  No fractures or acute osseous abnormalities are identified on today's examination.  SANTHOSH ZHAO MD       Electronically Signed and Approved By: SANTHOSH ZHAO MD on 7/30/2021 at 11:06             MRI Hip Left Without Contrast    Result Date: 8/11/2021  Narrative: PROCEDURE: MRI HIP LEFT WO CONTRAST  COMPARISON:  [facility], MR, MRI LUMBAR SPINE WO CONTRAST, 8/11/2021, 11:35.  [facility], CR, XR PELVIS 1 OR 2 VW, 8/11/2021, 11:04.  [facility] , CR, XR HIP W OR WO PELVIS 2-3 VIEW LEFT, 7/26/2021, 15:22.  [facility], CR, XR PELVIS 1 OR 2 VW, 7/30/2021, 10:34. INDICATIONS: Hip pain, chronic, arthritis or infection suspected, xray done  TECHNIQUE: A comprehensive examination was performed utilizing a variety of imaging planes and imaging parameters to optimize visualization of suspected pathology.  Images were performed without contrast.  FINDINGS:  No evidence of fracture.  No definite suspicious marrow signal abnormality is seen.  No findings of  avascular necrosis.  There is mild bilateral hip osteophyte formation.  There does not appear to be significant asymmetric hip or sacroiliac joint fluid.  There appears to be mild bilateral hip joint space narrowing.  There is suspected partial thickness cartilage loss of the left hip.  The ligamentum teres appears intact.  There is degeneration and tear of the anterior-superior labrum.  No significant paralabral cyst is seen.  No definite intra-articular body is identified.  No definite findings of bursitis.  There is suspected gluteal tendinopathy at the left greater trochanter.  No definite high-grade tendon tear or advanced tendinopathy is seen.  No significant muscle edema is seen.  There is mild diffuse muscle atrophy.  There is some susceptibility artifact in the right pelvis, presumed to be related to prior surgery.  No significant periarticular edema, effusion, or definite erosive changes are seen at the sacroiliac joints.  There is mild sacroiliac osteophyte formation suggesting mild osteoarthritis.  Please see separate report for evaluation of the lumbar spine.  CONCLUSION:  1. Mild bilateral hip osteoarthritis. 2. No definitive findings to suggest joint infection on this exam, but if there is high index of clinical concern, aspiration would be recommended. 3. Chronic degeneration and tear of the anterior-superior left labrum. 4. Gluteal tendinopathy without definite high-grade tendon tear.  Mild degenerative changes at the sacroiliac joint.     GWEN ESTRADA MD       Electronically Signed and Approved By: GWEN ESTRADA MD on 8/11/2021 at 12:38             MRI Hip Left Without Contrast    Result Date: 8/11/2021  Narrative: PROCEDURE: MRI HIP LEFT WO CONTRAST  COMPARISON:  [facility], MR, MRI LUMBAR SPINE WO CONTRAST, 8/11/2021, 11:35.  [facility], CR, XR PELVIS 1 OR 2 VW, 8/11/2021, 11:04.  [facility] , CR, XR HIP W OR WO PELVIS 2-3 VIEW LEFT, 7/26/2021, 15:22.  [facility], CR, XR PELVIS 1 OR 2 VW, 7/30/2021, 10:34. INDICATIONS: Hip pain, chronic, arthritis or infection suspected, xray done  TECHNIQUE: A comprehensive examination was performed utilizing a variety of imaging planes and imaging parameters to optimize visualization of suspected pathology.  Images were performed without contrast.  FINDINGS:  No evidence of fracture.  No definite suspicious marrow signal abnormality is seen.  No findings of avascular necrosis.  There is mild bilateral hip osteophyte formation.  There does not appear to be significant asymmetric hip or sacroiliac joint fluid.  There appears to be mild bilateral hip joint space narrowing.  There is suspected partial thickness cartilage loss of the left hip.  The ligamentum teres appears intact.  There is degeneration and tear of the anterior-superior labrum.  No significant paralabral cyst is seen.  No definite intra-articular body is identified.  No definite findings of bursitis.  There is suspected gluteal tendinopathy at the left greater trochanter.  No definite high-grade tendon tear or advanced tendinopathy is seen.  No significant muscle edema is seen.  There is mild diffuse muscle atrophy.  There is some susceptibility artifact in the right pelvis, presumed to be related to prior surgery.  No significant periarticular edema, effusion, or definite erosive changes are seen at the sacroiliac joints.  There is mild sacroiliac osteophyte formation suggesting mild osteoarthritis.  Please see separate report for evaluation of the lumbar spine.  CONCLUSION:  1. Mild bilateral hip osteoarthritis. 2. No definitive findings to suggest joint infection on this exam, but if there is high index of clinical concern, aspiration would be recommended. 3. Chronic degeneration and tear of the anterior-superior left labrum. 4. Gluteal tendinopathy without definite high-grade tendon tear.  Mild degenerative changes at the sacroiliac joint.     GWEN  MD SEAN       Electronically Signed and Approved By: GWEN ESTRADA MD on 8/11/2021 at 12:38             XR Hip With or Without Pelvis 2 - 3 View Left    Result Date: 7/29/2021  Narrative: X-Ray Report: Left hip(s) X-Ray Indication: Evaluation of left hip AP and Lateral view(s) Findings: Prior studies available for comparison: no              Assessment and Plan     DX: L4-5 radiculopathy   Hip flexor tendinitis  Mild left hip osteoarthritis     Discussed treatment plans and diagnosis with the patient. She states pain has been resolving and she has little pain. Patient may call us to get referred to pain and spine for L4-5 radiculopathy. She was given a left hip injection and tolerated this procedure.     Call or return if worsening symptoms.    Follow Up     PRN.       Patient was given instructions and counseling regarding her condition or for health maintenance advice. Please see specific information pulled into the AVS if appropriate.     Scribed for Valentino Styles MD by Roz Tyler.  08/20/21   14:09 EDT    I have personally performed the services described in this document as scribed by the above individual and it is both accurate and complete. Valentino Styles MD 08/20/21

## 2021-10-04 ENCOUNTER — OFFICE VISIT (OUTPATIENT)
Dept: FAMILY MEDICINE CLINIC | Facility: CLINIC | Age: 61
End: 2021-10-04

## 2021-10-04 VITALS
TEMPERATURE: 97.5 F | WEIGHT: 138.2 LBS | BODY MASS INDEX: 24.49 KG/M2 | HEIGHT: 63 IN | DIASTOLIC BLOOD PRESSURE: 65 MMHG | SYSTOLIC BLOOD PRESSURE: 114 MMHG | HEART RATE: 65 BPM

## 2021-10-04 DIAGNOSIS — E78.5 HYPERLIPIDEMIA, UNSPECIFIED HYPERLIPIDEMIA TYPE: ICD-10-CM

## 2021-10-04 DIAGNOSIS — R31.9 URINARY TRACT INFECTION WITH HEMATURIA, SITE UNSPECIFIED: ICD-10-CM

## 2021-10-04 DIAGNOSIS — R39.15 URINARY URGENCY: ICD-10-CM

## 2021-10-04 DIAGNOSIS — R10.30 LOWER ABDOMINAL PAIN: ICD-10-CM

## 2021-10-04 DIAGNOSIS — N39.0 URINARY TRACT INFECTION WITH HEMATURIA, SITE UNSPECIFIED: ICD-10-CM

## 2021-10-04 DIAGNOSIS — R00.2 PALPITATIONS: ICD-10-CM

## 2021-10-04 DIAGNOSIS — R30.9 PAINFUL URINATION: Primary | ICD-10-CM

## 2021-10-04 LAB
BILIRUB BLD-MCNC: NEGATIVE MG/DL
CLARITY, POC: CLEAR
COLOR UR: YELLOW
GLUCOSE UR STRIP-MCNC: NEGATIVE MG/DL
KETONES UR QL: NEGATIVE
LEUKOCYTE EST, POC: ABNORMAL
NITRITE UR-MCNC: POSITIVE MG/ML
PH UR: 7 [PH] (ref 5–8)
PROT UR STRIP-MCNC: NEGATIVE MG/DL
RBC # UR STRIP: NEGATIVE /UL
SP GR UR: 1.01 (ref 1–1.03)
UROBILINOGEN UR QL: NORMAL

## 2021-10-04 PROCEDURE — 87086 URINE CULTURE/COLONY COUNT: CPT | Performed by: NURSE PRACTITIONER

## 2021-10-04 PROCEDURE — 87186 SC STD MICRODIL/AGAR DIL: CPT | Performed by: NURSE PRACTITIONER

## 2021-10-04 PROCEDURE — 87088 URINE BACTERIA CULTURE: CPT | Performed by: NURSE PRACTITIONER

## 2021-10-04 PROCEDURE — 99214 OFFICE O/P EST MOD 30 MIN: CPT | Performed by: NURSE PRACTITIONER

## 2021-10-04 PROCEDURE — 81003 URINALYSIS AUTO W/O SCOPE: CPT | Performed by: NURSE PRACTITIONER

## 2021-10-04 RX ORDER — ATORVASTATIN CALCIUM 20 MG/1
20 TABLET, FILM COATED ORAL NIGHTLY
Qty: 90 TABLET | Refills: 0 | Status: SHIPPED | OUTPATIENT
Start: 2021-10-04 | End: 2022-01-05 | Stop reason: SDUPTHER

## 2021-10-04 RX ORDER — PHENAZOPYRIDINE HYDROCHLORIDE 200 MG/1
200 TABLET, FILM COATED ORAL 3 TIMES DAILY PRN
Qty: 15 TABLET | Refills: 0 | Status: SHIPPED | OUTPATIENT
Start: 2021-10-04 | End: 2021-12-15

## 2021-10-04 RX ORDER — NITROFURANTOIN 25; 75 MG/1; MG/1
100 CAPSULE ORAL 2 TIMES DAILY
Qty: 14 CAPSULE | Refills: 0 | Status: SHIPPED | OUTPATIENT
Start: 2021-10-04 | End: 2021-10-11

## 2021-10-04 RX ORDER — METOPROLOL SUCCINATE 25 MG/1
25 TABLET, EXTENDED RELEASE ORAL DAILY
Qty: 90 TABLET | Refills: 0 | Status: SHIPPED | OUTPATIENT
Start: 2021-10-04 | End: 2022-01-05 | Stop reason: SDUPTHER

## 2021-10-04 NOTE — PROGRESS NOTES
Chief Complaint  Abdominal Pain, Difficulty Urinating, and Fatigue    Subjective          Panda Rinaldi presents to [facility]  History of Present Illness    Patient complaining of low abdominal pain, fatigue, dysuria X 3 days.  Patient states symptoms started with low back pain on Friday.  Patient states pain is located across low abdomen, cramping.  Patient states every time she urinates she feels chills.  Patient admits to urinary incontinence, urinary urgency, urinary frequency.  Patient has tried Azo OTC with little to no relief.  Patient denies hematuria. Pt admits to bladder spasm. She states she has gotten some relief with the AZO. She does admit to urgency and frequency.     Hyperlipidemia-patient is requesting refill on Lipitor today as she recently ran out.    Palpitations-patient taking metoprolol 25 mg daily to help with heart palpitations.  She is requesting refill on this medication as well    Past Medical History:   Diagnosis Date   • Abnormal bone density screening 10/15/2014    osteoporosis- tried fosamax in the past   • Acid reflux    • Arthritis 07/15/2021    left hip pain. risk for falls.    • Heart disease    • Hyperlipemia    • Osteoporosis 10/15/2014   • Palpitations    • Seasonal allergies    • Shoulder pain          Allergies   Allergen Reactions   • Hydrocodone-Acetaminophen Shortness Of Breath   • Tramadol Shortness Of Breath   • Azithromycin Unknown - Low Severity   • Latex Unknown - Low Severity          Past Surgical History:   Procedure Laterality Date   • COLONOSCOPY  10/2015    ftRaciel sales    • COLONOSCOPY N/A 7/15/2021    Procedure: COLONOSCOPY with polypectomy/snare;  Surgeon: Adelfo Story MD;  Location: [facility] ENDOSCOPY;  Service: General;  Laterality: N/A;  colon polyps   • ENDOSCOPY N/A 7/15/2021    Procedure: ESOPHAGOGASTRODUODENOSCOPY with biopsies;  Surgeon: Adelfo Story MD;  Location: [facility] ENDOSCOPY;  Service: General;  Laterality:  "N/A;  gastric nodule   • SHOULDER SURGERY Right 2014    [facility]           Social History     Tobacco Use   • Smoking status: Never Smoker   • Smokeless tobacco: Never Used   Substance Use Topics   • Alcohol use: Never         Family History   Problem Relation Age of Onset   • Heart attack Mother    • Colon cancer Mother    • Heart disease Father    • Breast cancer Sister    • Stroke Other    • Breast cancer Other    • Malig Hyperthermia Neg Hx           Current Outpatient Medications on File Prior to Visit   Medication Sig   • Acetaminophen 325 MG capsule acetaminophen 325 mg oral capsule take 2 capsules by oral route daily   Active   • cetirizine (zyrTEC) 10 MG tablet Take 1 tablet by mouth Daily.   • ibuprofen (ADVIL,MOTRIN) 800 MG tablet Take 1 tablet by mouth Every 6 (Six) Hours As Needed for Mild Pain .   • multivitamin with minerals (MULTIVITAMIN ADULTS 50+ PO) Take 1 tablet by mouth Daily.   • [DISCONTINUED] atorvastatin (LIPITOR) 20 MG tablet Take 1 tablet by mouth Every Night.   • [DISCONTINUED] metoprolol succinate XL (TOPROL-XL) 25 MG 24 hr tablet Take 25 mg by mouth Daily.   • [DISCONTINUED] methylPREDNISolone (MEDROL) 4 MG dose pack Take 1 tablet by mouth Daily. Use as directed by package instructions     No current facility-administered medications on file prior to visit.         Immunization History   Administered Date(s) Administered   • COVID-19 (PFIZER) 03/16/2021, 04/06/2021   • Flu Vaccine Quad PF >18YRS 10/01/2020   • Influenza, Unspecified 10/01/2020   • Shingrix 05/25/2021         /65 (BP Location: Left arm, Patient Position: Sitting)   Pulse 65   Temp 97.5 °F (36.4 °C) (Oral)   Ht 160 cm (63\")   Wt 62.7 kg (138 lb 3.2 oz)   BMI 24.48 kg/m²             Physical Exam  Vitals reviewed.   Constitutional:       Appearance: Normal appearance. She is well-developed.   HENT:      Head: Normocephalic and atraumatic.      Right Ear: External ear normal.      Left Ear: External ear normal.      " Mouth/Throat:      Pharynx: No oropharyngeal exudate.   Eyes:      Conjunctiva/sclera: Conjunctivae normal.      Pupils: Pupils are equal, round, and reactive to light.   Cardiovascular:      Rate and Rhythm: Normal rate and regular rhythm.      Heart sounds: No murmur heard.   No friction rub. No gallop.    Pulmonary:      Effort: Pulmonary effort is normal.      Breath sounds: Normal breath sounds. No wheezing or rhonchi.   Abdominal:      Tenderness: There is abdominal tenderness in the suprapubic area.   Skin:     General: Skin is warm and dry.   Neurological:      Mental Status: She is alert and oriented to person, place, and time.      Cranial Nerves: No cranial nerve deficit.   Psychiatric:         Mood and Affect: Mood and affect normal.         Behavior: Behavior normal.         Thought Content: Thought content normal.         Judgment: Judgment normal.             Result Review :{Labs  Result Review  Imaging  Med Tab  Media :23}     The following data was reviewed by: MADELYN Brown on 10/04/2021:  Contains abnormal data POCT urinalysis dipstick, automated  Order: [id number]  Status:  Final result   Visible to patient:  No (not released) Next appt:  10/11/2021 at 09:30 AM in Family Medicine (MADELYN Mendiola) Dx:  Painful urination; Lower abdominal pain  Specimen Information: Urine         0 Result Notes  Component   Ref Range & Units 12:25 5 mo ago   Color   Yellow, Straw, Dark Yellow, Brea Yellow  Dark yellow R    Clarity, UA   Clear Clear  Clear R    Specific Gravity    1.005 - 1.030 1.010  1.020 R    pH, Urine   5.0 - 8.0 7.0  6.5 R    Leukocytes   Negative Small (1+)Abnormal   Negative R    Nitrite, UA   Negative PositiveAbnormal   Negative R    Protein, POC   Negative mg/dL Negative  Negative R    Glucose, UA   Negative, 1000 mg/dL (3+) mg/dL Negative  Negative R    Ketones, UA   Negative Negative  Negative R    Urobilinogen, UA   Normal Normal  0.2 E.U./dL R    Bilirubin   Negative  Negative  Negative R    Blood, UA   Negative Negative  Negative R    [facility] LABORATORY [facility]         Specimen Collected: 10/04/21 12:25 Last Resulted: 10/04/21 12:25        Lab Flowsheet     Order Details     View Encounter                               Assessment and Plan      Diagnoses and all orders for this visit:    1. Painful urination (Primary)  -     POCT urinalysis dipstick, automated  -     Urine Culture - Urine, Urine, Clean Catch    2. Lower abdominal pain  -     POCT urinalysis dipstick, automated  -     Urine Culture - Urine, Urine, Clean Catch    3. Urinary urgency  -     Urine Culture - Urine, Urine, Clean Catch    4. Palpitations  Comments:  Controlled with metoprolol, continue current medication.  Orders:  -     metoprolol succinate XL (TOPROL-XL) 25 MG 24 hr tablet; Take 1 tablet by mouth Daily.  Dispense: 90 tablet; Refill: 0    5. Hyperlipidemia, unspecified hyperlipidemia type  Comments:  Continue current medication  Orders:  -     atorvastatin (LIPITOR) 20 MG tablet; Take 1 tablet by mouth Every Night.  Dispense: 90 tablet; Refill: 0    6. Urinary tract infection with hematuria, site unspecified  -     nitrofurantoin, macrocrystal-monohydrate, (Macrobid) 100 MG capsule; Take 1 capsule by mouth 2 (Two) Times a Day.  Dispense: 14 capsule; Refill: 0  -     phenazopyridine (Pyridium) 200 MG tablet; Take 1 tablet by mouth 3 (Three) Times a Day As Needed for Bladder Spasms.  Dispense: 15 tablet; Refill: 0              Follow Up     Return if symptoms worsen or fail to improve, for Next scheduled follow up.    Patient was given instructions and counseling regarding her condition or for health maintenance advice. Please see specific information pulled into the AVS if appropriate.

## 2021-10-06 ENCOUNTER — TELEPHONE (OUTPATIENT)
Dept: FAMILY MEDICINE CLINIC | Facility: CLINIC | Age: 61
End: 2021-10-06

## 2021-10-06 LAB — BACTERIA SPEC AEROBE CULT: ABNORMAL

## 2021-10-06 RX ORDER — SULFAMETHOXAZOLE AND TRIMETHOPRIM 800; 160 MG/1; MG/1
1 TABLET ORAL 2 TIMES DAILY
Qty: 6 TABLET | Refills: 0 | Status: SHIPPED | OUTPATIENT
Start: 2021-10-06 | End: 2021-12-15

## 2021-10-06 NOTE — TELEPHONE ENCOUNTER
Caller: RASHAD FATEMEH    Relationship: Emergency Contact    Best call back number: [phone]     What medication are you requesting: FATEMEH STATES PATIENT WAS PRESCRIBED NITROFURANTOIN ON 10/4/2021 TO HELP WITH A UTI. FATEMEH STATES THE PATIENT HAD NEGATIVE REACTIONS AND IS REQUESTING A DIFFERENT ANTIBIOTIC    What are your current symptoms: FATEMEH STATES THE MEDICATION THE PATIENT WAS ALREADY GIVEN CAUSED VOMMITTING AND INCOHERENCE     How long have you been experiencing symptoms: A FEW DAYS    Have you had these symptoms before:    [x] Yes  [] No    Have you been treated for these symptoms before:   [x] Yes  [] No    If a prescription is needed, what is your preferred pharmacy and phone number: [facility] [place] [facility] -  [place], [place] - [address] [phone] [facility] [phone]      Additional notes: FATEMEH STATES HE WOULD LIKE A CALL BACK IF SOMETHING CAN BE SENT TO THE PHARMACY

## 2021-10-06 NOTE — TELEPHONE ENCOUNTER
Caller: FATEMEH SOLORZANO    Relationship to patient: Emergency Contact    Best call back number: [phone]    Patient is needing: PATIENT'S  CALLED IN TO CHECK ON THE STATUS OF WIFE'S NEW PRESCRIPTION.

## 2021-10-06 NOTE — TELEPHONE ENCOUNTER
D/c macrobid and start bactrim ds one po bid for three days. Advised pt to take with food. I have sent in new prescription. Pt is aware.

## 2021-10-07 ENCOUNTER — HOSPITAL ENCOUNTER (EMERGENCY)
Facility: HOSPITAL | Age: 61
Discharge: HOME | End: 2021-10-07
Attending: EMERGENCY MEDICINE | Admitting: EMERGENCY MEDICINE

## 2021-10-07 ENCOUNTER — TELEPHONE (OUTPATIENT)
Dept: FAMILY MEDICINE CLINIC | Facility: CLINIC | Age: 61
End: 2021-10-07

## 2021-10-07 VITALS
OXYGEN SATURATION: 98 % | HEIGHT: 65 IN | TEMPERATURE: 98.2 F | WEIGHT: 136.24 LBS | RESPIRATION RATE: 16 BRPM | SYSTOLIC BLOOD PRESSURE: 136 MMHG | BODY MASS INDEX: 22.7 KG/M2 | HEART RATE: 68 BPM | DIASTOLIC BLOOD PRESSURE: 80 MMHG

## 2021-10-07 DIAGNOSIS — R11.2 NON-INTRACTABLE VOMITING WITH NAUSEA, UNSPECIFIED VOMITING TYPE: Primary | ICD-10-CM

## 2021-10-07 DIAGNOSIS — N39.0 URINARY TRACT INFECTION WITHOUT HEMATURIA, SITE UNSPECIFIED: ICD-10-CM

## 2021-10-07 LAB
ALBUMIN SERPL-MCNC: 5.1 G/DL (ref 3.5–5.2)
ALBUMIN/GLOB SERPL: 1.6 G/DL
ALP SERPL-CCNC: 67 U/L (ref 39–117)
ALT SERPL W P-5'-P-CCNC: 24 U/L (ref 1–33)
ANION GAP SERPL CALCULATED.3IONS-SCNC: 13.9 MMOL/L (ref 5–15)
AST SERPL-CCNC: 23 U/L (ref 1–32)
BASOPHILS # BLD AUTO: 0.03 10*3/MM3 (ref 0–0.2)
BASOPHILS NFR BLD AUTO: 0.3 % (ref 0–1.5)
BILIRUB SERPL-MCNC: 0.5 MG/DL (ref 0–1.2)
BILIRUB UR QL STRIP: NEGATIVE
BUN SERPL-MCNC: 8 MG/DL (ref 8–23)
BUN/CREAT SERPL: 8.6 (ref 7–25)
CALCIUM SPEC-SCNC: 9.9 MG/DL (ref 8.6–10.5)
CHLORIDE SERPL-SCNC: 99 MMOL/L (ref 98–107)
CLARITY UR: CLEAR
CO2 SERPL-SCNC: 22.1 MMOL/L (ref 22–29)
COLOR UR: YELLOW
CREAT SERPL-MCNC: 0.93 MG/DL (ref 0.57–1)
DEPRECATED RDW RBC AUTO: 41.1 FL (ref 37–54)
EOSINOPHIL # BLD AUTO: 0.02 10*3/MM3 (ref 0–0.4)
EOSINOPHIL NFR BLD AUTO: 0.2 % (ref 0.3–6.2)
ERYTHROCYTE [DISTWIDTH] IN BLOOD BY AUTOMATED COUNT: 12.8 % (ref 12.3–15.4)
GFR SERPL CREATININE-BSD FRML MDRD: 61 ML/MIN/1.73
GFR SERPL CREATININE-BSD FRML MDRD: 74 ML/MIN/1.73
GLOBULIN UR ELPH-MCNC: 3.2 GM/DL
GLUCOSE SERPL-MCNC: 122 MG/DL (ref 65–99)
GLUCOSE UR STRIP-MCNC: NEGATIVE MG/DL
HCT VFR BLD AUTO: 40.4 % (ref 34–46.6)
HGB BLD-MCNC: 13.8 G/DL (ref 12–15.9)
HGB UR QL STRIP.AUTO: NEGATIVE
HOLD SPECIMEN: NORMAL
HOLD SPECIMEN: NORMAL
IMM GRANULOCYTES # BLD AUTO: 0.03 10*3/MM3 (ref 0–0.05)
IMM GRANULOCYTES NFR BLD AUTO: 0.3 % (ref 0–0.5)
KETONES UR QL STRIP: NEGATIVE
LEUKOCYTE ESTERASE UR QL STRIP.AUTO: NEGATIVE
LIPASE SERPL-CCNC: 32 U/L (ref 13–60)
LYMPHOCYTES # BLD AUTO: 1.79 10*3/MM3 (ref 0.7–3.1)
LYMPHOCYTES NFR BLD AUTO: 16.8 % (ref 19.6–45.3)
MCH RBC QN AUTO: 29.9 PG (ref 26.6–33)
MCHC RBC AUTO-ENTMCNC: 34.2 G/DL (ref 31.5–35.7)
MCV RBC AUTO: 87.6 FL (ref 79–97)
MONOCYTES # BLD AUTO: 0.63 10*3/MM3 (ref 0.1–0.9)
MONOCYTES NFR BLD AUTO: 5.9 % (ref 5–12)
NEUTROPHILS NFR BLD AUTO: 76.5 % (ref 42.7–76)
NEUTROPHILS NFR BLD AUTO: 8.14 10*3/MM3 (ref 1.7–7)
NITRITE UR QL STRIP: NEGATIVE
NRBC BLD AUTO-RTO: 0 /100 WBC (ref 0–0.2)
PH UR STRIP.AUTO: 7 [PH] (ref 5–8)
PLATELET # BLD AUTO: 234 10*3/MM3 (ref 140–450)
PMV BLD AUTO: 11.6 FL (ref 6–12)
POTASSIUM SERPL-SCNC: 4 MMOL/L (ref 3.5–5.2)
PROT SERPL-MCNC: 8.3 G/DL (ref 6–8.5)
PROT UR QL STRIP: NEGATIVE
RBC # BLD AUTO: 4.61 10*6/MM3 (ref 3.77–5.28)
SODIUM SERPL-SCNC: 135 MMOL/L (ref 136–145)
SP GR UR STRIP: <=1.005 (ref 1–1.03)
UROBILINOGEN UR QL STRIP: NORMAL
WBC # BLD AUTO: 10.64 10*3/MM3 (ref 3.4–10.8)
WHOLE BLOOD HOLD SPECIMEN: NORMAL
WHOLE BLOOD HOLD SPECIMEN: NORMAL

## 2021-10-07 PROCEDURE — 83690 ASSAY OF LIPASE: CPT

## 2021-10-07 PROCEDURE — 36415 COLL VENOUS BLD VENIPUNCTURE: CPT

## 2021-10-07 PROCEDURE — 96375 TX/PRO/DX INJ NEW DRUG ADDON: CPT

## 2021-10-07 PROCEDURE — 80053 COMPREHEN METABOLIC PANEL: CPT

## 2021-10-07 PROCEDURE — 81003 URINALYSIS AUTO W/O SCOPE: CPT

## 2021-10-07 PROCEDURE — 99283 EMERGENCY DEPT VISIT LOW MDM: CPT

## 2021-10-07 PROCEDURE — 96374 THER/PROPH/DIAG INJ IV PUSH: CPT

## 2021-10-07 PROCEDURE — 85025 COMPLETE CBC W/AUTO DIFF WBC: CPT

## 2021-10-07 PROCEDURE — 25010000002 ONDANSETRON PER 1 MG: Performed by: REGISTERED NURSE

## 2021-10-07 PROCEDURE — 25010000002 DROPERIDOL PER 5 MG: Performed by: REGISTERED NURSE

## 2021-10-07 RX ORDER — DROPERIDOL 2.5 MG/ML
2.5 INJECTION, SOLUTION INTRAMUSCULAR; INTRAVENOUS ONCE
Status: COMPLETED | OUTPATIENT
Start: 2021-10-07 | End: 2021-10-07

## 2021-10-07 RX ORDER — SODIUM CHLORIDE 0.9 % (FLUSH) 0.9 %
10 SYRINGE (ML) INJECTION AS NEEDED
Status: DISCONTINUED | OUTPATIENT
Start: 2021-10-07 | End: 2021-10-08 | Stop reason: HOSPADM

## 2021-10-07 RX ORDER — ONDANSETRON 2 MG/ML
4 INJECTION INTRAMUSCULAR; INTRAVENOUS ONCE
Status: COMPLETED | OUTPATIENT
Start: 2021-10-07 | End: 2021-10-07

## 2021-10-07 RX ORDER — ONDANSETRON 4 MG/1
4 TABLET, ORALLY DISINTEGRATING ORAL EVERY 8 HOURS PRN
Qty: 30 TABLET | Refills: 0 | Status: SHIPPED | OUTPATIENT
Start: 2021-10-07 | End: 2021-10-17

## 2021-10-07 RX ORDER — PROCHLORPERAZINE MALEATE 5 MG/1
5 TABLET ORAL EVERY 6 HOURS PRN
Qty: 20 TABLET | Refills: 0 | Status: SHIPPED | OUTPATIENT
Start: 2021-10-07 | End: 2021-10-12

## 2021-10-07 RX ADMIN — SODIUM CHLORIDE 1000 ML: 9 INJECTION, SOLUTION INTRAVENOUS at 19:59

## 2021-10-07 RX ADMIN — DROPERIDOL 2.5 MG: 2.5 INJECTION, SOLUTION INTRAMUSCULAR; INTRAVENOUS at 21:16

## 2021-10-07 RX ADMIN — ONDANSETRON 4 MG: 2 INJECTION INTRAMUSCULAR; INTRAVENOUS at 20:00

## 2021-10-07 NOTE — TELEPHONE ENCOUNTER
Caller: FATEMHE SOLORZANO    Relationship to patient: Emergency Contact    Best call back number: [phone]    Chief complaint: PATIENT'S  CALLED IN BECAUSE HIS WIFE WAS RECENTLY TREATED FOR A URINARY TRACT INFECTION. BEGINNING YESTERDAY 10.06.2021 HER CONDITION STARTED WORSENING AND SHE STARTED FEELING NUMBING AND TINGLING IN HER LIMBS.  WAS UNSURE TO TAKE HER TO URGENT CARE AND WANTED MEDICAL ADVICE ON WHAT TO DO NEXT. INFORMED PATIENT'S  ANY TIME THERE IS NUMBING OR TINGLING IN EXTREMEITIES TO GO TO THE EMERGENCY ROOM.  NOTIFIED ME THEY WERE ON THEIR WAY TO [facility] EMERGENCY ROOM RIGHT NOW.     Patient directed to call 911 or go to their nearest emergency room.     Patient verbalized understanding: [x] Yes  [] No  If no, why?

## 2021-10-07 NOTE — ED PROVIDER NOTES
Time: 6:36 PM EDT  Arrived by: CRISTOPHER  Chief Complaint: Nausea/vomiting  History provided by: Patient, spouse    History of Present Illness:  Patient is a 61 y.o. year old female that presents to the emergency department with complaint of nausea and vomiting.  She states she was diagnosed with UTI on Tuesday and started on antibiotics.  She states that she has been able to keep down anything since then.  She denies any pain at this time.       used: No    Vomiting  The primary symptoms include nausea and vomiting. Primary symptoms do not include fever, abdominal pain, diarrhea or dysuria. The illness began 2 days ago. The onset was sudden. The problem has been gradually worsening.   The illness does not include chills.           Similar Symptoms Previously: No  Recently seen: Yes, by Saima Jeong on 10/4/2021      Patient Care Team  Primary Care Provider: Sadie Fragoso    Past Medical History:     Allergies   Allergen Reactions   • Hydrocodone-Acetaminophen Shortness Of Breath   • Tramadol Shortness Of Breath   • Azithromycin Unknown - Low Severity   • Latex Unknown - Low Severity     Past Medical History:   Diagnosis Date   • Abnormal bone density screening 10/15/2014    osteoporosis- tried fosamax in the past   • Acid reflux    • Arthritis 07/15/2021    left hip pain. risk for falls.    • Heart disease    • Hyperlipemia    • Osteoporosis 10/15/2014   • Palpitations    • Seasonal allergies    • Shoulder pain      Past Surgical History:   Procedure Laterality Date   • COLONOSCOPY  10/2015    ftRaciel sales    • COLONOSCOPY N/A 7/15/2021    Procedure: COLONOSCOPY with polypectomy/snare;  Surgeon: Adelfo Story MD;  Location: [facility] ENDOSCOPY;  Service: General;  Laterality: N/A;  colon polyps   • ENDOSCOPY N/A 7/15/2021    Procedure: ESOPHAGOGASTRODUODENOSCOPY with biopsies;  Surgeon: Adelfo Story MD;  Location: [facility] ENDOSCOPY;  Service: General;  Laterality: N/A;  gastric nodule   • SHOULDER  SURGERY Right 2014    [facility]      Family History   Problem Relation Age of Onset   • Heart attack Mother    • Colon cancer Mother    • Heart disease Father    • Breast cancer Sister    • Stroke Other    • Breast cancer Other    • Malig Hyperthermia Neg Hx        Home Medications:  Prior to Admission medications    Medication Sig Start Date End Date Taking? Authorizing Provider   Acetaminophen 325 MG capsule acetaminophen 325 mg oral capsule take 2 capsules by oral route daily   Active    Provider, MD Ximena   atorvastatin (LIPITOR) 20 MG tablet Take 1 tablet by mouth Every Night. 10/4/21   Saima Jeong APRN   cetirizine (zyrTEC) 10 MG tablet Take 1 tablet by mouth Daily. 6/17/21   Sadie Fragoso APRN   ibuprofen (ADVIL,MOTRIN) 800 MG tablet Take 1 tablet by mouth Every 6 (Six) Hours As Needed for Mild Pain . 6/17/21   Sadie Fragoso APRN   metoprolol succinate XL (TOPROL-XL) 25 MG 24 hr tablet Take 1 tablet by mouth Daily. 10/4/21   Saima Jeong APRN   multivitamin with minerals (MULTIVITAMIN ADULTS 50+ PO) Take 1 tablet by mouth Daily.    Provider, MD Ximena   nitrofurantoin, macrocrystal-monohydrate, (Macrobid) 100 MG capsule Take 1 capsule by mouth 2 (Two) Times a Day. 10/4/21   Saima Jeong APRN   phenazopyridine (Pyridium) 200 MG tablet Take 1 tablet by mouth 3 (Three) Times a Day As Needed for Bladder Spasms. 10/4/21   Saima Jeong APRN   sulfamethoxazole-trimethoprim (Bactrim DS) 800-160 MG per tablet Take 1 tablet by mouth 2 (Two) Times a Day. 10/6/21   Saima Jeong APRN        Social History:   PT  reports that she has never smoked. She has never used smokeless tobacco. She reports that she does not drink alcohol and does not use drugs.    Record Review:  I have reviewed the patient's records in [facility].     Review of Systems  Review of Systems   Constitutional: Negative for chills and fever.   HENT: Negative for congestion, ear pain and sore throat.    Eyes: Negative for  "pain.   Respiratory: Negative for cough, chest tightness and shortness of breath.    Cardiovascular: Negative for chest pain.   Gastrointestinal: Positive for nausea and vomiting. Negative for abdominal pain and diarrhea.   Genitourinary: Negative for dysuria, flank pain and hematuria.   Musculoskeletal: Negative for joint swelling.   Skin: Negative for pallor.   Neurological: Negative for seizures and headaches.   All other systems reviewed and are negative.       Physical Exam    /82 (BP Location: Left arm, Patient Position: Sitting)   Pulse 60   Temp 98 °F (36.7 °C) (Oral)   Resp 18   Ht 165.1 cm (65\")   Wt 61.8 kg (136 lb 3.9 oz)   LMP  (LMP Unknown)   SpO2 98%   BMI 22.67 kg/m²     Physical Exam  Vitals and nursing note reviewed.   Constitutional:       General: She is not in acute distress.     Appearance: Normal appearance. She is not toxic-appearing.   HENT:      Head: Normocephalic and atraumatic.      Mouth/Throat:      Mouth: Mucous membranes are moist.   Eyes:      Extraocular Movements: Extraocular movements intact.      Pupils: Pupils are equal, round, and reactive to light.   Cardiovascular:      Rate and Rhythm: Normal rate and regular rhythm.      Pulses: Normal pulses.      Heart sounds: Normal heart sounds.   Pulmonary:      Effort: Pulmonary effort is normal. No respiratory distress.      Breath sounds: Normal breath sounds.   Abdominal:      General: Abdomen is flat. Bowel sounds are normal. There is no distension.      Palpations: Abdomen is soft.      Tenderness: There is no abdominal tenderness. There is no right CVA tenderness or left CVA tenderness.   Musculoskeletal:         General: Normal range of motion.      Cervical back: Normal range of motion and neck supple.   Skin:     General: Skin is warm and dry.   Neurological:      Mental Status: She is alert and oriented to person, place, and time. Mental status is at baseline.                  ED Course  /82 (BP " "Location: Left arm, Patient Position: Sitting)   Pulse 60   Temp 98 °F (36.7 °C) (Oral)   Resp 18   Ht 165.1 cm (65\")   Wt 61.8 kg (136 lb 3.9 oz)   LMP  (LMP Unknown)   SpO2 98%   BMI 22.67 kg/m²   Results for orders placed or performed during the hospital encounter of 10/07/21   Comprehensive Metabolic Panel    Specimen: Blood   Result Value Ref Range    Glucose 122 (H) 65 - 99 mg/dL    BUN 8 8 - 23 mg/dL    Creatinine 0.93 0.57 - 1.00 mg/dL    Sodium 135 (L) 136 - 145 mmol/L    Potassium 4.0 3.5 - 5.2 mmol/L    Chloride 99 98 - 107 mmol/L    CO2 22.1 22.0 - 29.0 mmol/L    Calcium 9.9 8.6 - 10.5 mg/dL    Total Protein 8.3 6.0 - 8.5 g/dL    Albumin 5.10 3.50 - 5.20 g/dL    ALT (SGPT) 24 1 - 33 U/L    AST (SGOT) 23 1 - 32 U/L    Alkaline Phosphatase 67 39 - 117 U/L    Total Bilirubin 0.5 0.0 - 1.2 mg/dL    eGFR Non African Amer 61 >60 mL/min/1.73    eGFR  African Amer 74 >60 mL/min/1.73    Globulin 3.2 gm/dL    A/G Ratio 1.6 g/dL    BUN/Creatinine Ratio 8.6 7.0 - 25.0    Anion Gap 13.9 5.0 - 15.0 mmol/L   Lipase    Specimen: Blood   Result Value Ref Range    Lipase 32 13 - 60 U/L   Urinalysis With Microscopic If Indicated (No Culture) - Urine, Clean Catch    Specimen: Urine, Clean Catch   Result Value Ref Range    Color, UA Yellow Yellow, Straw    Appearance, UA Clear Clear    pH, UA 7.0 5.0 - 8.0    Specific Gravity, UA <=1.005 1.005 - 1.030    Glucose, UA Negative Negative    Ketones, UA Negative Negative    Bilirubin, UA Negative Negative    Blood, UA Negative Negative    Protein, UA Negative Negative    Leuk Esterase, UA Negative Negative    Nitrite, UA Negative Negative    Urobilinogen, UA 0.2 E.U./dL 0.2 - 1.0 E.U./dL   CBC Auto Differential    Specimen: Blood   Result Value Ref Range    WBC 10.64 3.40 - 10.80 10*3/mm3    RBC 4.61 3.77 - 5.28 10*6/mm3    Hemoglobin 13.8 12.0 - 15.9 g/dL    Hematocrit 40.4 34.0 - 46.6 %    MCV 87.6 79.0 - 97.0 fL    MCH 29.9 26.6 - 33.0 pg    MCHC 34.2 31.5 - 35.7 g/dL "    RDW 12.8 12.3 - 15.4 %    RDW-SD 41.1 37.0 - 54.0 fl    MPV 11.6 6.0 - 12.0 fL    Platelets 234 140 - 450 10*3/mm3    Neutrophil % 76.5 (H) 42.7 - 76.0 %    Lymphocyte % 16.8 (L) 19.6 - 45.3 %    Monocyte % 5.9 5.0 - 12.0 %    Eosinophil % 0.2 (L) 0.3 - 6.2 %    Basophil % 0.3 0.0 - 1.5 %    Immature Grans % 0.3 0.0 - 0.5 %    Neutrophils, Absolute 8.14 (H) 1.70 - 7.00 10*3/mm3    Lymphocytes, Absolute 1.79 0.70 - 3.10 10*3/mm3    Monocytes, Absolute 0.63 0.10 - 0.90 10*3/mm3    Eosinophils, Absolute 0.02 0.00 - 0.40 10*3/mm3    Basophils, Absolute 0.03 0.00 - 0.20 10*3/mm3    Immature Grans, Absolute 0.03 0.00 - 0.05 10*3/mm3    nRBC 0.0 0.0 - 0.2 /100 WBC   Green Top (Gel)   Result Value Ref Range    Extra Tube Hold for add-ons.    Lavender Top   Result Value Ref Range    Extra Tube hold for add-on    Gold Top - SST   Result Value Ref Range    Extra Tube Hold for add-ons.    Light Blue Top   Result Value Ref Range    Extra Tube hold for add-on      Medications   sodium chloride 0.9 % flush 10 mL (has no administration in time range)     No results found.    Procedures/EKGs:  Procedures    Medical Decision Making:                     MDM  Number of Diagnoses or Management Options  Non-intractable vomiting with nausea, unspecified vomiting type: new and requires workup  Urinary tract infection without hematuria, site unspecified: new and requires workup  Diagnosis management comments: The patient comes to the ED for evaluation of vomiting, likely secondary to concurrent urinary tract infection.  Emesis is much improved in the ED. The patient was given IV fluids and antiemetics in the ED. The patient is resting comfortably and feels better, is alert and in no distress. Repeat examination is unremarkable and benign; in particular, there's no discomfort at McBurney's point. The history, exam, diagnostic testing, and current condition does not suggest acute appendicitis, bowel instruction, acute cholecystitis, bowel  perforation, major gastrointestinal bleeding, severe diverticulitis, abdominal aortic aneurysm, mesenteric ischemia, volvulus, sepsis, or other significant pathology that warrants further testing, continued ED treatment, admission, for surgical evaluation at this point. The vital signs have been stable. Bloodwork performed shows no signs of acute renal failure. The patient does not have uncontrollable pain, intractable vomiting, or other significant symptoms. The patient is now able to tolerate po intake in the ED and has passed a po challenge. The patient's condition is stable and appropriate for discharge from the emergency department.       Amount and/or Complexity of Data Reviewed  Clinical lab tests: reviewed and ordered  Decide to obtain previous medical records or to obtain history from someone other than the patient: yes    Risk of Complications, Morbidity, and/or Mortality  Presenting problems: low  Diagnostic procedures: low  Management options: low    Patient Progress  Patient progress: improved       Final diagnoses:   None        Disposition:  ED Disposition     None           Kay Alegria, MADELYN  10/08/21 0109

## 2021-10-08 NOTE — DISCHARGE INSTRUCTIONS
Drink plenty of fluids.  Take nausea medication as prescribed.  Be sure to schedule follow-up appointment with your primary care provider to check improvement of your urinary tract infection.  Return for worsening symptoms, specifically if your unable to keep any fluids or solids down despite medication, you develop a fever, or any other concerns.

## 2021-10-11 ENCOUNTER — LAB (OUTPATIENT)
Dept: LAB | Facility: HOSPITAL | Age: 61
End: 2021-10-11

## 2021-10-11 ENCOUNTER — OFFICE VISIT (OUTPATIENT)
Dept: FAMILY MEDICINE CLINIC | Facility: CLINIC | Age: 61
End: 2021-10-11

## 2021-10-11 ENCOUNTER — TRANSCRIBE ORDERS (OUTPATIENT)
Dept: CARDIOLOGY | Facility: HOSPITAL | Age: 61
End: 2021-10-11

## 2021-10-11 VITALS
DIASTOLIC BLOOD PRESSURE: 66 MMHG | BODY MASS INDEX: 22.82 KG/M2 | HEIGHT: 65 IN | WEIGHT: 137 LBS | SYSTOLIC BLOOD PRESSURE: 120 MMHG | HEART RATE: 57 BPM | OXYGEN SATURATION: 99 %

## 2021-10-11 VITALS
WEIGHT: 137 LBS | BODY MASS INDEX: 22.82 KG/M2 | HEIGHT: 65 IN | SYSTOLIC BLOOD PRESSURE: 120 MMHG | HEART RATE: 57 BPM | DIASTOLIC BLOOD PRESSURE: 66 MMHG | OXYGEN SATURATION: 98 %

## 2021-10-11 DIAGNOSIS — R07.9 CHEST PAIN, UNSPECIFIED TYPE: Primary | ICD-10-CM

## 2021-10-11 DIAGNOSIS — Z00.00 MEDICARE ANNUAL WELLNESS VISIT, SUBSEQUENT: Primary | ICD-10-CM

## 2021-10-11 DIAGNOSIS — M54.50 ACUTE LOW BACK PAIN WITHOUT SCIATICA, UNSPECIFIED BACK PAIN LATERALITY: ICD-10-CM

## 2021-10-11 DIAGNOSIS — M25.552 LEFT HIP PAIN: ICD-10-CM

## 2021-10-11 DIAGNOSIS — K59.00 CONSTIPATION, UNSPECIFIED CONSTIPATION TYPE: ICD-10-CM

## 2021-10-11 DIAGNOSIS — R07.9 CHEST PAIN, UNSPECIFIED TYPE: ICD-10-CM

## 2021-10-11 DIAGNOSIS — N39.0 URINARY TRACT INFECTION WITHOUT HEMATURIA, SITE UNSPECIFIED: Primary | ICD-10-CM

## 2021-10-11 LAB
ALBUMIN SERPL-MCNC: 4.6 G/DL (ref 3.5–5.2)
ALBUMIN/GLOB SERPL: 1.6 G/DL
ALP SERPL-CCNC: 65 U/L (ref 39–117)
ALT SERPL W P-5'-P-CCNC: 29 U/L (ref 1–33)
ANION GAP SERPL CALCULATED.3IONS-SCNC: 7.9 MMOL/L (ref 5–15)
AST SERPL-CCNC: 24 U/L (ref 1–32)
BILIRUB BLD-MCNC: NEGATIVE MG/DL
BILIRUB SERPL-MCNC: 0.4 MG/DL (ref 0–1.2)
BUN SERPL-MCNC: 9 MG/DL (ref 8–23)
BUN/CREAT SERPL: 13.6 (ref 7–25)
CALCIUM SPEC-SCNC: 9.4 MG/DL (ref 8.6–10.5)
CHLORIDE SERPL-SCNC: 103 MMOL/L (ref 98–107)
CHOLEST SERPL-MCNC: 170 MG/DL (ref 0–200)
CLARITY, POC: CLEAR
CO2 SERPL-SCNC: 24.1 MMOL/L (ref 22–29)
COLOR UR: YELLOW
CREAT SERPL-MCNC: 0.66 MG/DL (ref 0.57–1)
EXPIRATION DATE: NORMAL
GFR SERPL CREATININE-BSD FRML MDRD: 110 ML/MIN/1.73
GFR SERPL CREATININE-BSD FRML MDRD: 91 ML/MIN/1.73
GLOBULIN UR ELPH-MCNC: 2.8 GM/DL
GLUCOSE SERPL-MCNC: 96 MG/DL (ref 65–99)
GLUCOSE UR STRIP-MCNC: NEGATIVE MG/DL
HDLC SERPL-MCNC: 36 MG/DL (ref 40–60)
KETONES UR QL: NEGATIVE
LDLC SERPL CALC-MCNC: 110 MG/DL (ref 0–100)
LDLC/HDLC SERPL: 2.98 {RATIO}
LEUKOCYTE EST, POC: NEGATIVE
Lab: NORMAL
NITRITE UR-MCNC: NEGATIVE MG/ML
PH UR: 6 [PH] (ref 5–8)
POTASSIUM SERPL-SCNC: 4.1 MMOL/L (ref 3.5–5.2)
PROT SERPL-MCNC: 7.4 G/DL (ref 6–8.5)
PROT UR STRIP-MCNC: NEGATIVE MG/DL
RBC # UR STRIP: NEGATIVE /UL
SODIUM SERPL-SCNC: 135 MMOL/L (ref 136–145)
SP GR UR: 1.01 (ref 1–1.03)
TRIGL SERPL-MCNC: 134 MG/DL (ref 0–150)
UROBILINOGEN UR QL: NORMAL
VLDLC SERPL-MCNC: 24 MG/DL (ref 5–40)

## 2021-10-11 PROCEDURE — 81003 URINALYSIS AUTO W/O SCOPE: CPT | Performed by: NURSE PRACTITIONER

## 2021-10-11 PROCEDURE — 80061 LIPID PANEL: CPT

## 2021-10-11 PROCEDURE — 36415 COLL VENOUS BLD VENIPUNCTURE: CPT

## 2021-10-11 PROCEDURE — G0439 PPPS, SUBSEQ VISIT: HCPCS | Performed by: NURSE PRACTITIONER

## 2021-10-11 PROCEDURE — 99213 OFFICE O/P EST LOW 20 MIN: CPT | Performed by: NURSE PRACTITIONER

## 2021-10-11 PROCEDURE — 80053 COMPREHEN METABOLIC PANEL: CPT

## 2021-10-11 NOTE — PROGRESS NOTES
Chief Complaint  Urinary Tract Infection, Back Pain, and Constipation    Subjective            Tae N Range presents to [facility]  Pt has c/o possible UTI still. Pt is still having lower back pain. Pt took macrobid and made her stomach very upset. Pt was switched to Bactrim and is now finished with that. Pt states the s/s of a UTI have subsided, but wants to make sure UTI is gone.      Pt has c/o constipation.    Pt reports hx of low back and left hip pain and saw Dr. Styles and he order PT however at the time she could not go to to [facility] but would like to be rescheduled.         [facility]  Past Medical History:   Diagnosis Date   • Abnormal bone density screening 10/15/2014    osteoporosis- tried fosamax in the past   • Acid reflux    • Arthritis 07/15/2021    left hip pain. risk for falls.    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    • COLONOSCOPY N/A 7/15/2021    Procedure: COLONOSCOPY with polypectomy/snare;  Surgeon: Adelfo Story MD;  Location: [facility] ENDOSCOPY;  Service: General;  Laterality: N/A;  colon polyps   • ENDOSCOPY N/A 7/15/2021    Procedure: ESOPHAGOGASTRODUODENOSCOPY with biopsies;  Surgeon: Adelfo Story MD;  Location: [facility] ENDOSCOPY;  Service: General;  Laterality: N/A;  gastric nodule   • SHOULDER SURGERY Right 2014    IAC         SOCX  Social History     Tobacco Use   • Smoking status: Never Smoker   • Smokeless tobacco: Never Used   Substance Use Topics   • Alcohol use: Never       FAMHX  Family History   Problem Relation Age of Onset   • Heart attack Mother   "  • Colon cancer Mother    • Heart disease Father    • Breast cancer Sister    • Stroke Other    • Breast cancer Other    • Malig Hyperthermia Neg Hx         MEDSIGONLY  Current Outpatient Medications on File Prior to Visit   Medication Sig   • Acetaminophen 325 MG capsule acetaminophen 325 mg oral capsule take 2 capsules by oral route daily   Active   • atorvastatin (LIPITOR) 20 MG tablet Take 1 tablet by mouth Every Night.   • cetirizine (zyrTEC) 10 MG tablet Take 1 tablet by mouth Daily.   • ibuprofen (ADVIL,MOTRIN) 800 MG tablet Take 1 tablet by mouth Every 6 (Six) Hours As Needed for Mild Pain .   • metoprolol succinate XL (TOPROL-XL) 25 MG 24 hr tablet Take 1 tablet by mouth Daily.   • ondansetron ODT (ZOFRAN-ODT) 4 MG disintegrating tablet Place 1 tablet on the tongue Every 8 (Eight) Hours As Needed for Nausea or Vomiting for up to 10 days.   • phenazopyridine (Pyridium) 200 MG tablet Take 1 tablet by mouth 3 (Three) Times a Day As Needed for Bladder Spasms.   • prochlorperazine (COMPAZINE) 5 MG tablet Take 1 tablet by mouth Every 6 (Six) Hours As Needed for Nausea or Vomiting for up to 5 days.   • sulfamethoxazole-trimethoprim (Bactrim DS) 800-160 MG per tablet Take 1 tablet by mouth 2 (Two) Times a Day.   • [DISCONTINUED] multivitamin with minerals (MULTIVITAMIN ADULTS 50+ PO) Take 1 tablet by mouth Daily.   • [DISCONTINUED] nitrofurantoin, macrocrystal-monohydrate, (Macrobid) 100 MG capsule Take 1 capsule by mouth 2 (Two) Times a Day.     No current facility-administered medications on file prior to visit.       Health Maintenance Due   Topic Date Due   • TDAP/TD VACCINES (1 - Tdap) Never done   • INFLUENZA VACCINE  08/01/2021       Objective     /66   Pulse 57   Ht 165.1 cm (65\")   Wt 62.1 kg (137 lb)   SpO2 98%   BMI 22.80 kg/m²       Physical Exam  Constitutional:       General: She is not in acute distress.     Appearance: Normal appearance. She is not ill-appearing.   HENT:      Head: " Normocephalic and atraumatic.      Mouth/Throat:      Pharynx: No oropharyngeal exudate or posterior oropharyngeal erythema.   Cardiovascular:      Rate and Rhythm: Normal rate and regular rhythm.      Heart sounds: Normal heart sounds. No murmur heard.      Pulmonary:      Effort: Pulmonary effort is normal. No respiratory distress.      Breath sounds: Normal breath sounds.   Chest:      Chest wall: No tenderness.   Abdominal:      General: Abdomen is flat. Bowel sounds are normal. There is no distension.      Palpations: Abdomen is soft. There is no mass.      Tenderness: There is no abdominal tenderness. There is no guarding.   Genitourinary:     Comments: Negative CVA tenderness  Musculoskeletal:         General: No swelling or tenderness. Normal range of motion.      Cervical back: Normal range of motion and neck supple.   Skin:     General: Skin is warm and dry.      Findings: No rash.   Neurological:      General: No focal deficit present.      Mental Status: She is alert and oriented to person, place, and time. Mental status is at baseline.      Gait: Gait normal.   Psychiatric:         Mood and Affect: Mood normal.         Behavior: Behavior normal.         Thought Content: Thought content normal.         Judgment: Judgment normal.           Result Review :                           Assessment and Plan        Diagnoses and all orders for this visit:    1. Urinary tract infection without hematuria, site unspecified (Primary)  -     POCT urinalysis dipstick, automated    2. Acute low back pain without sciatica, unspecified back pain laterality  -     POCT urinalysis dipstick, automated  -     Ambulatory Referral to Physical Therapy Evaluate and treat    3. Constipation, unspecified constipation type  Comments:  advised Miralax OTC prn for constipation    4. Left hip pain  -     Ambulatory Referral to Physical Therapy Evaluate and treat              Follow Up     Return in about 6 months (around 4/11/2022), or  if symptoms worsen or fail to improve.    Patient was given instructions and counseling regarding her condition or for health maintenance advice. Please see specific information pulled into the AVS if appropriate.     Idrisvictor manuel LEON Gentry  reports that she has never smoked. She has never used smokeless tobacco..

## 2021-10-11 NOTE — PROGRESS NOTES
The ABCs of the Annual Wellness Visit  Subsequent Medicare Wellness Visit    Chief Complaint   Patient presents with   • Medicare Wellness-subsequent      Subjective    History of Present Illness:  Panda Rinaldi is a 61 y.o. female who presents for a Subsequent Medicare Wellness Visit.    The following portions of the patient's history were reviewed and   updated as appropriate: allergies, current medications, past family history, past medical history, past social history, past surgical history and problem list.    Compared to one year ago, the patient feels her physical   health is the same.    Compared to one year ago, the patient feels her mental   health is the same.    Recent Hospitalizations:  This patient has had a [facility] admission record on file within the last 365 days.    Current Medical Providers:  Patient Care Team:  Sadie Fragoso APRN as PCP - General (Nurse Practitioner)    Outpatient Medications Prior to Visit   Medication Sig Dispense Refill   • Acetaminophen 325 MG capsule acetaminophen 325 mg oral capsule take 2 capsules by oral route daily   Active     • atorvastatin (LIPITOR) 20 MG tablet Take 1 tablet by mouth Every Night. 90 tablet 0   • cetirizine (zyrTEC) 10 MG tablet Take 1 tablet by mouth Daily. 90 tablet 1   • ibuprofen (ADVIL,MOTRIN) 800 MG tablet Take 1 tablet by mouth Every 6 (Six) Hours As Needed for Mild Pain . 90 tablet 1   • metoprolol succinate XL (TOPROL-XL) 25 MG 24 hr tablet Take 1 tablet by mouth Daily. 90 tablet 0   • ondansetron ODT (ZOFRAN-ODT) 4 MG disintegrating tablet Place 1 tablet on the tongue Every 8 (Eight) Hours As Needed for Nausea or Vomiting for up to 10 days. 30 tablet 0   • phenazopyridine (Pyridium) 200 MG tablet Take 1 tablet by mouth 3 (Three) Times a Day As Needed for Bladder Spasms. 15 tablet 0   • prochlorperazine (COMPAZINE) 5 MG tablet Take 1 tablet by mouth Every 6 (Six) Hours As Needed for Nausea or Vomiting for up to 5 days. 20 tablet 0   •  "sulfamethoxazole-trimethoprim (Bactrim DS) 800-160 MG per tablet Take 1 tablet by mouth 2 (Two) Times a Day. 6 tablet 0   • multivitamin with minerals (MULTIVITAMIN ADULTS 50+ PO) Take 1 tablet by mouth Daily.     • nitrofurantoin, macrocrystal-monohydrate, (Macrobid) 100 MG capsule Take 1 capsule by mouth 2 (Two) Times a Day. 14 capsule 0     No facility-administered medications prior to visit.       No opioid medication identified on active medication list. I have reviewed chart for other potential  high risk medication/s and harmful drug interactions in the elderly.          Aspirin is not on active medication list.  Aspirin use is not indicated based on review of current medical condition/s. Risk of harm outweighs potential benefits.  .    Patient Active Problem List   Diagnosis   • Osteoarthritis   • Heart disease   • Hyperlipemia   • Radicular pain   • Neck pain   • Palpitations   • Prolapsed cervical intervertebral disc   • Seasonal allergic rhinitis   • Shoulder pain   • Acid reflux   • Colon polyps     Advance Care Planning  Advance Directive is not on file.  ACP discussion was declined by the patient. Patient does not have an advance directive, information provided.          Objective    Vitals:    10/11/21 0951   BP: 120/66   Pulse: 57   SpO2: 99%   Weight: 62.1 kg (137 lb)   Height: 165.1 cm (65\")     BMI Readings from Last 1 Encounters:   10/11/21 22.80 kg/m²   BMI is within normal parameters. No follow-up required.    Does the patient have evidence of cognitive impairment? No                HEALTH RISK ASSESSMENT    Smoking Status:  Social History     Tobacco Use   Smoking Status Never Smoker   Smokeless Tobacco Never Used     Alcohol Consumption:  Social History     Substance and Sexual Activity   Alcohol Use Never     Fall Risk Screen:    STEADI Fall Risk Assessment was completed, and patient is at LOW risk for falls.Assessment completed on:10/11/2021    Depression Screening:  PHQ-2/PHQ-9 Depression " Screening 10/11/2021   Little interest or pleasure in doing things 0   Feeling down, depressed, or hopeless 0   Trouble falling or staying asleep, or sleeping too much 0   Feeling tired or having little energy 0   Poor appetite or overeating 0   Feeling bad about yourself - or that you are a failure or have let yourself or your family down 0   Trouble concentrating on things, such as reading the newspaper or watching television 0   Moving or speaking so slowly that other people could have noticed. Or the opposite - being so fidgety or restless that you have been moving around a lot more than usual 0   Thoughts that you would be better off dead, or of hurting yourself in some way 0   Total Score 0   If you checked off any problems, how difficult have these problems made it for you to do your work, take care of things at home, or get along with other people? Not difficult at all       Health Habits and Functional and Cognitive Screening:  Functional & Cognitive Status 10/11/2021   Do you have difficulty preparing food and eating? No   Do you have difficulty bathing yourself, getting dressed or grooming yourself? No   Do you have difficulty using the toilet? No   Do you have difficulty moving around from place to place? No   Do you have trouble with steps or getting out of a bed or a chair? No   Current Diet Well Balanced Diet   Dental Exam Up to date   Eye Exam Not up to date   Exercise (times per week) 2 times per week   Current Exercises Include Swimming   Do you need help using the phone?  No   Are you deaf or do you have serious difficulty hearing?  No   Do you need help with transportation? No   Do you need help shopping? No   Do you need help preparing meals?  No   Do you need help with housework?  No   Do you need help with laundry? No   Do you need help taking your medications? No   Do you need help managing money? No   Do you ever drive or ride in a car without wearing a seat belt? No   Have you felt unusual  stress, anger or loneliness in the last month? No   Who do you live with? Spouse   If you need help, do you have trouble finding someone available to you? No   Have you been bothered in the last four weeks by sexual problems? No   Do you have difficulty concentrating, remembering or making decisions? No       Age-appropriate Screening Schedule:  Refer to the list below for future screening recommendations based on patient's age, sex and/or medical conditions. Orders for these recommended tests are listed in the plan section. The patient has been provided with a written plan.    Health Maintenance   Topic Date Due   • TDAP/TD VACCINES (1 - Tdap) Never done   • INFLUENZA VACCINE  08/01/2021   • LIPID PANEL  04/02/2022   • PAP SMEAR  04/25/2022   • MAMMOGRAM  01/25/2023   • DXA SCAN  07/19/2023   • ZOSTER VACCINE  Completed              Assessment/Plan   CMS Preventative Services Quick Reference  Risk Factors Identified During Encounter  None Identified  The above risks/problems have been discussed with the patient.  Follow up actions/plans if indicated are seen below in the Assessment/Plan Section.  Pertinent information has been shared with the patient in the After Visit Summary.    Diagnoses and all orders for this visit:    1. Medicare annual wellness visit, subsequent (Primary)        Follow Up:   Return in about 1 year (around 10/11/2022) for Medicare Wellness.     An After Visit Summary and PPPS were made available to the patient.

## 2021-11-03 ENCOUNTER — TREATMENT (OUTPATIENT)
Dept: PHYSICAL THERAPY | Facility: CLINIC | Age: 61
End: 2021-11-03

## 2021-11-03 DIAGNOSIS — R29.898 WEAKNESS OF LEFT HIP: ICD-10-CM

## 2021-11-03 DIAGNOSIS — M25.559 HIP PAIN: Primary | ICD-10-CM

## 2021-11-03 PROCEDURE — 97110 THERAPEUTIC EXERCISES: CPT | Performed by: PHYSICAL THERAPIST

## 2021-11-03 PROCEDURE — 97161 PT EVAL LOW COMPLEX 20 MIN: CPT | Performed by: PHYSICAL THERAPIST

## 2021-11-03 PROCEDURE — 97012 MECHANICAL TRACTION THERAPY: CPT | Performed by: PHYSICAL THERAPIST

## 2021-11-03 NOTE — PROGRESS NOTES
Physical Therapy Initial Evaluation and Plan of Care      Patient: Panda LEON Range   : 1960  Diagnosis/ICD-10 Code:  Hip pain [M25.559]  Referring practitioner: MADELYN Mendiola  Date of Initial Visit: 11/3/2021  Today's Date: 11/3/2021  Patient seen for 1 sessions         Subjective Questionnaire:  Optimal: 40-59% limitation    Subjective Evaluation    History of Present Illness  Mechanism of injury: Pt is a 61 year old female who reports to physical therapy secondary to back pain and L hip pain, which she has had for 10 years. She reports an insidious onset, which has gotten worse overtime. Pt has gotten an injection in her hip, which somewhat helped. She reports that she was diagnosed with trochanteric bursitis. Pt also reports numbness in her L LE.  Per lumbar MRI: There are multilevel degenerative changes as described.  The findings seem most pronounced at   the L4-5 level.There are multilevel degenerative changes multiple disc bulge from L1 to S1.   Per L hip MRI:  Mild bilateral hip osteoarthritis, Chronic degeneration and tear of the anterior-superior left labrum, Gluteal tendinopathy without definite high-grade tendon tear.  Mild degenerative changes at the sacroiliac joint.    Pain  Current pain ratin  At best pain ratin  At worst pain ratin  Relieving factors: relaxation (and laying on side)  Aggravating factors: squatting, movement and standing (bending)    Treatments  Previous treatment: physical therapy (pt had previous physical therapy for hip prior to injection.)       Objective          Static Posture     Comments  When seated with back unsupported, pt leans towards her R side with her arms supported to off-weight her L side. When sitting on chair with back supported. Pt sits at the edge of the chair and leans back with legs extended.    Active Range of Motion     Additional Active Range of Motion Details  Lumbar  Active Range              Flexion   80%   Extension  50%         Left Right   Sidebending  70% 70%   Rotation  70% 70%         Strength/Myotome Testing     Left Hip   Planes of Motion   Flexion: 3+  Abduction: 4-  Adduction: 4-    Right Hip   Planes of Motion   Flexion: 4-  Abduction: 4  Adduction: 4    Left Knee   Flexion: 4-  Extension: 4-    Right Knee   Flexion: 4  Extension: 4    Ambulation     Observational Gait   Decreased walking speed and stride length.     Functional Assessment     Comments  Instructions:  Please select the level of difficulty you have for each activity below.      Able to do without      Able to do with      Able to do with         Able to do with      Unable to do      N/A    any difficulty         little difficulty         moderate difficulty    much difficulty    6.  Bending  1  (*)          2  (*)               3  (*)                        4  (x)                     5  (*)              9  (*)  /Stooping  9.  Standing  1  (*)          2  (*)               3  (x)                        4  (*)                     5  (*)              9  (*)  10. Walking-  1  (*)          2  (x)               3  (*)                        4  (*)                     5  (*)              9  (*)  short distances    Total Score: (*) (all items) (9) (3 items) (*)  (1 item)    % Limitation   All Items  3 Items  1 Item    (*) 0%   (Score of 22)   (Score of 3)   (Score of 1)  (*) 1-19%    (Score of 23-40)  (Score of 4-5)   (Score of 2)  (*) 20-39%  (Score of 41-58)  (Score of 6-7)   (Score of 2)  (x) 40-59%  (Score of 59-76)  (Score of 8-9)   (Score of 3)  (*) 60-79%  (Score of 77-94)  (Score of 10-11)  (Score of 3)  (*) 80-99%  (Score of )  (Score of 12-14)  (Score of 4)  (*) 100%   (Score of 110)   (Score of 15)   (Score of 5)      Assessment & Plan     Assessment  Functional Limitations: walking and standing  Goals  Plan Goals: LOW BACK and HIP PROBLEMS:    1. The patient complains of low back and hip pain.  LTG 1: 8 weeks:   The patient will report a pain rating of 2/10 or better in order to improve  tolerance to activities of daily living and improve sleep quality.  STATUS:  New  STG 1a: 4 weeks:  The patient will report a pain rating of 4/10 or better.  STATUS:  New  TREATMENT:  Therapeutic exercises, manual therapy, aquatic therapy, home exercise   instruction, and modalities as needed for pain to include:  electrical stimulation, moist heat, ice,   ultrasound, and diathermy.      2. The patient demonstrates weakness of the L hip.  LTG 2: 8 weeks:  The patient will demonstrate 4+ /5 strength for L hip flexion, abduction,  and extension in order to improve hip stability.  STATUS:  New  STG 2a: 4 weeks:  The patient will demonstrate 4 /5 strength for L hip flexion, abduction,  and extension.  STATUS:  New  TREATMENT: Therapeutic exercises, manual therapy, aquatic therapy, home exercise instruction,  and modalities as needed for pain to include:  electrical stimulation, moist heat, ice, ultrasound, and   diathermy.      3. The patient has gait dysfunction.  LTG 3: 8 weeks:  The patient will ambulate without assistive device, independently, for   community distances with minimal limp to the L lower extremity in order to improve mobility and allow   patient to perform activities such as grocery shopping with greater ease.  STATUS:  New  TREATMENT: Gait training, aquatic therapy, therapeutic exercise, and home exercise instruction.      Plan  Therapy options: will be seen for skilled physical therapy services  Planned therapy interventions: manual therapy, strengthening, stretching, gait training and home exercise program  Frequency: 2x week  Duration in visits: 20  Duration in weeks: 10  Plan details: Manual therapy, therapeutic exercises, pt education and HEP.      Pt presents with limitations, that impede her ability to ambulate and perform functional tasks. The skills of a therapist will be required to safely and effectively implement  the following treatment plan to restore maximal level of function.      Visit Diagnoses:    ICD-10-CM ICD-9-CM   1. Hip pain  M25.559 719.45   2. Weakness of left hip  R29.898 729.89     Timed:         Manual Therapy:    0     mins  13599;     Therapeutic Exercise:    10     mins  34222;     Neuromuscular Teresita:    0    mins  77383;    Therapeutic Activity:     0     mins  55050;     Gait Trainin     mins  62734;     Ultrasound:     0     mins  05953;      Un-Timed:  Electrical Stimulation:    0     mins  48443 ( );  Traction     10     mins 15564  Low Eval     30     Mins  99307  Mod Eval     0     Mins  73112  High Eval                       0     Mins  25444    Timed Treatment:   10   mins   Total Treatment:     50   mins    PT SIGNATURE: Hawa Borja PT     Electronically Signed 11/3/2021    [place] License: [id number]    Initial Certification  Certification Period: 11/3/2021 thru 2022  I certify that the therapy services are furnished while this patient is under my care.  The services outlined above are required by this patient, and will be reviewed every 90 days.     PHYSICIAN: Saide Fragoso APRN      DATE:     Please sign and return via fax to [phone].. Thank you, [facility].

## 2021-11-17 ENCOUNTER — TREATMENT (OUTPATIENT)
Dept: PHYSICAL THERAPY | Facility: CLINIC | Age: 61
End: 2021-11-17

## 2021-11-17 DIAGNOSIS — M25.559 HIP PAIN: Primary | ICD-10-CM

## 2021-11-17 DIAGNOSIS — R29.898 WEAKNESS OF LEFT HIP: ICD-10-CM

## 2021-11-17 PROCEDURE — 97110 THERAPEUTIC EXERCISES: CPT | Performed by: PHYSICAL THERAPIST

## 2021-11-17 PROCEDURE — 97012 MECHANICAL TRACTION THERAPY: CPT | Performed by: PHYSICAL THERAPIST

## 2021-11-17 NOTE — PROGRESS NOTES
Physical Therapy Daily Treatment Note      Patient: Panda LEON Range   : 1960  Referring practitioner: MADELYN Mendiola  Date of Initial Visit: Type: THERAPY  Noted: 11/3/2021  Today's Date: 2021  Patient seen for 2 sessions           Subjective Questionnaire:       Subjective Evaluation    History of Present Illness    Subjective comment: Pt presents to clinic with report of 2/10 low back and L hip pain.  Pt reported traction felt good.Pain  Current pain ratin           Objective   See Exercise, Manual, and Modality Logs for complete treatment.       Assessment/Plan    Visit Diagnoses:    ICD-10-CM ICD-9-CM   1. Hip pain  M25.559 719.45   2. Weakness of left hip  R29.898 729.89       Progress per Plan of Care and Progress strengthening /stabilization /functional activity           Timed:  Manual Therapy:         mins  70348;  Therapeutic Exercise:    15     mins  29782;     Neuromuscular Teresita:        mins  67163;    Therapeutic Activity:          mins  09331;     Gait Training:           mins  23495;     Ultrasound:          mins  15045;    Electrical Stimulation:         mins  48225 ( );  Aquatic Therapy          mins  82724    Untimed:  Electrical Stimulation:         mins  47946 ( );  Mechanical Traction:    17     mins  47067;     Timed Treatment:   32   mins   Total Treatment:     32   mins    Electronically signed    Gertrude Madrid PTA  Physical Therapist Assistant    [employer] license: [id number]

## 2021-11-19 ENCOUNTER — TREATMENT (OUTPATIENT)
Dept: PHYSICAL THERAPY | Facility: CLINIC | Age: 61
End: 2021-11-19

## 2021-11-19 DIAGNOSIS — M25.559 HIP PAIN: Primary | ICD-10-CM

## 2021-11-19 DIAGNOSIS — R29.898 WEAKNESS OF LEFT HIP: ICD-10-CM

## 2021-11-19 PROCEDURE — 97140 MANUAL THERAPY 1/> REGIONS: CPT | Performed by: PHYSICAL THERAPIST

## 2021-11-19 PROCEDURE — 97012 MECHANICAL TRACTION THERAPY: CPT | Performed by: PHYSICAL THERAPIST

## 2021-11-19 NOTE — PROGRESS NOTES
Physical Therapy Daily Treatment Note      Patient: Panda LEON Range   : 1960  Referring practitioner: MADELYN Mendiola  Date of Initial Visit: Type: THERAPY  Noted: 11/3/2021  Today's Date: 2021  Patient seen for 8 sessions         Subjective   Tae Range reports: Pt reports continued pain in L hip area.    Objective   See Exercise, Manual, and Modality Logs for complete treatment.     Assessment/Plan    Visit Diagnoses:    ICD-10-CM ICD-9-CM   1. Hip pain  M25.559 719.45   2. Weakness of left hip  R29.898 729.89       Progress per Plan of Care           Timed:  Manual Therapy:    10     mins  88968;  Therapeutic Exercise:    0     mins  16839;     Neuromuscular Teresita:    0    mins  76973;    Therapeutic Activity:     0     mins  35477;     Gait Trainin     mins  63407;     Aquatic Therapy     0     mins  30482;     Ultrasound:     0     mins  98479;    Electrical Stimulation:    0     mins  55709 ( );    Untimed:  Electrical Stimulation:    0     mins  93863 ( );  Mechanical Traction:    12     mins  10676;     Timed Treatment:   8   mins   Total Treatment:     22   mins  Hawa Borja PT    [place] License: [id number]

## 2021-12-01 ENCOUNTER — TREATMENT (OUTPATIENT)
Dept: PHYSICAL THERAPY | Facility: CLINIC | Age: 61
End: 2021-12-01

## 2021-12-01 DIAGNOSIS — M25.559 HIP PAIN: Primary | ICD-10-CM

## 2021-12-01 DIAGNOSIS — R29.898 WEAKNESS OF LEFT HIP: ICD-10-CM

## 2021-12-01 PROCEDURE — 97140 MANUAL THERAPY 1/> REGIONS: CPT | Performed by: PHYSICAL THERAPIST

## 2021-12-01 PROCEDURE — 97012 MECHANICAL TRACTION THERAPY: CPT | Performed by: PHYSICAL THERAPIST

## 2021-12-01 NOTE — PROGRESS NOTES
"Physical Therapy Daily Treatment Note      Patient: Panda LEON Range   : 1960  Referring practitioner: MADELYN Mendiola  Date of Initial Visit: Type: THERAPY  Noted: 11/3/2021  Today's Date: 2021  Patient seen for 4 sessions           Subjective  Tae Range reports: L hip pain and stiffness.        Objective   See Exercise, Manual, and Modality Logs for complete treatment.       Assessment/Plan  Pt reported she felt better after the manual work was performed. \"Not so stiff.\" Continued need for skilled care to attend to pain, limits in strength and gross function.  Visit Diagnoses:    ICD-10-CM ICD-9-CM   1. Hip pain  M25.559 719.45   2. Weakness of left hip  R29.898 729.89       Progress per Plan of Care and Progress strengthening /stabilization /functional activity           Timed:  Manual Therapy:    12     mins  93905;  Therapeutic Exercise:         mins  69664;     Neuromuscular Teresita:        mins  30667;    Therapeutic Activity:          mins  11979;     Gait Training:           mins  31008;     Ultrasound:          mins  01750;    Electrical Stimulation:         mins  57441 ( );  Aquatics  __   mins   58214    Untimed:  Electrical Stimulation:         mins  14223 ( );  Mechanical Traction:    15     mins  88824;     Timed Treatment:   12   mins   Total Treatment:        mins    Electronically Signed:  Doreen Ordonez PTA  Physical Therapist Assistant    [facility] license [id number]            "

## 2021-12-03 ENCOUNTER — TREATMENT (OUTPATIENT)
Dept: PHYSICAL THERAPY | Facility: CLINIC | Age: 61
End: 2021-12-03

## 2021-12-03 DIAGNOSIS — M25.559 HIP PAIN: ICD-10-CM

## 2021-12-03 DIAGNOSIS — R29.898 WEAKNESS OF LEFT HIP: Primary | ICD-10-CM

## 2021-12-03 PROCEDURE — 97110 THERAPEUTIC EXERCISES: CPT | Performed by: PHYSICAL THERAPIST

## 2021-12-03 PROCEDURE — 97012 MECHANICAL TRACTION THERAPY: CPT | Performed by: PHYSICAL THERAPIST

## 2021-12-03 NOTE — PROGRESS NOTES
PROGRESS NOTE    Patient: Panda Rinaldi   : 1960  Diagnosis/ICD-10 Code:  Weakness of left hip [R29.898]  Referring practitioner: MADELYN Mendiola  Date of Initial Visit: Type: THERAPY  Noted: 11/3/2021  Today's Date: 12/3/2021  Patient seen for 5 sessions    Subjective:   Panda Rinaldi reports: Therapy is helping her somewhat, but she admits that she has had pain all her life and it comes and goes. She states that the pain is much better and the traction helps. She states that the sensation is lighter in her back after traction and she does not have as much pain in her leg afterwards.   Clinical Progress: improved  Home Program Compliance: Yes  Treatment has included: therapeutic exercise, neuromuscular re-education, manual therapy and therapeutic activity    Subjective   Objective          Static Posture     Comments  When seated with back unsupported, pt leans towards her R side with her arms supported to off-weight her L side. When sitting on chair with back supported. Pt sits at the edge of the chair and leans back with legs extended.    Active Range of Motion     Additional Active Range of Motion Details  Lumbar  Active Range             Flexion   80%   Extension  50%         Left Right   Sidebending  70% 70%   Rotation  70% 70%         Strength/Myotome Testing     Left Hip   Planes of Motion   Flexion: 4-  Abduction: 4-  Adduction: 4-    Right Hip   Planes of Motion   Flexion: 4-  Abduction: 4  Adduction: 4    Left Knee   Flexion: 4-  Extension: 4-    Right Knee   Flexion: 4  Extension: 4    Ambulation     Observational Gait   Decreased walking speed and stride length.     Functional Assessment     Comments  Instructions:  Please select the level of difficulty you have for each activity below.      Able to do without      Able to do with      Able to do with         Able to do with      Unable to do      N/A    any difficulty         little difficulty         moderate difficulty    much difficulty    6.   Bending  1  (*)          2  (*)               3  (*)                        4  (x)                     5  (*)              9  (*)  /Stooping  9.  Standing  1  (*)          2  (*)               3  (x)                        4  (*)                     5  (*)              9  (*)  10. Walking-  1  (*)          2  (x)               3  (*)                        4  (*)                     5  (*)              9  (*)  short distances    Total Score: (*) (all items) (9) (3 items) (*)  (1 item)    % Limitation   All Items  3 Items  1 Item    (*) 0%   (Score of 22)   (Score of 3)   (Score of 1)  (*) 1-19%    (Score of 23-40)  (Score of 4-5)   (Score of 2)  (*) 20-39%  (Score of 41-58)  (Score of 6-7)   (Score of 2)  (x) 40-59%  (Score of 59-76)  (Score of 8-9)   (Score of 3)  (*) 60-79%  (Score of 77-94)  (Score of 10-11)  (Score of 3)  (*) 80-99%  (Score of )  (Score of 12-14)  (Score of 4)  (*) 100%   (Score of 110)   (Score of 15)   (Score of 5)      Assessment & Plan       Plan  Plan details: Continue with POC.      Progress toward previous goals: Partially Met    Recommendations: Continue as planned  Prognosis to achieve goals: good    PT Signature: Hawa Borja, PT    Electronically Signed 12/3/2021    [place] License: [id number]    Based upon review of the patient's progress and continued therapy plan, it is my medical opinion that Tavictor manuel Range should continue physical therapy treatment at [facility]  [address]  [place] [address] [phone].    Timed:         Manual Therapy:    0     mins  13833;     Therapeutic Exercise:    8     mins  58097;     Neuromuscular Teresita:    0    mins  64422;    Therapeutic Activity:     0     mins  54163;     Gait Trainin     mins  99695;     Ultrasound:     0     mins  56443;    Ionto                               0    mins   26121  Aquatic                          0     mins 60821      Un-Timed:  Electrical Stimulation:    0      mins  48737 ( );  Dry Needling     0     mins self-pay  Traction     15     mins 55274      Timed Treatment:   8   mins   Total Treatment:     23   mins

## 2021-12-10 ENCOUNTER — TREATMENT (OUTPATIENT)
Dept: PHYSICAL THERAPY | Facility: CLINIC | Age: 61
End: 2021-12-10

## 2021-12-10 DIAGNOSIS — M25.559 HIP PAIN: ICD-10-CM

## 2021-12-10 DIAGNOSIS — R29.898 WEAKNESS OF LEFT HIP: Primary | ICD-10-CM

## 2021-12-10 PROCEDURE — 97140 MANUAL THERAPY 1/> REGIONS: CPT | Performed by: PHYSICAL THERAPIST

## 2021-12-10 PROCEDURE — 97012 MECHANICAL TRACTION THERAPY: CPT | Performed by: PHYSICAL THERAPIST

## 2021-12-10 PROCEDURE — 97110 THERAPEUTIC EXERCISES: CPT | Performed by: PHYSICAL THERAPIST

## 2021-12-10 NOTE — PROGRESS NOTES
Physical Therapy Daily Treatment Note      Patient: Panda LEON Range   : 1960  Referring practitioner: MADELYN Mendiola  Date of Initial Visit: Type: THERAPY  Noted: 11/3/2021  Today's Date: 12/10/2021  Patient seen for 6 sessions           Subjective Questionnaire:       Subjective Evaluation    History of Present Illness    Subjective comment: Pt reports not having any pain currently.  Pt describes having L low back, buttocks pain that goes down the leg to the knee and occassionaly the calf.  Pt reports pain with sitting, standing and prolonged sitting.       Objective   See Exercise, Manual, and Modality Logs for complete treatment.       Assessment & Plan     Assessment    Assessment details: Pt tolerated therapeutic exercise well and reported long axis traction feels good.        Visit Diagnoses:    ICD-10-CM ICD-9-CM   1. Weakness of left hip  R29.898 729.89   2. Hip pain  M25.559 719.45                  Timed:  Manual Therapy:    8     mins  88143;  Therapeutic Exercise:    20     mins  42979;     Neuromuscular Teresita:        mins  67440;    Therapeutic Activity:          mins  57177;     Gait Training:           mins  93115;     Ultrasound:          mins  91476;    Electrical Stimulation:         mins  39461 ( );  Aquatic Therapy          mins  34517    Untimed:  Electrical Stimulation:         mins  87758 ( );  Mechanical Traction:    15     mins  35206;     Timed Treatment:   28   mins   Total Treatment:     43   mins    Electronically signed    Gertrude Madrid PTA  Physical Therapist Assistant    [facility] license: [id number]

## 2021-12-14 RX ORDER — SODIUM FLUORIDE 5 MG/ML
PASTE, DENTIFRICE DENTAL
COMMUNITY
Start: 2021-10-22 | End: 2022-07-20

## 2021-12-15 ENCOUNTER — OFFICE VISIT (OUTPATIENT)
Dept: FAMILY MEDICINE CLINIC | Facility: CLINIC | Age: 61
End: 2021-12-15

## 2021-12-15 ENCOUNTER — LAB (OUTPATIENT)
Dept: LAB | Facility: HOSPITAL | Age: 61
End: 2021-12-15

## 2021-12-15 ENCOUNTER — HOSPITAL ENCOUNTER (OUTPATIENT)
Dept: GENERAL RADIOLOGY | Facility: HOSPITAL | Age: 61
Discharge: HOME | End: 2021-12-15

## 2021-12-15 VITALS
BODY MASS INDEX: 25.76 KG/M2 | WEIGHT: 140 LBS | OXYGEN SATURATION: 98 % | HEART RATE: 64 BPM | DIASTOLIC BLOOD PRESSURE: 64 MMHG | SYSTOLIC BLOOD PRESSURE: 110 MMHG | HEIGHT: 62 IN | TEMPERATURE: 97.7 F

## 2021-12-15 DIAGNOSIS — M79.644 PAIN OF FINGER OF RIGHT HAND: ICD-10-CM

## 2021-12-15 DIAGNOSIS — M79.644 PAIN OF FINGER OF RIGHT HAND: Primary | ICD-10-CM

## 2021-12-15 LAB
CRP SERPL-MCNC: <0.3 MG/DL (ref 0–0.5)
URATE SERPL-MCNC: 5.6 MG/DL (ref 2.4–5.7)

## 2021-12-15 PROCEDURE — 86225 DNA ANTIBODY NATIVE: CPT

## 2021-12-15 PROCEDURE — 36415 COLL VENOUS BLD VENIPUNCTURE: CPT

## 2021-12-15 PROCEDURE — 99213 OFFICE O/P EST LOW 20 MIN: CPT | Performed by: PHYSICIAN ASSISTANT

## 2021-12-15 PROCEDURE — 84550 ASSAY OF BLOOD/URIC ACID: CPT

## 2021-12-15 PROCEDURE — 86140 C-REACTIVE PROTEIN: CPT

## 2021-12-15 PROCEDURE — 86431 RHEUMATOID FACTOR QUANT: CPT

## 2021-12-15 PROCEDURE — 86038 ANTINUCLEAR ANTIBODIES: CPT

## 2021-12-15 PROCEDURE — 73130 X-RAY EXAM OF HAND: CPT

## 2021-12-15 NOTE — PROGRESS NOTES
Chief Complaint  Chief Complaint   Patient presents with   • Hand Pain     Right       Subjective          Tavictor manuel Rinaldi presents to [facility]  History of Present Illness     Patient complaining of right pointer finger pain, swelling X 4 weeks, NKI.  Patient states she thinks symptoms are due to repetitive motions like writing, chopping and painting.  Patient describes pain as constant, worse with chopping, sewing, writing, sometimes feels warm to touch, burning pain, cutting pain.  Patient rates pain at 5-6/10. Patient used tape to help stabilize which made more comfortable. History of similar symtpoms on right thumb years ago. Right hand dominant. No use of OTC NSIADS or Tylenol.    Medical History: has a past medical history of Abnormal bone density screening (10/15/2014), Acid reflux, Arthritis (07/15/2021), Heart disease, Hyperlipemia, Osteoporosis (10/15/2014), Palpitations, Seasonal allergies, and Shoulder pain.   Surgical History: has a past surgical history that includes Colonoscopy (10/2015); Shoulder surgery (Right, 2014); Esophagogastroduodenoscopy (N/A, 7/15/2021); and Colonoscopy (N/A, 7/15/2021).   Family History: family history includes Breast cancer in her sister and another family member; Colon cancer in her mother; Heart attack in her mother; Heart disease in her father; Stroke in an other family member.   Social History: reports that she has never smoked. She has never used smokeless tobacco. She reports that she does not drink alcohol and does not use drugs.    Allergies: Hydrocodone-acetaminophen, Tramadol, Azithromycin, and Latex    Health Maintenance Due   Topic Date Due   • TDAP/TD VACCINES (1 - Tdap) Never done   • INFLUENZA VACCINE  08/01/2021   • COVID-19 Vaccine (3 - Booster for Pfizer series) 10/06/2021       Immunization History   Administered Date(s) Administered   • COVID-19 (PFIZER) 03/16/2021, 04/06/2021   • Flu Vaccine Quad PF >18YRS 10/01/2020   •  "Influenza, Unspecified 10/01/2020   • Shingrix 05/25/2021, 09/08/2021       Objective     Vitals:    12/15/21 1124   BP: 110/64   BP Location: Left arm   Patient Position: Sitting   Pulse: 64   Temp: 97.7 °F (36.5 °C)   TempSrc: Oral   SpO2: 98%   Weight: 63.5 kg (140 lb)   Height: 157.5 cm (62\")     Body mass index is 25.61 kg/m².       Physical Exam  Vitals and nursing note reviewed.   Constitutional:       Appearance: Normal appearance.   HENT:      Head: Normocephalic and atraumatic.   Neck:      Vascular: No carotid bruit.      Comments: Thyroid : gland size normal, nontender, no nodules or masses present on palpation   Cardiovascular:      Rate and Rhythm: Normal rate and regular rhythm.      Pulses: Normal pulses.      Heart sounds: Normal heart sounds.   Pulmonary:      Effort: Pulmonary effort is normal.      Breath sounds: Normal breath sounds.   Musculoskeletal:      Right hand: Swelling and tenderness present. Decreased range of motion.        Arms:       Cervical back: Neck supple. No tenderness.   Lymphadenopathy:      Cervical: No cervical adenopathy.   Neurological:      Mental Status: She is alert.   Psychiatric:         Mood and Affect: Mood normal.         Behavior: Behavior normal.           Result Review :                           Assessment and Plan      Diagnoses and all orders for this visit:    1. Pain of finger of right hand (Primary)  Comments:  New problem: check xray and labs; trial of Voltaren gel 4 times daily to affected area as needed. Follow up if no improvement.  Orders:  -     XR Hand 3+ View Right; Future  -     Uric acid; Future  -     Rheumatoid factor; Future  -     C-reactive protein; Future  -     CHANDRIKA; Future            Follow Up     Return if symptoms worsen or fail to improve.    Patient was given instructions and counseling regarding her condition or for health maintenance advice. Please see specific information pulled into the AVS if appropriate.        "

## 2021-12-16 LAB
CHROMATIN AB SERPL-ACNC: <10 IU/ML (ref 0–14)
DSDNA IGG SERPL IA-ACNC: NEGATIVE [IU]/ML
NUCLEAR IGG SER IA-RTO: NEGATIVE

## 2021-12-17 ENCOUNTER — TREATMENT (OUTPATIENT)
Dept: PHYSICAL THERAPY | Facility: CLINIC | Age: 61
End: 2021-12-17

## 2021-12-17 DIAGNOSIS — M25.559 HIP PAIN: ICD-10-CM

## 2021-12-17 DIAGNOSIS — R29.898 WEAKNESS OF LEFT HIP: Primary | ICD-10-CM

## 2021-12-17 PROCEDURE — 97012 MECHANICAL TRACTION THERAPY: CPT | Performed by: PHYSICAL THERAPIST

## 2021-12-17 PROCEDURE — 97110 THERAPEUTIC EXERCISES: CPT | Performed by: PHYSICAL THERAPIST

## 2021-12-17 PROCEDURE — 97140 MANUAL THERAPY 1/> REGIONS: CPT | Performed by: PHYSICAL THERAPIST

## 2021-12-17 NOTE — PROGRESS NOTES
Physical Therapy Daily Treatment Note      Patient: Panda LEON Range   : 1960  Referring practitioner: MADELYN Mendiola  Date of Initial Visit: Type: THERAPY  Noted: 11/3/2021  Today's Date: 2021  Patient seen for 7 sessions           Subjective Questionnaire:       Subjective Evaluation    History of Present Illness    Subjective comment: Pt reports 3/10 L ilium pain currently and at times is B and at times goes down L buttocks and posterior leg.  Pt was asked if her pain ever goes away and patient responded she has had this pain for ten years and it never goes away.       Objective   See Exercise, Manual, and Modality Logs for complete treatment.       Assessment & Plan     Assessment    Assessment details: Upon attempting R side lying L IT band stretch pt pulled back LLE and stood up placing her fist at piriformis.  Performed soft tissue release to piriformis and side lying hip flexor stretch and pt reported feeling much better.        Visit Diagnoses:    ICD-10-CM ICD-9-CM   1. Weakness of left hip  R29.898 729.89   2. Hip pain  M25.559 719.45       Progress per Plan of Care and Progress strengthening /stabilization /functional activity           Timed:  Manual Therapy:    8     mins  20452;  Therapeutic Exercise:    16     mins  42624;     Neuromuscular Teresita:        mins  28164;    Therapeutic Activity:          mins  72716;     Gait Training:           mins  90038;     Ultrasound:          mins  84951;    Electrical Stimulation:         mins  74448 ( );  Aquatic Therapy          mins  49979    Untimed:  Electrical Stimulation:         mins  20127 ( );  Mechanical Traction:    15     mins  58826;     Timed Treatment:   24   mins   Total Treatment:     39   mins    Electronically signed    Gertrude Madrid PTA  Physical Therapist Assistant    [facility] license: [id number]

## 2021-12-29 ENCOUNTER — TREATMENT (OUTPATIENT)
Dept: PHYSICAL THERAPY | Facility: CLINIC | Age: 61
End: 2021-12-29

## 2021-12-29 DIAGNOSIS — M25.559 HIP PAIN: ICD-10-CM

## 2021-12-29 DIAGNOSIS — R29.898 WEAKNESS OF LEFT HIP: Primary | ICD-10-CM

## 2021-12-29 PROCEDURE — 97110 THERAPEUTIC EXERCISES: CPT | Performed by: PHYSICAL THERAPIST

## 2021-12-29 NOTE — PROGRESS NOTES
PROGRESS NOTE    Patient: Panda LEON Range   : 1960  Diagnosis/ICD-10 Code:  Weakness of left hip [R29.898]  Referring practitioner: MADELYN Mendiola  Date of Initial Visit: Type: THERAPY  Noted: 11/3/2021  Today's Date: 2021  Patient seen for 8 sessions    Subjective:   Panda Range reports: She is getting better overall and more mobility. Her pain ranges from 3-5/10.  Clinical Progress: improved  Home Program Compliance: Yes  Treatment has included: therapeutic exercise, neuromuscular re-education, manual therapy and therapeutic activity    Subjective   Objective          Static Posture     Comments  When seated with back unsupported, pt leans towards her R side with her arms supported to off-weight her L side. When sitting on chair with back supported. Pt sits at the edge of the chair and leans back with legs extended.    Active Range of Motion     Additional Active Range of Motion Details  Lumbar  Active Range              Flexion   85%   Extension  70%         Left Right   Sidebending  80% 80%   Rotation  80% 80%         Strength/Myotome Testing     Left Hip   Planes of Motion   Flexion: 4-  Abduction: 4-  Adduction: 4-    Right Hip   Planes of Motion   Flexion: 4-  Abduction: 4  Adduction: 4    Left Knee   Flexion: 4-  Extension: 4-    Right Knee   Flexion: 4  Extension: 4    Ambulation     Observational Gait   Decreased walking speed and stride length.     Functional Assessment     Comments  Instructions:  Please select the level of difficulty you have for each activity below.      Able to do without      Able to do with      Able to do with         Able to do with      Unable to do      N/A    any difficulty         little difficulty         moderate difficulty    much difficulty    6.  Bending  1  (*)          2  (*)               3  (*)                        4  (x)                     5  (*)              9  (*)  /Stooping  9.  Standing  1  (*)          2  (*)               3  (x)                         4  (*)                     5  (*)              9  (*)  10. Walking-  1  (*)          2  (x)               3  (*)                        4  (*)                     5  (*)              9  (*)  short distances    Total Score: (*) (all items) (9) (3 items) (*)  (1 item)    % Limitation   All Items  3 Items  1 Item    (*) 0%   (Score of 22)   (Score of 3)   (Score of 1)  (*) 1-19%    (Score of 23-40)  (Score of 4-5)   (Score of 2)  (*) 20-39%  (Score of 41-58)  (Score of 6-7)   (Score of 2)  (x) 40-59%  (Score of 59-76)  (Score of 8-9)   (Score of 3)  (*) 60-79%  (Score of 77-94)  (Score of 10-11)  (Score of 3)  (*) 80-99%  (Score of )  (Score of 12-14)  (Score of 4)  (*) 100%   (Score of 110)   (Score of 15)   (Score of 5)      Assessment & Plan     Assessment  Functional Limitations: walking and standing  Assessment details: Pt has made improvements in her lumbar ROM and strength. She is ready for discharge to an [facility].    Goals  Plan Goals: LOW BACK and HIP PROBLEMS:    1. The patient complains of low back and hip pain.  LTG 1: 8 weeks:  The patient will report a pain rating of 2/10 or better in order to improve  tolerance to activities of daily living and improve sleep quality.  STATUS:  Not met, progressing  STG 1a: 4 weeks:  The patient will report a pain rating of 4/10 or better.  STATUS:  Not met, progressing    TREATMENT:  Therapeutic exercises, manual therapy, aquatic therapy, home exercise   instruction, and modalities as needed for pain to include:  electrical stimulation, moist heat, ice,   ultrasound, and diathermy.      2. The patient demonstrates weakness of the L hip.  LTG 2: 8 weeks:  The patient will demonstrate 4+ /5 strength for L hip flexion, abduction,  and extension in order to improve hip stability.  STATUS:  Not met, progressing    STG 2a: 4 weeks:  The patient will demonstrate 4 /5 strength for L hip flexion, abduction,  and extension.  STATUS:  Not met, progressing  TREATMENT:  Therapeutic exercises, manual therapy, aquatic therapy, home exercise instruction,  and modalities as needed for pain to include:  electrical stimulation, moist heat, ice, ultrasound, and   diathermy.      3. The patient has gait dysfunction.  LTG 3: 8 weeks:  The patient will ambulate without assistive device, independently, for   community distances with minimal limp to the L lower extremity in order to improve mobility and allow   patient to perform activities such as grocery shopping with greater ease.  STATUS:  met  TREATMENT: Gait training, aquatic therapy, therapeutic exercise, and home exercise instruction.      Plan  Therapy options: will be seen for skilled therapy services  Planned therapy interventions: manual therapy, strengthening, stretching, gait training and home exercise program  Frequency: 2x week  Duration in visits: 20  Duration in weeks: 10  Plan details: Manual therapy, therapeutic exercises, pt education and HEP.      Progress toward previous goals: Partially Met    Recommendations: Continue as planned  Prognosis to achieve goals: good    PT Signature: Hawa Borja, PT    Electronically Signed 2021    [place] License: [id number]    Based upon review of the patient's progress and continued therapy plan, it is my medical opinion that Panda Rinaldi should continue physical therapy treatment at [facility]  [address]   [place] [address] [phone].    Timed:         Manual Therapy:    0     mins  47579;     Therapeutic Exercise:    15     mins  10806;     Neuromuscular Teresita:    0    mins  46570;    Therapeutic Activity:     0     mins  17092;     Gait Trainin     mins  19215;     Ultrasound:     0     mins  61896;    Ionto                               0    mins   92752  Aquatic                          0     mins 53431      Un-Timed:  Electrical Stimulation:    0     mins  18176 ( );  Dry Needling     0     mins  self-pay  Traction     0     mins 90737      Timed Treatment:   15   mins   Total Treatment:     15   mins

## 2022-01-04 ENCOUNTER — TREATMENT (OUTPATIENT)
Dept: PHYSICAL THERAPY | Facility: CLINIC | Age: 62
End: 2022-01-04

## 2022-01-04 DIAGNOSIS — R29.898 WEAKNESS OF LEFT HIP: Primary | ICD-10-CM

## 2022-01-04 DIAGNOSIS — M25.559 HIP PAIN: ICD-10-CM

## 2022-01-04 PROCEDURE — 97110 THERAPEUTIC EXERCISES: CPT | Performed by: PHYSICAL THERAPIST

## 2022-01-04 NOTE — PROGRESS NOTES
"Physical Therapy Daily Treatment Note      Patient: Panda LEON Range   : 1960  Referring practitioner: MADELYN Mendiola  Date of Initial Visit: Type: THERAPY  Noted: 11/3/2021  Today's Date: 2022  Patient seen for 9 sessions           Subjective  Tae Range reports: \"I feel pretty good, been doing the exercises at home. Maybe -3/10.\"     Discussed with  pt that JAMESON Borja DPT advised PTA that concentration is on strengthening now.     Objective   See Exercise, Manual, and Modality Logs for complete treatment.       Assessment/Plan  Pt did well with her exercises. VC and tactile prompts to assure PPT performance correctly. Panda commented her back felt better after session.    Visit Diagnoses:    ICD-10-CM ICD-9-CM   1. Weakness of left hip  R29.898 729.89   2. Hip pain  M25.559 719.45       Progress per Plan of Care and Progress strengthening /stabilization /functional activity           Timed:  Manual Therapy:         mins  12084;  Therapeutic Exercise:    28     mins  92090;     Neuromuscular Teresita:        mins  87169;    Therapeutic Activity:          mins  73829;     Gait Training:           mins  93749;     Ultrasound:          mins  99881;    Electrical Stimulation:         mins  56346 ( );  Aquatics  __   mins   15929    Untimed:  Electrical Stimulation:         mins  50387 ( );  Mechanical Traction:         mins  68956;     Timed Treatment:   28   mins   Total Treatment:     28   mins    Electronically Signed:  Doreen Ordonez PTA  Physical Therapist Assistant    [place] PTA license [id number]            "

## 2022-01-05 DIAGNOSIS — R00.2 PALPITATIONS: ICD-10-CM

## 2022-01-05 DIAGNOSIS — E78.5 HYPERLIPIDEMIA, UNSPECIFIED HYPERLIPIDEMIA TYPE: ICD-10-CM

## 2022-01-05 RX ORDER — ATORVASTATIN CALCIUM 20 MG/1
20 TABLET, FILM COATED ORAL NIGHTLY
Qty: 90 TABLET | Refills: 1 | Status: SHIPPED | OUTPATIENT
Start: 2022-01-05 | End: 2022-04-08 | Stop reason: SDUPTHER

## 2022-01-05 RX ORDER — METOPROLOL SUCCINATE 25 MG/1
25 TABLET, EXTENDED RELEASE ORAL DAILY
Qty: 90 TABLET | Refills: 1 | Status: SHIPPED | OUTPATIENT
Start: 2022-01-05 | End: 2022-04-08 | Stop reason: SDUPTHER

## 2022-01-05 NOTE — TELEPHONE ENCOUNTER
Caller: FATEMEH SOLORZANO    Relationship: Emergency Contact    Best call back number: [phone]     Requested Prescriptions:   Requested Prescriptions     Pending Prescriptions Disp Refills   • metoprolol succinate XL (TOPROL-XL) 25 MG 24 hr tablet 90 tablet 0     Sig: Take 1 tablet by mouth Daily.   • atorvastatin (LIPITOR) 20 MG tablet 90 tablet 0     Sig: Take 1 tablet by mouth Every Night.        Pharmacy where request should be sent: [facility] [place] [facility] [place] [place]  [address] [phone] [facility] [phone] FX     Additional details provided by patient: FATEMEH STATES PATIENT IS COMPLETELY OUT OF THIS MEDICATION     Does the patient have less than a 3 day supply:  [x] Yes  [] No    Celia Urbina Rep   01/05/22 10:58 EST

## 2022-01-06 ENCOUNTER — TREATMENT (OUTPATIENT)
Dept: PHYSICAL THERAPY | Facility: CLINIC | Age: 62
End: 2022-01-06

## 2022-01-06 DIAGNOSIS — R29.898 WEAKNESS OF LEFT HIP: Primary | ICD-10-CM

## 2022-01-06 DIAGNOSIS — M25.559 HIP PAIN: ICD-10-CM

## 2022-01-06 PROCEDURE — 97110 THERAPEUTIC EXERCISES: CPT | Performed by: PHYSICAL THERAPIST

## 2022-01-06 PROCEDURE — 97530 THERAPEUTIC ACTIVITIES: CPT | Performed by: PHYSICAL THERAPIST

## 2022-01-06 NOTE — PROGRESS NOTES
"Physical Therapy Daily Treatment Note      Patient: Panda Rinaldi   : 1960  Referring practitioner: MADELYN Mendiola  Date of Initial Visit: Type: THERAPY  Noted: 11/3/2021  Today's Date: 2022  Patient seen for 10 sessions           Subjective  Tae Range reports: at arrival, pt stated she feels the same. \"I am going to only come one time a week because I am babysitting my grandkids.\"  Discussed seeing pt one addt time next week and discharge at that time.    Objective   See Exercise, Manual, and Modality Logs for complete treatment.       Assessment/Plan   Advancement in strength evident with increased resistance tolerated during session. Anticipate discharge to HEP next session as pt sees the evaluating therapist.       Visit Diagnoses:    ICD-10-CM ICD-9-CM   1. Weakness of left hip  R29.898 729.89   2. Hip pain  M25.559 719.45       Anticipate DC next Visit           Timed:  Manual Therapy:         mins  98741;  Therapeutic Exercise:    16     mins  34811;     Neuromuscular Teresita:        mins  85979;    Therapeutic Activity:     14     mins  11936;     Gait Training:           mins  87124;     Ultrasound:          mins  23733;    Electrical Stimulation:         mins  38207 ( );  Aquatics  __   mins   71175    Untimed:  Electrical Stimulation:         mins  70873 ( );  Mechanical Traction:         mins  55780;     Timed Treatment:   30   mins   Total Treatment:     30   mins    Electronically Signed:  Doreen Ordonez PTA  Physical Therapist Assistant    [place] PTA license [id number]            "

## 2022-01-12 ENCOUNTER — DOCUMENTATION (OUTPATIENT)
Dept: PHYSICAL THERAPY | Facility: CLINIC | Age: 62
End: 2022-01-12

## 2022-01-12 DIAGNOSIS — M25.559 HIP PAIN: Primary | ICD-10-CM

## 2022-01-12 DIAGNOSIS — R29.898 WEAKNESS OF LEFT HIP: ICD-10-CM

## 2022-01-12 NOTE — PROGRESS NOTES
Discharge Summary  Discharge Summary from Physical Therapy Report    Patient Information  Panda N Range  1960     Comment: Pt is being discharged as she made significant progress and can manage her symptoms with exercises at home.     Goals: Partially Met    Visit Diagnoses:    ICD-10-CM ICD-9-CM   1. Hip pain  M25.559 719.45   2. Weakness of left hip  R29.898 729.89       Discharge Plan: Continue with current home exercise program as instructed    Date of Discharge 1/12/22        Hawa Borja PT  Physical Therapist    Electronically Signed 1/12/2022    [place] License: [id number]

## 2022-03-02 ENCOUNTER — TELEPHONE (OUTPATIENT)
Dept: FAMILY MEDICINE CLINIC | Facility: CLINIC | Age: 62
End: 2022-03-02

## 2022-03-02 ENCOUNTER — OFFICE VISIT (OUTPATIENT)
Dept: FAMILY MEDICINE CLINIC | Facility: CLINIC | Age: 62
End: 2022-03-02

## 2022-03-02 ENCOUNTER — HOSPITAL ENCOUNTER (OUTPATIENT)
Dept: GENERAL RADIOLOGY | Facility: HOSPITAL | Age: 62
Discharge: HOME | End: 2022-03-02

## 2022-03-02 VITALS
DIASTOLIC BLOOD PRESSURE: 62 MMHG | SYSTOLIC BLOOD PRESSURE: 103 MMHG | HEIGHT: 62 IN | WEIGHT: 137 LBS | HEART RATE: 98 BPM | BODY MASS INDEX: 25.21 KG/M2 | OXYGEN SATURATION: 98 %

## 2022-03-02 DIAGNOSIS — M54.50 LUMBAR PAIN: ICD-10-CM
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
## 2022-03-02 DIAGNOSIS — S39.92XA INJURY OF COCCYX, INITIAL ENCOUNTER: Primary | ICD-10-CM

## 2022-03-02 PROCEDURE — 72220 X-RAY EXAM SACRUM TAILBONE: CPT

## 2022-03-02 PROCEDURE — 72110 X-RAY EXAM L-2 SPINE 4/>VWS: CPT

## 2022-03-02 PROCEDURE — 99213 OFFICE O/P EST LOW 20 MIN: CPT | Performed by: NURSE PRACTITIONER

## 2022-03-02 RX ORDER — IBUPROFEN 800 MG/1
800 TABLET ORAL EVERY 6 HOURS PRN
Qty: 90 TABLET | Refills: 0 | Status: SHIPPED | OUTPATIENT
Start: 2022-03-02

## 2022-03-02 NOTE — TELEPHONE ENCOUNTER
----- Message from MADELYN Mendiola sent at 3/2/2022  2:09 PM EST -----  No definate acute abnormality recommend additional imaging if pain persisting MRI of lumbar spine can be ordered if pt wishes

## 2022-03-02 NOTE — PROGRESS NOTES
Chief Complaint  Tailbone Pain and Back Pain    Subjective            Tae N Range presents to [facility]  Pt here today c/o low back and tailbone pain. Pt is unable to sit down fully, states has to lay on the side.  She reports a hx of chronic lumbar pain.    Pt fell down some steps two weeks ago and it has hurt since the fall.     Pt has been taking ibuprofen as needed and it is working but she requests a refill.        Past Medical History:   Diagnosis Date   • Abnormal bone density screening 10/15/2014    osteoporosis- tried fosamax in the past   • Acid reflux    • Arthritis 07/15/2021    left hip pain. risk for falls.    • Heart disease    • Hyperlipemia    • Osteoporosis 10/15/2014   • Palpitations    • Seasonal allergies    • Shoulder pain        Allergies   Allergen Reactions   • Hydrocodone-Acetaminophen Shortness Of Breath   • Tramadol Shortness Of Breath   • Azithromycin Unknown - Low Severity   • Latex Unknown - Low Severity        Past Surgical History:   Procedure Laterality Date   • COLONOSCOPY  10/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    • COLONOSCOPY N/A 7/15/2021    Procedure: COLONOSCOPY with polypectomy/snare;  Surgeon: Adelfo Story MD;  Location: [facility] ENDOSCOPY;  Service: General;  Laterality: N/A;  colon polyps   • ENDOSCOPY N/A 7/15/2021    Procedure: ESOPHAGOGASTRODUODENOSCOPY with biopsies;  Surgeon: Adelfo Story MD;  Location: [facility] ENDOSCOPY;  Service: General;  Laterality: N/A;  gastric nodule   • SHOULDER SURGERY Right 2014    [facility]         Social History     Tobacco Use   • Smoking status: Never Smoker   • Smokeless tobacco: Never Used   Substance Use Topics   • Alcohol use: Never       Family History   Problem Relation Age of Onset   • Heart attack Mother    • Colon cancer Mother    • Heart disease Father    • Breast cancer Sister    • Stroke Other    • Breast cancer Other    • Malig Hyperthermia Neg Hx         Current Outpatient Medications on File Prior to  "Visit   Medication Sig   • Acetaminophen 325 MG capsule acetaminophen 325 mg oral capsule take 2 capsules by oral route daily   Active   • atorvastatin (LIPITOR) 20 MG tablet Take 1 tablet by mouth Every Night.   • cetirizine (zyrTEC) 10 MG tablet Take 1 tablet by mouth Daily.   • metoprolol succinate XL (TOPROL-XL) 25 MG 24 hr tablet Take 1 tablet by mouth Daily.   • PreviDent 5000 Plus 1.1 % cream    • [DISCONTINUED] ibuprofen (ADVIL,MOTRIN) 800 MG tablet Take 1 tablet by mouth Every 6 (Six) Hours As Needed for Mild Pain .     No current facility-administered medications on file prior to visit.       Health Maintenance Due   Topic Date Due   • TDAP/TD VACCINES (1 - Tdap) Never done   • INFLUENZA VACCINE  08/01/2021       Objective     /62   Pulse 98   Ht 157.5 cm (62\")   Wt 62.1 kg (137 lb)   SpO2 98%   BMI 25.06 kg/m²       Physical Exam  Constitutional:       General: She is not in acute distress.     Appearance: Normal appearance. She is not ill-appearing.   HENT:      Head: Normocephalic and atraumatic.      Mouth/Throat:      Pharynx: No oropharyngeal exudate or posterior oropharyngeal erythema.   Cardiovascular:      Rate and Rhythm: Normal rate and regular rhythm.      Heart sounds: Normal heart sounds. No murmur heard.      Pulmonary:      Effort: Pulmonary effort is normal. No respiratory distress.      Breath sounds: Normal breath sounds.   Chest:      Chest wall: No tenderness.   Abdominal:      General: There is no distension.      Palpations: There is no mass.      Tenderness: There is no abdominal tenderness. There is no guarding.   Musculoskeletal:         General: No swelling. Normal range of motion.      Cervical back: Normal range of motion and neck supple.      Lumbar back: Tenderness present.      Comments: Pain upon palpation to lumbar area increased in coccyx region   Skin:     General: Skin is warm and dry.      Findings: No rash.   Neurological:      General: No focal deficit " present.      Mental Status: She is alert and oriented to person, place, and time. Mental status is at baseline.      Gait: Gait normal.   Psychiatric:         Mood and Affect: Mood normal.         Behavior: Behavior normal.         Thought Content: Thought content normal.         Judgment: Judgment normal.           Result Review :                           Assessment and Plan        Diagnoses and all orders for this visit:    1. Injury of coccyx, initial encounter (Primary)  -     XR Sacrum & Coccyx (In Office)  -     ibuprofen (ADVIL,MOTRIN) 800 MG tablet; Take 1 tablet by mouth Every 6 (Six) Hours As Needed for Mild Pain .  Dispense: 90 tablet; Refill: 0    2. Fall, initial encounter  -     XR Sacrum & Coccyx (In Office)  -     XR Spine Lumbar 4+ View; Future    3. Lumbar pain  -     XR Spine Lumbar 4+ View; Future  -     ibuprofen (ADVIL,MOTRIN) 800 MG tablet; Take 1 tablet by mouth Every 6 (Six) Hours As Needed for Mild Pain .  Dispense: 90 tablet; Refill: 0              Follow Up {Instructions Charge Capture  Follow-up Communications :23}    Return if symptoms worsen or fail to improve.    Patient was given instructions and counseling regarding her condition or for health maintenance advice. Please see specific information pulled into the AVS if appropriate.     Panda Rinaldi  reports that she has never smoked. She has never used smokeless tobacco..

## 2022-04-08 DIAGNOSIS — R00.2 PALPITATIONS: ICD-10-CM

## 2022-04-08 DIAGNOSIS — J30.2 SEASONAL ALLERGIC RHINITIS, UNSPECIFIED TRIGGER: ICD-10-CM

## 2022-04-08 DIAGNOSIS — E78.5 HYPERLIPIDEMIA, UNSPECIFIED HYPERLIPIDEMIA TYPE: ICD-10-CM

## 2022-04-08 RX ORDER — METOPROLOL SUCCINATE 25 MG/1
25 TABLET, EXTENDED RELEASE ORAL DAILY
Qty: 90 TABLET | Refills: 0 | Status: SHIPPED | OUTPATIENT
Start: 2022-04-08 | End: 2023-01-19 | Stop reason: SDUPTHER

## 2022-04-08 RX ORDER — CETIRIZINE HYDROCHLORIDE 10 MG/1
10 TABLET ORAL DAILY
Qty: 90 TABLET | Refills: 0 | Status: SHIPPED | OUTPATIENT
Start: 2022-04-08 | End: 2022-04-11 | Stop reason: SDUPTHER

## 2022-04-08 RX ORDER — ATORVASTATIN CALCIUM 20 MG/1
20 TABLET, FILM COATED ORAL NIGHTLY
Qty: 90 TABLET | Refills: 0 | Status: SHIPPED | OUTPATIENT
Start: 2022-04-08 | End: 2023-01-19

## 2022-04-11 DIAGNOSIS — J30.2 SEASONAL ALLERGIC RHINITIS, UNSPECIFIED TRIGGER: ICD-10-CM

## 2022-04-11 RX ORDER — CETIRIZINE HYDROCHLORIDE 10 MG/1
10 TABLET ORAL DAILY
Qty: 90 TABLET | Refills: 0 | Status: SHIPPED | OUTPATIENT
Start: 2022-04-11 | End: 2022-10-13 | Stop reason: SDUPTHER

## 2022-04-11 RX ORDER — FLUTICASONE PROPIONATE 50 MCG
2 SPRAY, SUSPENSION (ML) NASAL DAILY
COMMUNITY
End: 2022-04-11 | Stop reason: SDUPTHER

## 2022-04-11 RX ORDER — FLUTICASONE PROPIONATE 50 MCG
2 SPRAY, SUSPENSION (ML) NASAL DAILY
Qty: 16 G | Refills: 2 | Status: SHIPPED | OUTPATIENT
Start: 2022-04-11

## 2022-04-11 NOTE — TELEPHONE ENCOUNTER
Caller: Panda Rinaldi    Relationship: Self    Best call back number: [phone]     Requested Prescriptions:   Requested Prescriptions     Pending Prescriptions Disp Refills   • cetirizine (zyrTEC) 10 MG tablet 90 tablet 0     Sig: Take 1 tablet by mouth Daily.      ALSO NEEDS FLUTICASONE PROPIONATE CALLED IN    Pharmacy where request should be sent: [facility] [place] [facility] [place] - [address] [phone] [facility] [phone] [facility]     Additional details provided by patient: COMPLETELY OUT    PLEASE CALL WHEN PRESCRIPTION HAS BEEN SENT    Does the patient have less than a 3 day supply:  [x] Yes  [] No    Celia GALLARDO Rep   04/11/22 13:06 EDT

## 2022-04-27 ENCOUNTER — LAB (OUTPATIENT)
Dept: LAB | Facility: HOSPITAL | Age: 62
End: 2022-04-27

## 2022-04-27 ENCOUNTER — HOSPITAL ENCOUNTER (OUTPATIENT)
Dept: GENERAL RADIOLOGY | Facility: HOSPITAL | Age: 62
Discharge: HOME | End: 2022-04-27

## 2022-04-27 ENCOUNTER — OFFICE VISIT (OUTPATIENT)
Dept: FAMILY MEDICINE CLINIC | Facility: CLINIC | Age: 62
End: 2022-04-27

## 2022-04-27 ENCOUNTER — TELEPHONE (OUTPATIENT)
Dept: FAMILY MEDICINE CLINIC | Facility: CLINIC | Age: 62
End: 2022-04-27

## 2022-04-27 VITALS
HEIGHT: 64 IN | HEART RATE: 60 BPM | SYSTOLIC BLOOD PRESSURE: 111 MMHG | DIASTOLIC BLOOD PRESSURE: 69 MMHG | OXYGEN SATURATION: 97 % | BODY MASS INDEX: 23.56 KG/M2 | WEIGHT: 138 LBS

## 2022-04-27 DIAGNOSIS — D64.9 ANEMIA, UNSPECIFIED TYPE: ICD-10-CM

## 2022-04-27 DIAGNOSIS — M79.642 LEFT HAND PAIN: ICD-10-CM

## 2022-04-27 DIAGNOSIS — E55.9 VITAMIN D DEFICIENCY: ICD-10-CM

## 2022-04-27 DIAGNOSIS — E78.2 MIXED HYPERLIPIDEMIA: Primary | ICD-10-CM

## 2022-04-27 DIAGNOSIS — R00.2 PALPITATIONS: ICD-10-CM

## 2022-04-27 DIAGNOSIS — R53.83 FATIGUE, UNSPECIFIED TYPE: ICD-10-CM

## 2022-04-27 DIAGNOSIS — E78.2 MIXED HYPERLIPIDEMIA: ICD-10-CM

## 2022-04-27 DIAGNOSIS — M19.049 PRIMARY OSTEOARTHRITIS OF HAND, UNSPECIFIED LATERALITY: Primary | ICD-10-CM

## 2022-04-27 LAB
25(OH)D3 SERPL-MCNC: 22.4 NG/ML (ref 30–100)
ALBUMIN SERPL-MCNC: 4.4 G/DL (ref 3.5–5.2)
ALBUMIN/GLOB SERPL: 1.5 G/DL
ALP SERPL-CCNC: 68 U/L (ref 39–117)
ALT SERPL W P-5'-P-CCNC: 70 U/L (ref 1–33)
ANION GAP SERPL CALCULATED.3IONS-SCNC: 9.9 MMOL/L (ref 5–15)
AST SERPL-CCNC: 42 U/L (ref 1–32)
BASOPHILS # BLD AUTO: 0.04 10*3/MM3 (ref 0–0.2)
BASOPHILS NFR BLD AUTO: 0.7 % (ref 0–1.5)
BILIRUB SERPL-MCNC: 0.6 MG/DL (ref 0–1.2)
BUN SERPL-MCNC: 12 MG/DL (ref 8–23)
BUN/CREAT SERPL: 16.4 (ref 7–25)
CALCIUM SPEC-SCNC: 9.6 MG/DL (ref 8.6–10.5)
CHLORIDE SERPL-SCNC: 104 MMOL/L (ref 98–107)
CHOLEST SERPL-MCNC: 167 MG/DL (ref 0–200)
CO2 SERPL-SCNC: 25.1 MMOL/L (ref 22–29)
CREAT SERPL-MCNC: 0.73 MG/DL (ref 0.57–1)
DEPRECATED RDW RBC AUTO: 41.4 FL (ref 37–54)
EGFRCR SERPLBLD CKD-EPI 2021: 93.7 ML/MIN/1.73
EOSINOPHIL # BLD AUTO: 0.16 10*3/MM3 (ref 0–0.4)
EOSINOPHIL NFR BLD AUTO: 2.9 % (ref 0.3–6.2)
ERYTHROCYTE [DISTWIDTH] IN BLOOD BY AUTOMATED COUNT: 13 % (ref 12.3–15.4)
FOLATE SERPL-MCNC: 15.9 NG/ML (ref 4.78–24.2)
GLOBULIN UR ELPH-MCNC: 3 GM/DL
GLUCOSE SERPL-MCNC: 93 MG/DL (ref 65–99)
HCT VFR BLD AUTO: 39.6 % (ref 34–46.6)
HDLC SERPL-MCNC: 38 MG/DL (ref 40–60)
HGB BLD-MCNC: 13.3 G/DL (ref 12–15.9)
IMM GRANULOCYTES # BLD AUTO: 0.01 10*3/MM3 (ref 0–0.05)
IMM GRANULOCYTES NFR BLD AUTO: 0.2 % (ref 0–0.5)
IRON 24H UR-MRATE: 126 MCG/DL (ref 37–145)
IRON SATN MFR SERPL: 34 % (ref 20–50)
LDLC SERPL CALC-MCNC: 91 MG/DL (ref 0–100)
LDLC/HDLC SERPL: 2.23 {RATIO}
LYMPHOCYTES # BLD AUTO: 2.02 10*3/MM3 (ref 0.7–3.1)
LYMPHOCYTES NFR BLD AUTO: 36.9 % (ref 19.6–45.3)
MCH RBC QN AUTO: 29.4 PG (ref 26.6–33)
MCHC RBC AUTO-ENTMCNC: 33.6 G/DL (ref 31.5–35.7)
MCV RBC AUTO: 87.6 FL (ref 79–97)
MONOCYTES # BLD AUTO: 0.49 10*3/MM3 (ref 0.1–0.9)
MONOCYTES NFR BLD AUTO: 8.9 % (ref 5–12)
NEUTROPHILS NFR BLD AUTO: 2.76 10*3/MM3 (ref 1.7–7)
NEUTROPHILS NFR BLD AUTO: 50.4 % (ref 42.7–76)
NRBC BLD AUTO-RTO: 0 /100 WBC (ref 0–0.2)
PLATELET # BLD AUTO: 207 10*3/MM3 (ref 140–450)
PMV BLD AUTO: 12.3 FL (ref 6–12)
POTASSIUM SERPL-SCNC: 4.3 MMOL/L (ref 3.5–5.2)
PROT SERPL-MCNC: 7.4 G/DL (ref 6–8.5)
RBC # BLD AUTO: 4.52 10*6/MM3 (ref 3.77–5.28)
SODIUM SERPL-SCNC: 139 MMOL/L (ref 136–145)
T4 FREE SERPL-MCNC: 1.14 NG/DL (ref 0.93–1.7)
TIBC SERPL-MCNC: 373 MCG/DL (ref 298–536)
TRANSFERRIN SERPL-MCNC: 250 MG/DL (ref 200–360)
TRIGL SERPL-MCNC: 221 MG/DL (ref 0–150)
TSH SERPL DL<=0.05 MIU/L-ACNC: 1.43 UIU/ML (ref 0.27–4.2)
VIT B12 BLD-MCNC: 707 PG/ML (ref 211–946)
VLDLC SERPL-MCNC: 38 MG/DL (ref 5–40)
WBC NRBC COR # BLD: 5.48 10*3/MM3 (ref 3.4–10.8)

## 2022-04-27 PROCEDURE — 84439 ASSAY OF FREE THYROXINE: CPT

## 2022-04-27 PROCEDURE — 36415 COLL VENOUS BLD VENIPUNCTURE: CPT

## 2022-04-27 PROCEDURE — 80061 LIPID PANEL: CPT

## 2022-04-27 PROCEDURE — 82306 VITAMIN D 25 HYDROXY: CPT

## 2022-04-27 PROCEDURE — 82746 ASSAY OF FOLIC ACID SERUM: CPT

## 2022-04-27 PROCEDURE — 84443 ASSAY THYROID STIM HORMONE: CPT

## 2022-04-27 PROCEDURE — 73130 X-RAY EXAM OF HAND: CPT

## 2022-04-27 PROCEDURE — 82607 VITAMIN B-12: CPT

## 2022-04-27 PROCEDURE — 84466 ASSAY OF TRANSFERRIN: CPT

## 2022-04-27 PROCEDURE — 85025 COMPLETE CBC W/AUTO DIFF WBC: CPT

## 2022-04-27 PROCEDURE — 80053 COMPREHEN METABOLIC PANEL: CPT

## 2022-04-27 PROCEDURE — 99214 OFFICE O/P EST MOD 30 MIN: CPT | Performed by: NURSE PRACTITIONER

## 2022-04-27 PROCEDURE — 83540 ASSAY OF IRON: CPT

## 2022-04-27 NOTE — PROGRESS NOTES
Chief Complaint  Hyperlipidemia, Hypertension, Sore Throat, Nausea, Hot Flashes, Fatigue, and Hand Pain    Subjective            Tavictor manuel N Gentry presents to [facility]  Pt is here for a 6 mo f/u for HTN and HLD.   PT has c/o  fatigue. Pt would like to get a fatigue panel done.     Pt has c/o (L) hand pain/burning sensations. Pt describes this as someone cutting her hand with a knife. She feels like she has arthritis in her left hand and would like to have an xray.    Pt is due labs.         Past Medical History:   Diagnosis Date   • Abnormal bone density screening 10/15/2014    osteoporosis- tried fosamax in the past   • Acid reflux    • Arthritis 07/15/2021    left hip pain. risk for falls.    • Heart disease    • Hyperlipemia    • Osteoporosis 10/15/2014   • Palpitations    • Seasonal allergies    • Shoulder pain        Allergies   Allergen Reactions   • Hydrocodone-Acetaminophen Shortness Of Breath   • Tramadol Shortness Of Breath   • Azithromycin Unknown - Low Severity   • Latex Unknown - Low Severity        Past Surgical History:   Procedure Laterality Date   • COLONOSCOPY  10/2015    ameena sales    • COLONOSCOPY N/A 7/15/2021    Procedure: COLONOSCOPY with polypectomy/snare;  Surgeon: Adelfo Story MD;  Location: [facility] ENDOSCOPY;  Service: General;  Laterality: N/A;  colon polyps   • ENDOSCOPY N/A 7/15/2021    Procedure: ESOPHAGOGASTRODUODENOSCOPY with biopsies;  Surgeon: Adelfo Story MD;  Location: [facility] ENDOSCOPY;  Service: General;  Laterality: N/A;  gastric nodule   • SHOULDER SURGERY Right 2014    [facility]         Social History     Tobacco Use   • Smoking status: Never Smoker   • Smokeless tobacco: Never Used   Substance Use Topics   • Alcohol use: Never       Family History   Problem Relation Age of Onset   • Heart attack Mother    • Colon cancer Mother    • Heart disease Father    • Breast cancer Sister    • Stroke Other    • Breast cancer Other    • Malig  "Hyperthermia Neg Hx         Current Outpatient Medications on File Prior to Visit   Medication Sig   • Acetaminophen 325 MG capsule acetaminophen 325 mg oral capsule take 2 capsules by oral route daily   Active   • atorvastatin (LIPITOR) 20 MG tablet Take 1 tablet by mouth Every Night.   • cetirizine (zyrTEC) 10 MG tablet Take 1 tablet by mouth Daily.   • fluticasone (FLONASE) 50 MCG/ACT nasal spray 2 sprays into the nostril(s) as directed by provider Daily.   • ibuprofen (ADVIL,MOTRIN) 800 MG tablet Take 1 tablet by mouth Every 6 (Six) Hours As Needed for Mild Pain .   • metoprolol succinate XL (TOPROL-XL) 25 MG 24 hr tablet Take 1 tablet by mouth Daily.   • PreviDent 5000 Plus 1.1 % cream      No current facility-administered medications on file prior to visit.       Health Maintenance Due   Topic Date Due   • TDAP/TD VACCINES (1 - Tdap) Never done   • PAP SMEAR  04/25/2022       Objective     /69   Pulse 60   Ht 162.6 cm (64\")   Wt 62.6 kg (138 lb)   SpO2 97%   BMI 23.69 kg/m²       Physical Exam  Constitutional:       General: She is not in acute distress.     Appearance: Normal appearance. She is not ill-appearing.   HENT:      Head: Normocephalic and atraumatic.      Right Ear: Tympanic membrane, ear canal and external ear normal.      Left Ear: Tympanic membrane, ear canal and external ear normal.   Cardiovascular:      Rate and Rhythm: Normal rate and regular rhythm.      Heart sounds: Normal heart sounds. No murmur heard.  Pulmonary:      Effort: Pulmonary effort is normal. No respiratory distress.      Breath sounds: Normal breath sounds.   Chest:      Chest wall: No tenderness.   Abdominal:      General: There is no distension.      Palpations: There is no mass.      Tenderness: There is no abdominal tenderness. There is no guarding.   Musculoskeletal:         General: No swelling. Normal range of motion.      Left hand: Tenderness present.      Cervical back: Normal range of motion and neck " supple.   Skin:     General: Skin is warm and dry.      Findings: No rash.   Neurological:      General: No focal deficit present.      Mental Status: She is alert and oriented to person, place, and time. Mental status is at baseline.      Gait: Gait normal.   Psychiatric:         Mood and Affect: Mood normal.         Behavior: Behavior normal.         Thought Content: Thought content normal.         Judgment: Judgment normal.           Result Review :                           Assessment and Plan        Diagnoses and all orders for this visit:    1. Mixed hyperlipidemia (Primary)  Comments:  stable on lipitor 20mg, continue  Orders:  -     Comprehensive metabolic panel; Future  -     Lipid panel; Future    2. Fatigue, unspecified type  -     CBC w AUTO Differential; Future  -     Comprehensive metabolic panel; Future  -     Lipid panel; Future  -     Iron Profile; Future  -     Vitamin B12; Future  -     Folate; Future  -     Vitamin D 25 hydroxy; Future  -     TSH; Future  -     T4, free; Future    3. Palpitations  Comments:  stable on metoprolol 25mg, continue  Orders:  -     CBC w AUTO Differential; Future  -     Comprehensive metabolic panel; Future    4. Left hand pain  -     XR Hand 3+ View Left; Future    5. Anemia, unspecified type  -     Iron Profile; Future    6. Vitamin D deficiency  -     Vitamin D 25 hydroxy; Future              Follow Up     No follow-ups on file.    Patient was given instructions and counseling regarding her condition or for health maintenance advice. Please see specific information pulled into the AVS if appropriate.     Panda Rinaldi  reports that she has never smoked. She has never used smokeless tobacco..\

## 2022-04-27 NOTE — TELEPHONE ENCOUNTER
----- Message from MADELYN Mendiola sent at 4/27/2022 11:48 AM EDT -----  Osteoarthritis noted in hand

## 2022-04-27 NOTE — TELEPHONE ENCOUNTER
Pt aware. Per jose juan Noel to send Voltaren gel if pt wishes.    Pt would like this sent to [facility].

## 2022-04-28 ENCOUNTER — TELEPHONE (OUTPATIENT)
Dept: FAMILY MEDICINE CLINIC | Facility: CLINIC | Age: 62
End: 2022-04-28

## 2022-04-28 DIAGNOSIS — R79.89 ELEVATED LFTS: Primary | ICD-10-CM

## 2022-04-28 NOTE — TELEPHONE ENCOUNTER
----- Message from MADELYN Mendiola sent at 4/28/2022  6:35 AM EDT -----  Vit D low start OTC vit d supplement 2000 IU q day, also lft are elevated would like to repeat in 1 month, trig are elevated encourage low trig diet and daily cardio

## 2022-06-17 ENCOUNTER — TELEPHONE (OUTPATIENT)
Dept: FAMILY MEDICINE CLINIC | Facility: CLINIC | Age: 62
End: 2022-06-17

## 2022-06-20 ENCOUNTER — LAB (OUTPATIENT)
Dept: LAB | Facility: HOSPITAL | Age: 62
End: 2022-06-20

## 2022-06-20 DIAGNOSIS — R79.89 ELEVATED LFTS: ICD-10-CM

## 2022-06-20 LAB
ALBUMIN SERPL-MCNC: 4.9 G/DL (ref 3.5–5.2)
ALBUMIN/GLOB SERPL: 2.3 G/DL
ALP SERPL-CCNC: 73 U/L (ref 39–117)
ALT SERPL W P-5'-P-CCNC: 71 U/L (ref 1–33)
ANION GAP SERPL CALCULATED.3IONS-SCNC: 12.3 MMOL/L (ref 5–15)
AST SERPL-CCNC: 42 U/L (ref 1–32)
BILIRUB SERPL-MCNC: 0.4 MG/DL (ref 0–1.2)
BUN SERPL-MCNC: 12 MG/DL (ref 8–23)
BUN/CREAT SERPL: 15.8 (ref 7–25)
CALCIUM SPEC-SCNC: 9.3 MG/DL (ref 8.6–10.5)
CHLORIDE SERPL-SCNC: 105 MMOL/L (ref 98–107)
CO2 SERPL-SCNC: 22.7 MMOL/L (ref 22–29)
CREAT SERPL-MCNC: 0.76 MG/DL (ref 0.57–1)
EGFRCR SERPLBLD CKD-EPI 2021: 89.3 ML/MIN/1.73
GLOBULIN UR ELPH-MCNC: 2.1 GM/DL
GLUCOSE SERPL-MCNC: 100 MG/DL (ref 65–99)
POTASSIUM SERPL-SCNC: 4.4 MMOL/L (ref 3.5–5.2)
PROT SERPL-MCNC: 7 G/DL (ref 6–8.5)
SODIUM SERPL-SCNC: 140 MMOL/L (ref 136–145)

## 2022-06-20 PROCEDURE — 36415 COLL VENOUS BLD VENIPUNCTURE: CPT

## 2022-06-20 PROCEDURE — 80053 COMPREHEN METABOLIC PANEL: CPT

## 2022-06-21 DIAGNOSIS — R79.89 ELEVATED LFTS: Primary | ICD-10-CM

## 2022-07-08 ENCOUNTER — HOSPITAL ENCOUNTER (OUTPATIENT)
Dept: ULTRASOUND IMAGING | Facility: HOSPITAL | Age: 62
Discharge: HOME | End: 2022-07-08
Admitting: NURSE PRACTITIONER

## 2022-07-08 DIAGNOSIS — R79.89 ELEVATED LFTS: ICD-10-CM

## 2022-07-08 PROCEDURE — 76705 ECHO EXAM OF ABDOMEN: CPT

## 2022-07-12 ENCOUNTER — TELEPHONE (OUTPATIENT)
Dept: FAMILY MEDICINE CLINIC | Facility: CLINIC | Age: 62
End: 2022-07-12

## 2022-07-12 DIAGNOSIS — R16.0 ENLARGED LIVER: Primary | ICD-10-CM

## 2022-07-12 NOTE — TELEPHONE ENCOUNTER
----- Message from MADELYN Mendiola sent at 7/10/2022  1:03 PM EDT -----  Enlarged liver, consult GI to further evaluate

## 2022-07-12 NOTE — TELEPHONE ENCOUNTER
Pt aware. Referral placed to Dr. Velazquez.    Pt has catie on 07/20 to discuss labs and other questions

## 2022-07-17 ENCOUNTER — APPOINTMENT (OUTPATIENT)
Dept: ULTRASOUND IMAGING | Facility: HOSPITAL | Age: 62
End: 2022-07-17

## 2022-07-20 ENCOUNTER — OFFICE VISIT (OUTPATIENT)
Dept: FAMILY MEDICINE CLINIC | Facility: CLINIC | Age: 62
End: 2022-07-20

## 2022-07-20 VITALS
HEART RATE: 70 BPM | WEIGHT: 138 LBS | DIASTOLIC BLOOD PRESSURE: 72 MMHG | HEIGHT: 64 IN | BODY MASS INDEX: 23.56 KG/M2 | SYSTOLIC BLOOD PRESSURE: 114 MMHG | OXYGEN SATURATION: 97 %

## 2022-07-20 DIAGNOSIS — M19.90 ARTHRITIS: Primary | ICD-10-CM

## 2022-07-20 DIAGNOSIS — R16.0 ENLARGED LIVER: ICD-10-CM

## 2022-07-20 DIAGNOSIS — R20.3 SENSITIVE SKIN: ICD-10-CM

## 2022-07-20 DIAGNOSIS — E78.5 HYPERLIPIDEMIA, UNSPECIFIED HYPERLIPIDEMIA TYPE: ICD-10-CM

## 2022-07-20 DIAGNOSIS — I10 PRIMARY HYPERTENSION: ICD-10-CM

## 2022-07-20 PROCEDURE — 99214 OFFICE O/P EST MOD 30 MIN: CPT | Performed by: NURSE PRACTITIONER

## 2022-07-20 NOTE — PROGRESS NOTES
"Chief Complaint  Skin sensitivity, HTN and hyperlipidemia f/u, arthritis    Subjective            Tae N Range presents to [facility]  Pt has not been scheduled with Dr. Velazquez yet to discuss enlarged liver. Pt's referral is under review. PT given phone number to call to get scheduled.    Pt would like to discuss arthritis. Pt has been using diclofenac gel with no relief. She has tried and failed Mobic, she requests referral to rheumatology.    Pt has c/o sensitive skin. Pt has noticed when she has sun exposure she will \"goosebumps\" and redness. Pt states it is sensitive to touch and will become itchy. She requests a referral to Dermatology.  She does not currently have a rash.  She reports she wears long sleeves a lot to help prevent it.        Past Medical History:   Diagnosis Date   • Abnormal bone density screening 10/15/2014    osteoporosis- tried fosamax in the past   • Acid reflux    • Arthritis 07/15/2021    left hip pain. risk for falls.    • Heart disease    • Hyperlipemia    • Osteoporosis 10/15/2014   • Palpitations    • Seasonal allergies    • Shoulder pain        Allergies   Allergen Reactions   • Hydrocodone-Acetaminophen Shortness Of Breath   • Tramadol Shortness Of Breath   • Azithromycin Unknown - Low Severity   • Latex Unknown - Low Severity        Past Surgical History:   Procedure Laterality Date   • COLONOSCOPY  10/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    • COLONOSCOPY N/A 7/15/2021    Procedure: COLONOSCOPY with polypectomy/snare;  Surgeon: Adelfo Story MD;  Location: [facility] ENDOSCOPY;  Service: General;  Laterality: N/A;  colon polyps   • ENDOSCOPY N/A 7/15/2021    Procedure: ESOPHAGOGASTRODUODENOSCOPY with biopsies;  Surgeon: Adlefo Story MD;  Location: [facility] ENDOSCOPY;  Service: General;  Laterality: N/A;  gastric nodule   • SHOULDER SURGERY Right 2014    [facility]         Social History     Tobacco Use   • Smoking status: Never Smoker   • Smokeless tobacco: Never Used " "  Substance Use Topics   • Alcohol use: Never       Family History   Problem Relation Age of Onset   • Heart attack Mother    • Colon cancer Mother    • Heart disease Father    • Breast cancer Sister    • Stroke Other    • Breast cancer Other    • Malig Hyperthermia Neg Hx         Current Outpatient Medications on File Prior to Visit   Medication Sig   • Acetaminophen 325 MG capsule acetaminophen 325 mg oral capsule take 2 capsules by oral route daily   Active   • atorvastatin (LIPITOR) 20 MG tablet Take 1 tablet by mouth Every Night.   • cetirizine (zyrTEC) 10 MG tablet Take 1 tablet by mouth Daily.   • Diclofenac Sodium (VOLTAREN) 1 % gel gel Apply 4 g topically to the appropriate area as directed 2 (Two) Times a Day.   • fluticasone (FLONASE) 50 MCG/ACT nasal spray 2 sprays into the nostril(s) as directed by provider Daily.   • ibuprofen (ADVIL,MOTRIN) 800 MG tablet Take 1 tablet by mouth Every 6 (Six) Hours As Needed for Mild Pain .   • metoprolol succinate XL (TOPROL-XL) 25 MG 24 hr tablet Take 1 tablet by mouth Daily.   • [DISCONTINUED] brompheniramine-pseudoephedrine-DM 30-2-10 MG/5ML syrup Take 10 mL by mouth 4 (Four) Times a Day As Needed for Allergies.   • [DISCONTINUED] PreviDent 5000 Plus 1.1 % cream      No current facility-administered medications on file prior to visit.       Health Maintenance Due   Topic Date Due   • TDAP/TD VACCINES (1 - Tdap) Never done   • COVID-19 Vaccine (4 - Booster for Pfizer series) 04/21/2022   • PAP SMEAR  04/25/2022       Objective     /72   Pulse 70   Ht 162.6 cm (64\")   Wt 62.6 kg (138 lb)   SpO2 97%   BMI 23.69 kg/m²       Physical Exam  Constitutional:       General: She is not in acute distress.     Appearance: Normal appearance. She is not ill-appearing.   HENT:      Head: Normocephalic and atraumatic.   Cardiovascular:      Rate and Rhythm: Normal rate and regular rhythm.      Heart sounds: Normal heart sounds. No murmur heard.  Pulmonary:      Effort: " Pulmonary effort is normal. No respiratory distress.      Breath sounds: Normal breath sounds.   Chest:      Chest wall: No tenderness.   Abdominal:      General: There is no distension.      Palpations: There is no mass.      Tenderness: There is no abdominal tenderness. There is no guarding.   Musculoskeletal:         General: No swelling or tenderness. Normal range of motion.      Cervical back: Normal range of motion and neck supple.   Skin:     General: Skin is warm and dry.      Findings: No rash.   Neurological:      General: No focal deficit present.      Mental Status: She is alert and oriented to person, place, and time. Mental status is at baseline.      Gait: Gait normal.   Psychiatric:         Mood and Affect: Mood normal.         Behavior: Behavior normal.         Thought Content: Thought content normal.         Judgment: Judgment normal.           Result Review :                           Assessment and Plan        Diagnoses and all orders for this visit:    1. Arthritis (Primary)  Comments:  will refer to rheumatology per pt request  Orders:  -     Ambulatory Referral to Rheumatology    2. Enlarged liver  Comments:  GI referral has been placed, pt to keep appt    3. Sensitive skin  Comments:  Dermatology referral placed per pt request  Orders:  -     Ambulatory Referral to Dermatology    4. Hyperlipidemia, unspecified hyperlipidemia type  Comments:  stable on lipitor 20mg, continue    5. Primary hypertension  Comments:  stable on metoprolol 25mg, continue              Follow Up     Return in about 6 months (around 1/20/2023).    Patient was given instructions and counseling regarding her condition or for health maintenance advice. Please see specific information pulled into the AVS if appropriate.            MADELYN Mendiola

## 2022-08-08 ENCOUNTER — TRANSCRIBE ORDERS (OUTPATIENT)
Dept: LAB | Facility: HOSPITAL | Age: 62
End: 2022-08-08

## 2022-08-08 ENCOUNTER — LAB (OUTPATIENT)
Dept: LAB | Facility: HOSPITAL | Age: 62
End: 2022-08-08

## 2022-08-08 DIAGNOSIS — R07.9 CHEST PAIN, UNSPECIFIED TYPE: Primary | ICD-10-CM

## 2022-08-08 DIAGNOSIS — R07.9 CHEST PAIN, UNSPECIFIED TYPE: ICD-10-CM

## 2022-08-08 LAB
ALBUMIN SERPL-MCNC: 4.9 G/DL (ref 3.5–5.2)
ALBUMIN/GLOB SERPL: 1.8 G/DL
ALP SERPL-CCNC: 74 U/L (ref 39–117)
ALT SERPL W P-5'-P-CCNC: 78 U/L (ref 1–33)
ANION GAP SERPL CALCULATED.3IONS-SCNC: 12 MMOL/L (ref 5–15)
AST SERPL-CCNC: 54 U/L (ref 1–32)
BILIRUB SERPL-MCNC: 0.5 MG/DL (ref 0–1.2)
BUN SERPL-MCNC: 11 MG/DL (ref 8–23)
BUN/CREAT SERPL: 14.9 (ref 7–25)
CALCIUM SPEC-SCNC: 10.1 MG/DL (ref 8.6–10.5)
CHLORIDE SERPL-SCNC: 103 MMOL/L (ref 98–107)
CHOLEST SERPL-MCNC: 184 MG/DL (ref 0–200)
CO2 SERPL-SCNC: 25 MMOL/L (ref 22–29)
CREAT SERPL-MCNC: 0.74 MG/DL (ref 0.57–1)
EGFRCR SERPLBLD CKD-EPI 2021: 92.2 ML/MIN/1.73
GLOBULIN UR ELPH-MCNC: 2.8 GM/DL
GLUCOSE SERPL-MCNC: 106 MG/DL (ref 65–99)
HDLC SERPL-MCNC: 39 MG/DL (ref 40–60)
LDLC SERPL CALC-MCNC: 111 MG/DL (ref 0–100)
LDLC/HDLC SERPL: 2.73 {RATIO}
POTASSIUM SERPL-SCNC: 4.7 MMOL/L (ref 3.5–5.2)
PROT SERPL-MCNC: 7.7 G/DL (ref 6–8.5)
SODIUM SERPL-SCNC: 140 MMOL/L (ref 136–145)
TRIGL SERPL-MCNC: 192 MG/DL (ref 0–150)
VLDLC SERPL-MCNC: 34 MG/DL (ref 5–40)

## 2022-08-08 PROCEDURE — 80061 LIPID PANEL: CPT

## 2022-08-08 PROCEDURE — 80053 COMPREHEN METABOLIC PANEL: CPT

## 2022-08-08 PROCEDURE — 36415 COLL VENOUS BLD VENIPUNCTURE: CPT

## 2022-10-04 ENCOUNTER — LAB (OUTPATIENT)
Dept: LAB | Facility: HOSPITAL | Age: 62
End: 2022-10-04

## 2022-10-04 ENCOUNTER — OFFICE VISIT (OUTPATIENT)
Dept: GASTROENTEROLOGY | Facility: CLINIC | Age: 62
End: 2022-10-04

## 2022-10-04 VITALS
SYSTOLIC BLOOD PRESSURE: 111 MMHG | BODY MASS INDEX: 22.77 KG/M2 | HEIGHT: 64 IN | OXYGEN SATURATION: 100 % | HEART RATE: 69 BPM | WEIGHT: 133.4 LBS | DIASTOLIC BLOOD PRESSURE: 68 MMHG

## 2022-10-04 DIAGNOSIS — K31.A0 INTESTINAL METAPLASIA OF STOMACH: ICD-10-CM

## 2022-10-04 DIAGNOSIS — R16.0 HEPATOMEGALY: Primary | ICD-10-CM

## 2022-10-04 DIAGNOSIS — R74.8 ELEVATED LIVER ENZYMES: ICD-10-CM

## 2022-10-04 LAB
ALBUMIN SERPL-MCNC: 4.8 G/DL (ref 3.5–5.2)
ALP SERPL-CCNC: 75 U/L (ref 39–117)
ALPHA-FETOPROTEIN: 2.03 NG/ML (ref 0–8.3)
ALPHA1 GLOB MFR UR ELPH: 124 MG/DL (ref 90–200)
ALT SERPL W P-5'-P-CCNC: 46 U/L (ref 1–33)
AST SERPL-CCNC: 29 U/L (ref 1–32)
BILIRUB CONJ SERPL-MCNC: <0.2 MG/DL (ref 0–0.3)
BILIRUB INDIRECT SERPL-MCNC: ABNORMAL MG/DL
BILIRUB SERPL-MCNC: 0.5 MG/DL (ref 0–1.2)
CERULOPLASMIN SERPL-MCNC: 25 MG/DL (ref 19–39)
DSDNA IGG SERPL IA-ACNC: NEGATIVE [IU]/ML
FERRITIN SERPL-MCNC: 143 NG/ML (ref 13–150)
HAV IGM SERPL QL IA: NORMAL
HBV CORE IGM SERPL QL IA: NORMAL
HBV SURFACE AG SERPL QL IA: NORMAL
HCV AB SER DONR QL: NORMAL
INR PPP: 0.93 (ref 0.86–1.15)
IRON 24H UR-MRATE: 118 MCG/DL (ref 37–145)
IRON SATN MFR SERPL: 33 % (ref 20–50)
NUCLEAR IGG SER IA-RTO: NEGATIVE
PROT SERPL-MCNC: 7.8 G/DL (ref 6–8.5)
PROTHROMBIN TIME: 12.6 SECONDS (ref 11.8–14.9)
TIBC SERPL-MCNC: 358 MCG/DL (ref 298–536)
TRANSFERRIN SERPL-MCNC: 240 MG/DL (ref 200–360)

## 2022-10-04 PROCEDURE — 82728 ASSAY OF FERRITIN: CPT

## 2022-10-04 PROCEDURE — 82390 ASSAY OF CERULOPLASMIN: CPT

## 2022-10-04 PROCEDURE — 86334 IMMUNOFIX E-PHORESIS SERUM: CPT

## 2022-10-04 PROCEDURE — 80076 HEPATIC FUNCTION PANEL: CPT

## 2022-10-04 PROCEDURE — 82105 ALPHA-FETOPROTEIN SERUM: CPT

## 2022-10-04 PROCEDURE — 86225 DNA ANTIBODY NATIVE: CPT

## 2022-10-04 PROCEDURE — 82525 ASSAY OF COPPER: CPT

## 2022-10-04 PROCEDURE — 83540 ASSAY OF IRON: CPT

## 2022-10-04 PROCEDURE — 82784 ASSAY IGA/IGD/IGG/IGM EACH: CPT

## 2022-10-04 PROCEDURE — 82103 ALPHA-1-ANTITRYPSIN TOTAL: CPT

## 2022-10-04 PROCEDURE — 36415 COLL VENOUS BLD VENIPUNCTURE: CPT

## 2022-10-04 PROCEDURE — 86015 ACTIN ANTIBODY EACH: CPT

## 2022-10-04 PROCEDURE — 86381 MITOCHONDRIAL ANTIBODY EACH: CPT

## 2022-10-04 PROCEDURE — 80074 ACUTE HEPATITIS PANEL: CPT

## 2022-10-04 PROCEDURE — 86038 ANTINUCLEAR ANTIBODIES: CPT

## 2022-10-04 PROCEDURE — 99204 OFFICE O/P NEW MOD 45 MIN: CPT

## 2022-10-04 PROCEDURE — 85610 PROTHROMBIN TIME: CPT

## 2022-10-04 PROCEDURE — 84466 ASSAY OF TRANSFERRIN: CPT

## 2022-10-04 RX ORDER — PANTOPRAZOLE SODIUM 40 MG/1
TABLET, DELAYED RELEASE ORAL
COMMUNITY
Start: 2022-08-16 | End: 2022-10-13

## 2022-10-04 RX ORDER — TRIAMCINOLONE ACETONIDE 1 MG/G
CREAM TOPICAL
COMMUNITY
Start: 2022-07-27 | End: 2022-10-13

## 2022-10-05 LAB
IGA SERPL-MCNC: 239 MG/DL (ref 87–352)
IGG SERPL-MCNC: 1351 MG/DL (ref 586–1602)
IGM SERPL-MCNC: 173 MG/DL (ref 26–217)
MITOCHONDRIA M2 IGG SER-ACNC: <20 UNITS (ref 0–20)
PROT PATTERN SERPL IFE-IMP: NORMAL
SMA IGG SER-ACNC: 9 UNITS (ref 0–19)

## 2022-10-06 ENCOUNTER — TELEPHONE (OUTPATIENT)
Dept: GASTROENTEROLOGY | Facility: CLINIC | Age: 62
End: 2022-10-06

## 2022-10-06 LAB — COPPER SERPL-MCNC: 105 UG/DL (ref 80–158)

## 2022-10-06 NOTE — TELEPHONE ENCOUNTER
----- Message from MADELYN Hodges sent at 10/6/2022  7:43 AM EDT -----  I have reviewed the patient's most recent liver work-up which is normal ruling out autoimmune or underlying hepatitis as a cause of elevated liver enzymes.  Repeat enzymes have improved, ALT still remains slightly elevated. Advise patient to continue to maintain a healthy lifestyle: cutting back on carbs, sugars, and fried fatty foods, limiting intake of soda and sugary drinks, and abstain from alcohol.

## 2022-10-07 ENCOUNTER — TELEPHONE (OUTPATIENT)
Dept: GASTROENTEROLOGY | Facility: CLINIC | Age: 62
End: 2022-10-07

## 2022-10-13 ENCOUNTER — OFFICE VISIT (OUTPATIENT)
Dept: FAMILY MEDICINE CLINIC | Facility: CLINIC | Age: 62
End: 2022-10-13

## 2022-10-13 VITALS
SYSTOLIC BLOOD PRESSURE: 113 MMHG | WEIGHT: 132 LBS | BODY MASS INDEX: 22.53 KG/M2 | OXYGEN SATURATION: 99 % | HEART RATE: 71 BPM | DIASTOLIC BLOOD PRESSURE: 75 MMHG | HEIGHT: 64 IN

## 2022-10-13 DIAGNOSIS — Z23 NEED FOR INFLUENZA VACCINATION: ICD-10-CM

## 2022-10-13 DIAGNOSIS — J30.2 SEASONAL ALLERGIC RHINITIS, UNSPECIFIED TRIGGER: ICD-10-CM

## 2022-10-13 DIAGNOSIS — Z00.00 MEDICARE ANNUAL WELLNESS VISIT, SUBSEQUENT: Primary | ICD-10-CM

## 2022-10-13 PROCEDURE — 1160F RVW MEDS BY RX/DR IN RCRD: CPT | Performed by: NURSE PRACTITIONER

## 2022-10-13 PROCEDURE — 1125F AMNT PAIN NOTED PAIN PRSNT: CPT | Performed by: NURSE PRACTITIONER

## 2022-10-13 PROCEDURE — G0439 PPPS, SUBSEQ VISIT: HCPCS | Performed by: NURSE PRACTITIONER

## 2022-10-13 PROCEDURE — 90686 IIV4 VACC NO PRSV 0.5 ML IM: CPT | Performed by: NURSE PRACTITIONER

## 2022-10-13 PROCEDURE — G0008 ADMIN INFLUENZA VIRUS VAC: HCPCS | Performed by: NURSE PRACTITIONER

## 2022-10-13 PROCEDURE — 1170F FXNL STATUS ASSESSED: CPT | Performed by: NURSE PRACTITIONER

## 2022-10-13 RX ORDER — CETIRIZINE HYDROCHLORIDE 10 MG/1
10 TABLET ORAL DAILY
Qty: 90 TABLET | Refills: 0 | Status: SHIPPED | OUTPATIENT
Start: 2022-10-13 | End: 2023-01-19 | Stop reason: SDUPTHER

## 2022-10-13 NOTE — PROGRESS NOTES
The ABCs of the Annual Wellness Visit  Subsequent Medicare Wellness Visit    Chief Complaint   Patient presents with   • Medicare Wellness-subsequent      Subjective    History of Present Illness:  Panda Rinaldi is a 62 y.o. female who presents for a Subsequent Medicare Wellness Visit.    The following portions of the patient's history were reviewed and   updated as appropriate: allergies, current medications, past family history, past medical history, past social history, past surgical history and problem list.    Compared to one year ago, the patient feels her physical   health is the same.    Compared to one year ago, the patient feels her mental   health is the same.    Recent Hospitalizations:  She was not admitted to the hospital during the last year.       Current Medical Providers:  Patient Care Team:  Sadie Fragoso APRN as PCP - General (Nurse Practitioner)    Outpatient Medications Prior to Visit   Medication Sig Dispense Refill   • Acetaminophen 325 MG capsule acetaminophen 325 mg oral capsule take 2 capsules by oral route daily   Active     • cetirizine (zyrTEC) 10 MG tablet Take 1 tablet by mouth Daily. 90 tablet 0   • Diclofenac Sodium (VOLTAREN) 1 % gel gel Apply 4 g topically to the appropriate area as directed 2 (Two) Times a Day. 2 g 2   • fluticasone (FLONASE) 50 MCG/ACT nasal spray 2 sprays into the nostril(s) as directed by provider Daily. 16 g 2   • ibuprofen (ADVIL,MOTRIN) 800 MG tablet Take 1 tablet by mouth Every 6 (Six) Hours As Needed for Mild Pain . 90 tablet 0   • metoprolol succinate XL (TOPROL-XL) 25 MG 24 hr tablet Take 1 tablet by mouth Daily. 90 tablet 0   • tretinoin (RETIN-A) 0.025 % cream      • atorvastatin (LIPITOR) 20 MG tablet Take 1 tablet by mouth Every Night. 90 tablet 0   • pantoprazole (PROTONIX) 40 MG EC tablet      • triamcinolone (KENALOG) 0.1 % cream        No facility-administered medications prior to visit.       No opioid medication identified on active medication  "list. I have reviewed chart for other potential  high risk medication/s and harmful drug interactions in the elderly.          Aspirin is not on active medication list.  Aspirin use is not indicated based on review of current medical condition/s. Risk of harm outweighs potential benefits.  .    Patient Active Problem List   Diagnosis   • Osteoarthritis   • Heart disease   • Hyperlipemia   • Radicular pain   • Neck pain   • Palpitations   • Prolapsed cervical intervertebral disc   • Seasonal allergic rhinitis   • Shoulder pain   • Acid reflux   • Colon polyps     Advance Care Planning  Advance Directive is not on file.  ACP discussion was declined by the patient. Patient does not have an advance directive, declines further assistance.          Objective    Vitals:    10/13/22 0935   BP: 113/75   Pulse: 71   SpO2: 99%   Weight: 59.9 kg (132 lb)   Height: 162.6 cm (64\")   PainSc:   6     Estimated body mass index is 22.66 kg/m² as calculated from the following:    Height as of this encounter: 162.6 cm (64\").    Weight as of this encounter: 59.9 kg (132 lb).    BMI is within normal parameters. No other follow-up for BMI required.      Does the patient have evidence of cognitive impairment? No      Lab Results   Component Value Date    TRIG 192 (H) 08/08/2022    HDL 39 (L) 08/08/2022     (H) 08/08/2022    VLDL 34 08/08/2022            HEALTH RISK ASSESSMENT    Smoking Status:  Social History     Tobacco Use   Smoking Status Never   Smokeless Tobacco Never     Alcohol Consumption:  Social History     Substance and Sexual Activity   Alcohol Use Never     Fall Risk Screen:    STEADI Fall Risk Assessment was completed, and patient is at LOW risk for falls.Assessment completed on:10/13/2022    Depression Screening:  PHQ-2/PHQ-9 Depression Screening 10/13/2022   Retired PHQ-9 Total Score -   Retired Total Score -   Little Interest or Pleasure in Doing Things 0-->not at all   Feeling Down, Depressed or Hopeless 0-->not " at all   PHQ-9: Brief Depression Severity Measure Score 0       Health Habits and Functional and Cognitive Screening:  Functional & Cognitive Status 10/13/2022   Do you have difficulty preparing food and eating? No   Do you have difficulty bathing yourself, getting dressed or grooming yourself? No   Do you have difficulty using the toilet? No   Do you have difficulty moving around from place to place? No   Do you have trouble with steps or getting out of a bed or a chair? No   Current Diet Well Balanced Diet   Dental Exam Up to date   Eye Exam Up to date   Exercise (times per week) 0 times per week   Current Exercises Include No Regular Exercise   Do you need help using the phone?  No   Are you deaf or do you have serious difficulty hearing?  No   Do you need help with transportation? No   Do you need help shopping? No   Do you need help preparing meals?  No   Do you need help with housework?  No   Do you need help with laundry? No   Do you need help taking your medications? No   Do you need help managing money? No   Do you ever drive or ride in a car without wearing a seat belt? No   Have you felt unusual stress, anger or loneliness in the last month? No   Who do you live with? Spouse   If you need help, do you have trouble finding someone available to you? No   Have you been bothered in the last four weeks by sexual problems? No   Do you have difficulty concentrating, remembering or making decisions? No       Age-appropriate Screening Schedule:  Refer to the list below for future screening recommendations based on patient's age, sex and/or medical conditions. Orders for these recommended tests are listed in the plan section. The patient has been provided with a written plan.    Health Maintenance   Topic Date Due   • TDAP/TD VACCINES (1 - Tdap) Never done   • INFLUENZA VACCINE  08/01/2022   • MAMMOGRAM  01/25/2023   • DXA SCAN  07/19/2023   • LIPID PANEL  08/08/2023   • PAP SMEAR  05/04/2024   • ZOSTER VACCINE   Completed              Assessment & Plan   CMS Preventative Services Quick Reference  Risk Factors Identified During Encounter  Chronic Pain   The above risks/problems have been discussed with the patient.  Follow up actions/plans if indicated are seen below in the Assessment/Plan Section.  Pertinent information has been shared with the patient in the After Visit Summary.    Diagnoses and all orders for this visit:    1. Medicare annual wellness visit, subsequent (Primary)    2. Need for influenza vaccination  -     FluLaval/Fluzone >6 mos ([id number])    3. Osteoarthritis, unspecified osteoarthritis type, unspecified site  Comments:  stable on voltaren gel, continue    4. Other chronic pain        Follow Up:   Return in about 1 year (around 10/13/2023) for Medicare Wellness.     An After Visit Summary and PPPS were made available to the patient.

## 2022-10-17 ENCOUNTER — OFFICE VISIT (OUTPATIENT)
Dept: FAMILY MEDICINE CLINIC | Facility: CLINIC | Age: 62
End: 2022-10-17

## 2022-10-17 VITALS
HEIGHT: 64 IN | HEART RATE: 70 BPM | BODY MASS INDEX: 22.36 KG/M2 | WEIGHT: 131 LBS | DIASTOLIC BLOOD PRESSURE: 69 MMHG | OXYGEN SATURATION: 100 % | SYSTOLIC BLOOD PRESSURE: 116 MMHG

## 2022-10-17 DIAGNOSIS — M54.41 ACUTE RIGHT-SIDED LOW BACK PAIN WITH RIGHT-SIDED SCIATICA: Primary | ICD-10-CM

## 2022-10-17 DIAGNOSIS — S39.012A STRAIN OF LUMBAR REGION, INITIAL ENCOUNTER: ICD-10-CM

## 2022-10-17 PROCEDURE — 96372 THER/PROPH/DIAG INJ SC/IM: CPT | Performed by: NURSE PRACTITIONER

## 2022-10-17 PROCEDURE — 99213 OFFICE O/P EST LOW 20 MIN: CPT | Performed by: NURSE PRACTITIONER

## 2022-10-17 RX ORDER — CYCLOBENZAPRINE HCL 10 MG
10 TABLET ORAL 3 TIMES DAILY PRN
Qty: 21 TABLET | Refills: 0 | Status: SHIPPED | OUTPATIENT
Start: 2022-10-17 | End: 2022-10-24

## 2022-10-17 RX ORDER — TRIAMCINOLONE ACETONIDE 40 MG/ML
60 INJECTION, SUSPENSION INTRA-ARTICULAR; INTRAMUSCULAR ONCE
Status: COMPLETED | OUTPATIENT
Start: 2022-10-17 | End: 2022-10-17

## 2022-10-17 RX ADMIN — TRIAMCINOLONE ACETONIDE 60 MG: 40 INJECTION, SUSPENSION INTRA-ARTICULAR; INTRAMUSCULAR at 14:16

## 2022-10-17 NOTE — PROGRESS NOTES
Chief Complaint  Back Pain    Subjective            Tae N Range presents to [facility]  History of Present Illness  Pt has c/o low back pain with sciatica. Pt states she moved wrong 5 days ago and has had pain since. Pt has older RX for flexeril and states this has been helping some. Pt would like a refill of this sent to [facility]. Pt has also been using heating pad on her back.         Past Medical History:   Diagnosis Date   • Abnormal bone density screening 10/15/2014    osteoporosis- tried fosamax in the past   • Acid reflux    • Arthritis 07/15/2021    left hip pain. risk for falls.    • Colon polyp 2021   • Coronary artery disease 2018   • Fatty liver 2022   • Heart disease    • Hyperlipemia    • Osteoporosis 10/15/2014   • Palpitations    • Seasonal allergies    • Shoulder pain        Allergies   Allergen Reactions   • Hydrocodone-Acetaminophen Shortness Of Breath   • Tramadol Shortness Of Breath   • Azithromycin Unknown - Low Severity   • Latex Unknown - Low Severity        Past Surgical History:   Procedure Laterality Date   [facility]    • COLONOSCOPY N/A 07/15/2021    Procedure: COLONOSCOPY with polypectomy/snare;  Surgeon: Adelfo Story MD;  Location: [facility] END[facility]COPY;  Service: General;  Laterality: N/A;  colon polyps   • ENDOSCOPY N/A 07/15/2021    Procedure: ESOPHAGOGASTRODUODENOSCOPY with biopsies;  Surgeon: Adelfo Story MD;  Location: [facility] ENDOSCOPY;  Service: General;  Laterality: N/A;  gastric nodule   • SHOULDER SURGERY Right 2014    [facility]    • UPPER GASTROINTESTINAL ENDOSCOPY          Social History     Tobacco Use   • Smoking status: Never   • Smokeless tobacco: Never   Substance Use Topics   • Alcohol use: Never       Family History   Problem Relation Age of Onset   • Heart attack Mother    • Colon cancer Mother 65   • Heart disease Father    • Breast cancer Sister    • Stroke Other    • Breast cancer Other    • Malig  "Hyperthermia Neg Hx         Current Outpatient Medications on File Prior to Visit   Medication Sig   • Acetaminophen 325 MG capsule acetaminophen 325 mg oral capsule take 2 capsules by oral route daily   Active   • atorvastatin (LIPITOR) 20 MG tablet Take 1 tablet by mouth Every Night.   • cetirizine (zyrTEC) 10 MG tablet Take 1 tablet by mouth Daily.   • Diclofenac Sodium (VOLTAREN) 1 % gel gel Apply 4 g topically to the appropriate area as directed 2 (Two) Times a Day.   • fluticasone (FLONASE) 50 MCG/ACT nasal spray 2 sprays into the nostril(s) as directed by provider Daily.   • ibuprofen (ADVIL,MOTRIN) 800 MG tablet Take 1 tablet by mouth Every 6 (Six) Hours As Needed for Mild Pain .   • metoprolol succinate XL (TOPROL-XL) 25 MG 24 hr tablet Take 1 tablet by mouth Daily.   • tretinoin (RETIN-A) 0.025 % cream      No current facility-administered medications on file prior to visit.       Health Maintenance Due   Topic Date Due   • TDAP/TD VACCINES (1 - Tdap) Never done   • COVID-19 Vaccine (4 - Booster for Pfizer series) 02/15/2022       Objective     /69   Pulse 70   Ht 162.6 cm (64\")   Wt 59.4 kg (131 lb)   SpO2 100%   BMI 22.49 kg/m²       Physical Exam  Constitutional:       General: She is not in acute distress.     Appearance: Normal appearance. She is not ill-appearing.   HENT:      Head: Normocephalic and atraumatic.   Cardiovascular:      Rate and Rhythm: Normal rate and regular rhythm.      Heart sounds: Normal heart sounds. No murmur heard.  Pulmonary:      Effort: Pulmonary effort is normal. No respiratory distress.      Breath sounds: Normal breath sounds.   Chest:      Chest wall: No tenderness.   Abdominal:      General: There is no distension.      Palpations: There is no mass.      Tenderness: There is no abdominal tenderness. There is no guarding.   Musculoskeletal:         General: No swelling. Normal range of motion.      Cervical back: Normal range of motion and neck supple.      " Lumbar back: Tenderness present.      Comments: Tenderness upon palpation across lumbar region   Skin:     General: Skin is warm and dry.      Findings: No rash.   Neurological:      General: No focal deficit present.      Mental Status: She is alert and oriented to person, place, and time. Mental status is at baseline.      Gait: Gait normal.   Psychiatric:         Mood and Affect: Mood normal.         Behavior: Behavior normal.         Thought Content: Thought content normal.         Judgment: Judgment normal.           Result Review :                           Assessment and Plan        Diagnoses and all orders for this visit:    1. Acute right-sided low back pain with right-sided sciatica (Primary)  Comments:  encouraged moist heat, deep masssage and avoid heavy lifting  Orders:  -     triamcinolone acetonide (KENALOG-40) injection 60 mg  -     cyclobenzaprine (FLEXERIL) 10 MG tablet; Take 1 tablet by mouth 3 (Three) Times a Day As Needed for Muscle Spasms for up to 7 days.  Dispense: 21 tablet; Refill: 0    2. Strain of lumbar region, initial encounter  -     triamcinolone acetonide (KENALOG-40) injection 60 mg  -     cyclobenzaprine (FLEXERIL) 10 MG tablet; Take 1 tablet by mouth 3 (Three) Times a Day As Needed for Muscle Spasms for up to 7 days.  Dispense: 21 tablet; Refill: 0              Follow Up     Return if symptoms worsen or fail to improve.    Patient was given instructions and counseling regarding her condition or for health maintenance advice. Please see specific information pulled into the AVS if appropriate.     Panda Rinaldi  reports that she has never smoked. She has never used smokeless tobacco..

## 2023-01-19 ENCOUNTER — OFFICE VISIT (OUTPATIENT)
Dept: FAMILY MEDICINE CLINIC | Facility: CLINIC | Age: 63
End: 2023-01-19
Payer: MEDICARE

## 2023-01-19 ENCOUNTER — LAB (OUTPATIENT)
Dept: LAB | Facility: HOSPITAL | Age: 63
End: 2023-01-19
Payer: MEDICARE

## 2023-01-19 VITALS
DIASTOLIC BLOOD PRESSURE: 73 MMHG | SYSTOLIC BLOOD PRESSURE: 128 MMHG | BODY MASS INDEX: 21.68 KG/M2 | WEIGHT: 127 LBS | OXYGEN SATURATION: 99 % | HEART RATE: 66 BPM | HEIGHT: 64 IN

## 2023-01-19 DIAGNOSIS — Z78.0 MENOPAUSE: ICD-10-CM

## 2023-01-19 DIAGNOSIS — Z12.31 ENCOUNTER FOR SCREENING MAMMOGRAM FOR MALIGNANT NEOPLASM OF BREAST: ICD-10-CM

## 2023-01-19 DIAGNOSIS — Z13.29 SCREENING FOR THYROID DISORDER: ICD-10-CM

## 2023-01-19 DIAGNOSIS — J30.2 SEASONAL ALLERGIC RHINITIS, UNSPECIFIED TRIGGER: Primary | ICD-10-CM

## 2023-01-19 DIAGNOSIS — E78.5 HYPERLIPIDEMIA, UNSPECIFIED HYPERLIPIDEMIA TYPE: ICD-10-CM

## 2023-01-19 DIAGNOSIS — R00.2 PALPITATIONS: ICD-10-CM

## 2023-01-19 LAB
ALBUMIN SERPL-MCNC: 4.8 G/DL (ref 3.5–5.2)
ALBUMIN/GLOB SERPL: 1.7 G/DL
ALP SERPL-CCNC: 65 U/L (ref 39–117)
ALT SERPL W P-5'-P-CCNC: 20 U/L (ref 1–33)
ANION GAP SERPL CALCULATED.3IONS-SCNC: 5 MMOL/L (ref 5–15)
AST SERPL-CCNC: 23 U/L (ref 1–32)
BASOPHILS # BLD AUTO: 0.02 10*3/MM3 (ref 0–0.2)
BASOPHILS NFR BLD AUTO: 0.4 % (ref 0–1.5)
BILIRUB SERPL-MCNC: 0.5 MG/DL (ref 0–1.2)
BUN SERPL-MCNC: 12 MG/DL (ref 8–23)
BUN/CREAT SERPL: 17.6 (ref 7–25)
CALCIUM SPEC-SCNC: 9.6 MG/DL (ref 8.6–10.5)
CHLORIDE SERPL-SCNC: 104 MMOL/L (ref 98–107)
CHOLEST SERPL-MCNC: 235 MG/DL (ref 0–200)
CO2 SERPL-SCNC: 29 MMOL/L (ref 22–29)
CREAT SERPL-MCNC: 0.68 MG/DL (ref 0.57–1)
DEPRECATED RDW RBC AUTO: 39.7 FL (ref 37–54)
EGFRCR SERPLBLD CKD-EPI 2021: 98.6 ML/MIN/1.73
EOSINOPHIL # BLD AUTO: 0.09 10*3/MM3 (ref 0–0.4)
EOSINOPHIL NFR BLD AUTO: 1.8 % (ref 0.3–6.2)
ERYTHROCYTE [DISTWIDTH] IN BLOOD BY AUTOMATED COUNT: 12.6 % (ref 12.3–15.4)
GLOBULIN UR ELPH-MCNC: 2.8 GM/DL
GLUCOSE SERPL-MCNC: 97 MG/DL (ref 65–99)
HCT VFR BLD AUTO: 40.1 % (ref 34–46.6)
HDLC SERPL-MCNC: 44 MG/DL (ref 40–60)
HGB BLD-MCNC: 13.5 G/DL (ref 12–15.9)
IMM GRANULOCYTES # BLD AUTO: 0.01 10*3/MM3 (ref 0–0.05)
IMM GRANULOCYTES NFR BLD AUTO: 0.2 % (ref 0–0.5)
LDLC SERPL CALC-MCNC: 169 MG/DL (ref 0–100)
LDLC/HDLC SERPL: 3.78 {RATIO}
LYMPHOCYTES # BLD AUTO: 1.61 10*3/MM3 (ref 0.7–3.1)
LYMPHOCYTES NFR BLD AUTO: 31.8 % (ref 19.6–45.3)
MCH RBC QN AUTO: 29.3 PG (ref 26.6–33)
MCHC RBC AUTO-ENTMCNC: 33.7 G/DL (ref 31.5–35.7)
MCV RBC AUTO: 87.2 FL (ref 79–97)
MONOCYTES # BLD AUTO: 0.42 10*3/MM3 (ref 0.1–0.9)
MONOCYTES NFR BLD AUTO: 8.3 % (ref 5–12)
NEUTROPHILS NFR BLD AUTO: 2.91 10*3/MM3 (ref 1.7–7)
NEUTROPHILS NFR BLD AUTO: 57.5 % (ref 42.7–76)
NRBC BLD AUTO-RTO: 0 /100 WBC (ref 0–0.2)
PLATELET # BLD AUTO: 206 10*3/MM3 (ref 140–450)
PMV BLD AUTO: 11.9 FL (ref 6–12)
POTASSIUM SERPL-SCNC: 4.4 MMOL/L (ref 3.5–5.2)
PROT SERPL-MCNC: 7.6 G/DL (ref 6–8.5)
RBC # BLD AUTO: 4.6 10*6/MM3 (ref 3.77–5.28)
SODIUM SERPL-SCNC: 138 MMOL/L (ref 136–145)
T4 FREE SERPL-MCNC: 1.23 NG/DL (ref 0.93–1.7)
TRIGL SERPL-MCNC: 123 MG/DL (ref 0–150)
TSH SERPL DL<=0.05 MIU/L-ACNC: 1.18 UIU/ML (ref 0.27–4.2)
VLDLC SERPL-MCNC: 22 MG/DL (ref 5–40)
WBC NRBC COR # BLD: 5.06 10*3/MM3 (ref 3.4–10.8)

## 2023-01-19 PROCEDURE — 85025 COMPLETE CBC W/AUTO DIFF WBC: CPT

## 2023-01-19 PROCEDURE — 84443 ASSAY THYROID STIM HORMONE: CPT

## 2023-01-19 PROCEDURE — 80053 COMPREHEN METABOLIC PANEL: CPT

## 2023-01-19 PROCEDURE — 84439 ASSAY OF FREE THYROXINE: CPT

## 2023-01-19 PROCEDURE — 36415 COLL VENOUS BLD VENIPUNCTURE: CPT

## 2023-01-19 PROCEDURE — 80061 LIPID PANEL: CPT

## 2023-01-19 PROCEDURE — 99214 OFFICE O/P EST MOD 30 MIN: CPT | Performed by: NURSE PRACTITIONER

## 2023-01-19 RX ORDER — CETIRIZINE HYDROCHLORIDE 10 MG/1
10 TABLET ORAL DAILY
Qty: 90 TABLET | Refills: 0 | Status: SHIPPED | OUTPATIENT
Start: 2023-01-19

## 2023-01-19 RX ORDER — METOPROLOL SUCCINATE 25 MG/1
25 TABLET, EXTENDED RELEASE ORAL DAILY
Qty: 90 TABLET | Refills: 0 | Status: SHIPPED | OUTPATIENT
Start: 2023-01-19

## 2023-01-19 NOTE — PROGRESS NOTES
Chief Complaint  Hyperlipidemia, Palpitations (/), and Allergic Rhinitis    Subjective            Tae N Range presents to [facility]  History of Present Illness  Pt is a 6 mo f/u for HLD, palpitations, and allergic rhinitis. No issues or concerns at this time. Pt was advised by  to d/c atorvastatin due to liver enzymes being elevated. Pt has stopped snacking and has started eating healthier so she is hoping this will help her chol numbers.     Pt is due labs.    Pt is due mammogram and dexa.    Pt had colon screening 7/15/21    Last pap 5/4/21    Pt is followed by  for cardiology.    Pt is followed by Gastro for liver.    Pt is followed by Dr. Jaeger for RA.         Past Medical History:   Diagnosis Date   • Abnormal bone density screening 10/15/2014    osteoporosis- tried fosamax in the past   • Acid reflux    • Arthritis 07/15/2021    left hip pain. risk for falls.    • Colon polyp 2021   • Coronary artery disease 2018   • Fatty liver 2022   • Heart disease    • Hyperlipemia    • Osteoporosis 10/15/2014   • Palpitations    • Seasonal allergies    • Shoulder pain        Allergies   Allergen Reactions   • Hydrocodone-Acetaminophen Shortness Of Breath   • Tramadol Shortness Of Breath   • Azithromycin Unknown - Low Severity   • Latex Unknown - Low Severity        Past Surgical History:   Procedure Laterality Date   • COLONOSCOPY  10/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    • COLONOSCOPY N/A 07/15/2021    Procedure: COLONOSCOPY with polypectomy/snare;  Surgeon: Adelfo Story MD;  Location: [facility] ENDOSCOPY;  Service: General;  Laterality: N/A;  colon polyps   • ENDOSCOPY N/A 07/15/2021    Procedure: ESOPHAGOGASTRODUODENOSCOPY with biopsies;  Surgeon: Adelfo Story MD;  Location: [facility] ENDOSCOPY;  Service: General;  Laterality: N/A;  gastric nodule   • SHOULDER SURGERY Right 2014    [facility]    • UPPER GASTROINTESTINAL ENDOSCOPY          Social History     Tobacco Use   • Smoking  "status: Never   • Smokeless tobacco: Never   Substance Use Topics   • Alcohol use: Never       Family History   Problem Relation Age of Onset   • Heart attack Mother    • Colon cancer Mother 65   • Heart disease Father    • Breast cancer Sister    • Stroke Other    • Breast cancer Other    • Malig Hyperthermia Neg Hx         Current Outpatient Medications on File Prior to Visit   Medication Sig   • Acetaminophen 325 MG capsule acetaminophen 325 mg oral capsule take 2 capsules by oral route daily   Active   • Diclofenac Sodium (VOLTAREN) 1 % gel gel Apply 4 g topically to the appropriate area as directed 2 (Two) Times a Day.   • fluticasone (FLONASE) 50 MCG/ACT nasal spray 2 sprays into the nostril(s) as directed by provider Daily.   • ibuprofen (ADVIL,MOTRIN) 800 MG tablet Take 1 tablet by mouth Every 6 (Six) Hours As Needed for Mild Pain .   • [DISCONTINUED] cetirizine (zyrTEC) 10 MG tablet Take 1 tablet by mouth Daily.   • [DISCONTINUED] metoprolol succinate XL (TOPROL-XL) 25 MG 24 hr tablet Take 1 tablet by mouth Daily.   • tretinoin (RETIN-A) 0.025 % cream    • [DISCONTINUED] atorvastatin (LIPITOR) 20 MG tablet Take 1 tablet by mouth Every Night.     No current facility-administered medications on file prior to visit.       Health Maintenance Due   Topic Date Due   • TDAP/TD VACCINES (1 - Tdap) Never done   • COVID-19 Vaccine (4 - Booster for Pfizer series) 02/15/2022       Objective     /73   Pulse 66   Ht 162.6 cm (64\")   Wt 57.6 kg (127 lb)   SpO2 99%   BMI 21.80 kg/m²       Physical Exam  Constitutional:       General: She is not in acute distress.     Appearance: Normal appearance. She is not ill-appearing.   HENT:      Head: Normocephalic and atraumatic.      Right Ear: Tympanic membrane, ear canal and external ear normal.      Left Ear: Tympanic membrane, ear canal and external ear normal.      Nose: Nose normal.   Cardiovascular:      Rate and Rhythm: Normal rate and regular rhythm.      Heart " sounds: Normal heart sounds. No murmur heard.  Pulmonary:      Effort: Pulmonary effort is normal. No respiratory distress.      Breath sounds: Normal breath sounds.   Chest:      Chest wall: No tenderness.   Abdominal:      General: There is no distension.      Palpations: There is no mass.      Tenderness: There is no abdominal tenderness. There is no guarding.   Musculoskeletal:         General: No swelling or tenderness. Normal range of motion.      Cervical back: Normal range of motion and neck supple.   Skin:     General: Skin is warm and dry.      Findings: No rash.   Neurological:      General: No focal deficit present.      Mental Status: She is alert and oriented to person, place, and time. Mental status is at baseline.      Gait: Gait normal.   Psychiatric:         Mood and Affect: Mood normal.         Behavior: Behavior normal.         Thought Content: Thought content normal.         Judgment: Judgment normal.           Result Review :                           Assessment and Plan        Diagnoses and all orders for this visit:    1. Seasonal allergic rhinitis, unspecified trigger (Primary)  Comments:  stable on zyrtec 10mg, continue  Orders:  -     cetirizine (zyrTEC) 10 MG tablet; Take 1 tablet by mouth Daily.  Dispense: 90 tablet; Refill: 0    2. Palpitations  Comments:  stabl jerod metoprolol 25mg, continue, conitnue f/u with Cardiology for mgmt,   Orders:  -     metoprolol succinate XL (TOPROL-XL) 25 MG 24 hr tablet; Take 1 tablet by mouth Daily.  Dispense: 90 tablet; Refill: 0    3. Hyperlipidemia, unspecified hyperlipidemia type  Comments:  unable to tolerate statins so pt eating low chol diet  Orders:  -     CBC w AUTO Differential; Future  -     Comprehensive metabolic panel; Future  -     Lipid panel; Future    4. Encounter for screening mammogram for malignant neoplasm of breast  -     Mammo Screening Digital Tomosynthesis Bilateral With CAD; Future    5. Screening for thyroid  disorder  -     TSH; Future  -     T4, free; Future    6. Menopause  -     Cancel: DEXA Bone Density Axial  -     DEXA Bone Density Axial              Follow Up     Return in about 6 months (around 7/19/2023).    Patient was given instructions and counseling regarding her condition or for health maintenance advice. Please see specific information pulled into the AVS if appropriate.     Idrisvictor manuel EDWARD Rinaldi  reports that she has never smoked. She has never used smokeless tobacco..

## 2023-01-20 DIAGNOSIS — E78.5 HYPERLIPIDEMIA, UNSPECIFIED HYPERLIPIDEMIA TYPE: Primary | ICD-10-CM

## 2023-01-20 RX ORDER — EZETIMIBE 10 MG/1
10 TABLET ORAL DAILY
Qty: 90 TABLET | Refills: 1 | Status: SHIPPED | OUTPATIENT
Start: 2023-01-20

## 2023-02-06 NOTE — PROGRESS NOTES
Chief Complaint  4 month follow up     Panda Rinaldi is a 62 y.o. female who presents to [facility] for 4 month follow up     History of present Illness  Patient presents to the office for 4 month follow up for hepatomegaly, elevated liver enzymes, intestinal metaplasia of the stomach, and history of colon polyps.  At previous appointment it was recommended patient have repeat EGD due to intestinal metaplasia on EGD in 2021, patient declined. Patient plans to purse repeat EGD and colonoscopy in August 2023 with Dr. Story. Patient has had a 6 pound weight loss since last office visit. Most recent CMP 1/19/23 shows normal liver enzymes.  Overall patient feels that she is doing very well from a GI standpoint.  Heartburn is very infrequent since recent weight loss and dietary modification.  Denies change in bowel habits, melena, and hematochezia.    Liver work up 10/04/2022 - normal   ASMA, AMA, CHANDRIKA, immunofixation - negative   Ceruloplasmin 25, Copper 105   Alpha 1 antitrypsin 124   Iron 118, iron saturation 33, TIBC 358, ferritin 143   Acute hep panel - negative    US liver 07/08/2022 -enlarged liver with increased echogenicity likely steatosis     EGD/Colonoscopy 07/15/2021 by Dr. Story - Erythema mucosa in the lesser curvature. Biopsies consistent with reactive gastropathy and intestinal metaplasia.  4 tubular adenoma polyps in the distal sigmoid, transverse, and ascending colon. Repeat colonoscopy in 2 years.      Past Medical History:   Diagnosis Date   • Abnormal bone density screening 10/15/2014    osteoporosis- tried fosamax in the past   • Acid reflux    • Arthritis 07/15/2021    left hip pain. risk for falls.    • Colon polyp 2021   • Coronary artery disease 2018   • Fatty liver 2022   • Heart disease    • Hyperlipemia    • Osteoporosis 10/15/2014   • Palpitations    • Seasonal allergies    • Shoulder pain        Past Surgical History:   Procedure Laterality  Date   • COLONOSCOPY  10/2015    ameena sales    • COLONOSCOPY N/A 07/15/2021    Procedure: COLONOSCOPY with polypectomy/snare;  Surgeon: Adelfo Story MD;  Location: [facility] ENDOSCOPY;  Service: General;  Laterality: N/A;  colon polyps   • ENDOSCOPY N/A 07/15/2021    Procedure: ESOPHAGOGASTRODUODENOSCOPY with biopsies;  Surgeon: Adelfo Story MD;  Location: [facility] ENDOSCOPY;  Service: General;  Laterality: N/A;  gastric nodule   • SHOULDER SURGERY Right 2014    [facility]    • UPPER GASTROINTESTINAL ENDOSCOPY           Current Outpatient Medications:   •  cetirizine (zyrTEC) 10 MG tablet, Take 1 tablet by mouth Daily., Disp: 90 tablet, Rfl: 0  •  ezetimibe (Zetia) 10 MG tablet, Take 1 tablet by mouth Daily., Disp: 90 tablet, Rfl: 1  •  Lubricating Plus Eye Drops 0.5 % solution, , Disp: , Rfl:   •  metoprolol succinate XL (TOPROL-XL) 25 MG 24 hr tablet, Take 1 tablet by mouth Daily., Disp: 90 tablet, Rfl: 0  •  Acetaminophen 325 MG capsule, acetaminophen 325 mg oral capsule take 2 capsules by oral route daily   Active, Disp: , Rfl:   •  Diclofenac Sodium (VOLTAREN) 1 % gel gel, Apply 4 g topically to the appropriate area as directed 2 (Two) Times a Day., Disp: 2 g, Rfl: 2  •  fluticasone (FLONASE) 50 MCG/ACT nasal spray, 2 sprays into the nostril(s) as directed by provider Daily., Disp: 16 g, Rfl: 2  •  ibuprofen (ADVIL,MOTRIN) 800 MG tablet, Take 1 tablet by mouth Every 6 (Six) Hours As Needed for Mild Pain ., Disp: 90 tablet, Rfl: 0  •  tretinoin (RETIN-A) 0.025 % cream, , Disp: , Rfl:      Allergies   Allergen Reactions   • Hydrocodone-Acetaminophen Shortness Of Breath   • Tramadol Shortness Of Breath   • Azithromycin Unknown - Low Severity   • Latex Unknown - Low Severity       Family History   Problem Relation Age of Onset   • Heart attack Mother    • Colon cancer Mother 65   • Heart disease Father    • Breast cancer Sister    • Stroke Other    • Breast cancer Other    • Malig Hyperthermia Neg Hx         Social  "History     Social History Narrative    Lives with spouse.        Objective       Vital Signs:   /75 (BP Location: Left arm, Patient Position: Sitting, Cuff Size: Adult)   Pulse 75   Ht 162.6 cm (64.02\")   Wt 57.7 kg (127 lb 4.8 oz)   SpO2 100%   BMI 21.84 kg/m²       Physical Exam  Constitutional:       Appearance: Normal appearance.   HENT:      Head: Normocephalic.   Cardiovascular:      Rate and Rhythm: Normal rate and regular rhythm.      Heart sounds: Normal heart sounds.   Pulmonary:      Effort: Pulmonary effort is normal.      Breath sounds: Normal breath sounds.   Abdominal:      General: Bowel sounds are normal.      Palpations: Abdomen is soft.   Skin:     General: Skin is warm and dry.   Neurological:      Mental Status: She is alert and oriented to person, place, and time. Mental status is at baseline.   Psychiatric:         Mood and Affect: Mood normal.         Behavior: Behavior normal.         Thought Content: Thought content normal.         Judgment: Judgment normal.         Result Review :       CBC w/diff    CBC w/Diff 4/27/22 1/19/23   WBC 5.48 5.06   RBC 4.52 4.60   Hemoglobin 13.3 13.5   Hematocrit 39.6 40.1   MCV 87.6 87.2   MCH 29.4 29.3   MCHC 33.6 33.7   RDW 13.0 12.6   Platelets 207 206   Neutrophil Rel % 50.4 57.5   Immature Granulocyte Rel % 0.2 0.2   Lymphocyte Rel % 36.9 31.8   Monocyte Rel % 8.9 8.3   Eosinophil Rel % 2.9 1.8   Basophil Rel % 0.7 0.4           CMP    CMP 8/8/22 10/4/22 1/19/23   Glucose 106 (A)  97   BUN 11  12   Creatinine 0.74  0.68   eGFR 92.2  98.6   Sodium 140  138   Potassium 4.7  4.4   Chloride 103  104   Calcium 10.1  9.6   Total Protein 7.7 7.8 7.6   Albumin 4.90 4.80 4.8   Globulin 2.8  2.8   Total Bilirubin 0.5 0.5 0.5   Alkaline Phosphatase 74 75 65   AST (SGOT) 54 (A) 29 23   ALT (SGPT) 78 (A) 46 (A) 20   Albumin/Globulin Ratio 1.8  1.7   BUN/Creatinine Ratio 14.9  17.6   Anion Gap 12.0  5.0   (A) Abnormal value       Comments are available " for some flowsheets but are not being displayed.             Liver Workup   ALPHA -1 ANTITRYPSIN   Date Value Ref Range Status   10/04/2022 124 90 - 200 mg/dL Final     dsDNA   Date Value Ref Range Status   10/04/2022 Negative Negative Final     Expanded AMANDA Screen   Date Value Ref Range Status   10/04/2022 Negative Negative Final     Smooth Muscle Ab   Date Value Ref Range Status   10/04/2022 9 0 - 19 Units Final     Comment:                      Negative                     0 - 19                   Weak positive               20 - 30                   Moderate to strong positive     >30   Actin Antibodies are found in 52-85% of patients with   autoimmune hepatitis or chronic active hepatitis and   in 22% of patients with primary biliary cirrhosis.     Ceruloplasmin   Date Value Ref Range Status   10/04/2022 25 19 - 39 mg/dL Final     Ferritin   Date Value Ref Range Status   10/04/2022 143.00 13.00 - 150.00 ng/mL Final     Immunofixation Result, Serum   Date Value Ref Range Status   10/04/2022 Comment  Final     Comment:     No monoclonality detected.     IgG   Date Value Ref Range Status   10/04/2022 1351 586 - 1602 mg/dL Final     IgA   Date Value Ref Range Status   10/04/2022 239 87 - 352 mg/dL Final     IgM   Date Value Ref Range Status   10/04/2022 173 26 - 217 mg/dL Final     Iron   Date Value Ref Range Status   10/04/2022 118 37 - 145 mcg/dL Final     TIBC   Date Value Ref Range Status   10/04/2022 358 298 - 536 mcg/dL Final     Iron Saturation   Date Value Ref Range Status   10/04/2022 33 20 - 50 % Final     Transferrin   Date Value Ref Range Status   10/04/2022 240 200 - 360 mg/dL Final     Mitochondrial Ab   Date Value Ref Range Status   10/04/2022 <20.0 0.0 - 20.0 Units Final     Comment:                                     Negative    0.0 - 20.0                                  Equivocal  20.1 - 24.9                                  Positive         >24.9  Mitochondrial (M2) Antibodies are found in  90-96% of  patients with primary biliary cirrhosis.     Protime   Date Value Ref Range Status   10/04/2022 12.6 11.8 - 14.9 Seconds Final     INR   Date Value Ref Range Status   10/04/2022 0.93 0.86 - 1.15 Final     ALPHA-FETOPROTEIN   Date Value Ref Range Status   10/04/2022 2.03 0 - 8.3 ng/mL Final           Assessment and Plan    Diagnoses and all orders for this visit:    1. Fatty liver (Primary)    2. Hepatomegaly    3. Intestinal metaplasia of stomach    4. History of colon polyps    62-year-old patient presents to the office for 4 month follow up for hepatomegaly, elevated liver enzymes, intestinal metaplasia of the stomach, and history of colon polyps.  At previous appointment it was recommended patient have repeat EGD due to intestinal metaplasia on EGD in 2021, patient declined. Patient plans to purse repeat EGD and colonoscopy in August 2023 with Dr. Story.  Reviewed most recent liver work-up with the patient which was normal.  Discussed previous elevation likely reflective of fatty liver.  Most recent CMP shows normal liver enzymes.  Patient will continue dietary modification as this is provided relief of heartburn and normalized liver enzymes.  Overall patient is doing very well from a GI standpoint therefore will follow-up in 1 year.  Patient is agreeable to plan will call the office any questions or concerns.    Follow Up   Return in about 1 year (around 2/8/2024) for fatty liver.  Patient was given instructions and counseling regarding her condition or for health maintenance advice. Please see specific information pulled into the AVS if appropriate.

## 2023-02-08 ENCOUNTER — OFFICE VISIT (OUTPATIENT)
Dept: GASTROENTEROLOGY | Facility: CLINIC | Age: 63
End: 2023-02-08
Payer: MEDICARE

## 2023-02-08 VITALS
BODY MASS INDEX: 21.73 KG/M2 | WEIGHT: 127.3 LBS | HEART RATE: 75 BPM | SYSTOLIC BLOOD PRESSURE: 123 MMHG | DIASTOLIC BLOOD PRESSURE: 75 MMHG | OXYGEN SATURATION: 100 % | HEIGHT: 64 IN

## 2023-02-08 DIAGNOSIS — R16.0 HEPATOMEGALY: ICD-10-CM

## 2023-02-08 DIAGNOSIS — K76.0 FATTY LIVER: Primary | ICD-10-CM

## 2023-02-08 DIAGNOSIS — Z86.010 HISTORY OF COLON POLYPS: ICD-10-CM

## 2023-02-08 DIAGNOSIS — K31.A0 INTESTINAL METAPLASIA OF STOMACH: ICD-10-CM

## 2023-02-08 PROCEDURE — 99213 OFFICE O/P EST LOW 20 MIN: CPT

## 2023-02-08 RX ORDER — CARBOXYMETHYLCELLULOSE SODIUM 0.5 G/100ML
SOLUTION/ DROPS OPHTHALMIC
COMMUNITY
Start: 2023-02-02

## 2023-04-28 ENCOUNTER — HOSPITAL ENCOUNTER (OUTPATIENT)
Dept: MAMMOGRAPHY | Facility: HOSPITAL | Age: 63
Discharge: HOME | End: 2023-04-28
Payer: MEDICARE

## 2023-04-28 ENCOUNTER — HOSPITAL ENCOUNTER (OUTPATIENT)
Dept: BONE DENSITY | Facility: HOSPITAL | Age: 63
Discharge: HOME | End: 2023-04-28
Payer: MEDICARE

## 2023-04-28 DIAGNOSIS — Z12.31 ENCOUNTER FOR SCREENING MAMMOGRAM FOR MALIGNANT NEOPLASM OF BREAST: ICD-10-CM

## 2023-04-28 PROCEDURE — 77067 SCR MAMMO BI INCL CAD: CPT

## 2023-04-28 PROCEDURE — 77063 BREAST TOMOSYNTHESIS BI: CPT

## 2023-04-28 PROCEDURE — 77080 DXA BONE DENSITY AXIAL: CPT

## 2023-05-01 DIAGNOSIS — E78.5 HYPERLIPIDEMIA, UNSPECIFIED HYPERLIPIDEMIA TYPE: ICD-10-CM

## 2023-05-01 DIAGNOSIS — J30.2 SEASONAL ALLERGIC RHINITIS, UNSPECIFIED TRIGGER: ICD-10-CM

## 2023-05-01 DIAGNOSIS — R00.2 PALPITATIONS: ICD-10-CM

## 2023-05-01 DIAGNOSIS — M81.0 OSTEOPOROSIS, UNSPECIFIED OSTEOPOROSIS TYPE, UNSPECIFIED PATHOLOGICAL FRACTURE PRESENCE: Primary | ICD-10-CM

## 2023-05-01 RX ORDER — EZETIMIBE 10 MG/1
10 TABLET ORAL DAILY
Qty: 90 TABLET | Refills: 1 | Status: SHIPPED | OUTPATIENT
Start: 2023-05-01

## 2023-05-01 RX ORDER — ALENDRONATE SODIUM 70 MG/1
70 TABLET ORAL
Qty: 13 TABLET | Refills: 1 | Status: SHIPPED | OUTPATIENT
Start: 2023-05-01

## 2023-05-01 RX ORDER — METOPROLOL SUCCINATE 25 MG/1
25 TABLET, EXTENDED RELEASE ORAL DAILY
Qty: 90 TABLET | Refills: 1 | Status: SHIPPED | OUTPATIENT
Start: 2023-05-01

## 2023-05-01 RX ORDER — CETIRIZINE HYDROCHLORIDE 10 MG/1
10 TABLET ORAL DAILY
Qty: 90 TABLET | Refills: 1 | Status: SHIPPED | OUTPATIENT
Start: 2023-05-01

## 2023-05-19 ENCOUNTER — OFFICE VISIT (OUTPATIENT)
Dept: FAMILY MEDICINE CLINIC | Facility: CLINIC | Age: 63
End: 2023-05-19
Payer: OTHER GOVERNMENT

## 2023-05-19 VITALS
HEIGHT: 64 IN | HEART RATE: 57 BPM | BODY MASS INDEX: 21.51 KG/M2 | WEIGHT: 126 LBS | SYSTOLIC BLOOD PRESSURE: 118 MMHG | TEMPERATURE: 97.7 F | OXYGEN SATURATION: 97 % | DIASTOLIC BLOOD PRESSURE: 70 MMHG

## 2023-05-19 DIAGNOSIS — M81.0 AGE-RELATED OSTEOPOROSIS WITHOUT CURRENT PATHOLOGICAL FRACTURE: ICD-10-CM

## 2023-05-19 DIAGNOSIS — G89.29 CHRONIC LEFT-SIDED LOW BACK PAIN WITH LEFT-SIDED SCIATICA: ICD-10-CM

## 2023-05-19 DIAGNOSIS — M54.42 CHRONIC LEFT-SIDED LOW BACK PAIN WITH LEFT-SIDED SCIATICA: ICD-10-CM

## 2023-05-19 DIAGNOSIS — Z12.11 SCREENING FOR COLON CANCER: ICD-10-CM

## 2023-05-19 DIAGNOSIS — E78.2 MIXED HYPERLIPIDEMIA: Primary | ICD-10-CM

## 2023-05-19 PROCEDURE — 99203 OFFICE O/P NEW LOW 30 MIN: CPT | Performed by: FAMILY MEDICINE

## 2023-05-19 RX ORDER — CYCLOBENZAPRINE HCL 10 MG
TABLET ORAL
COMMUNITY
Start: 2023-04-17

## 2023-05-19 RX ORDER — LIDOCAINE 50 MG/G
PATCH TOPICAL
COMMUNITY
Start: 2023-04-17

## 2023-05-19 RX ORDER — LANOLIN ALCOHOL/MO/W.PET/CERES
500 CREAM (GRAM) TOPICAL NIGHTLY
Qty: 90 CAPSULE | Refills: 0 | Status: SHIPPED | OUTPATIENT
Start: 2023-05-19 | End: 2023-08-17

## 2023-05-19 NOTE — PROGRESS NOTES
Chief Complaint  Chief Complaint   Patient presents with   • Establish Care   • Colonoscopy referral     2 year fu colonoscopy with Dr. Story       HPI:  Panda Rinaldi presents to [facility]    The patient needs a referral for a colonoscopy. Her last colonoscopy was 2 years ago. Her mother had colon cancer. She sees Dr. Alvarenga for gastroenterology. The patient admits to taking Fosamax for her bones. She experiences an upset stomach with Fosamax.     The patient admits to experiencing palpitations. She sees Dr. Figueredo every 6 months for this. She currently takes Toprol.     The patient currently experiences arthritis and sees Dr. Daniel for this. She has arthritis in her left hip that radiates into her lower extremity. Her last x-ray was approximately 2 months ago. She denies any hip injuries. She admits to back sciatic pain. The patient injured her back when she was a teenager. She has received a steroid injection in the past for this. The patient admits to weakness in her left lower extremity and numbness in her buttocks.     Procedures     Past History:  Medical History: has a past medical history of Abnormal bone density screening (10/15/2014), Acid reflux, Arthritis (07/15/2021), Colon polyp (2021), Coronary artery disease (2018), Fatty liver (2022), Headache (06/01/1993), Heart disease, Hyperlipemia, Osteoporosis (10/15/2014), Palpitations, Seasonal allergies, and Shoulder pain.   Surgical History: has a past surgical history that includes Colonoscopy (10/2015); Shoulder surgery (Right, 2014); Esophagogastroduodenoscopy (N/A, 07/15/2021); Colonoscopy (N/A, 07/15/2021); and Upper gastrointestinal endoscopy.   Family History: family history includes Arthritis in her mother; Breast cancer in her sister and another family member; Colon cancer (age of onset: 65) in her mother; Heart attack in her mother; Heart disease in her father; Stroke in an other family member.    Social History: reports that she quit smoking about 37 years ago. Her smoking use included cigarettes. She started smoking about 42 years ago. She has never been exposed to tobacco smoke. She has never used smokeless tobacco. She reports current alcohol use. She reports that she does not use drugs.  Immunization History   Administered Date(s) Administered   • COVID-19 (PFIZER) Purple Cap Monovalent 03/16/2021, 04/06/2021, 12/21/2021   • FluLaval/Fluzone >6mos 10/13/2022   • Fluzone Quad >6mos (Multi-dose) 10/01/2020   • Influenza, Unspecified 10/01/2020   • Shingrix 05/25/2021, 09/08/2021         Allergies: Hydrocodone-acetaminophen, Tramadol, Codeine, Azithromycin, and Latex     Medications:  Current Outpatient Medications on File Prior to Visit   Medication Sig Dispense Refill   • Acetaminophen 325 MG capsule acetaminophen 325 mg oral capsule take 2 capsules by oral route daily   Active     • cetirizine (zyrTEC) 10 MG tablet Take 1 tablet by mouth Daily. 90 tablet 1   • cyclobenzaprine (FLEXERIL) 10 MG tablet      • Diclofenac Sodium (VOLTAREN) 1 % gel gel Apply 4 g topically to the appropriate area as directed 2 (Two) Times a Day. 2 g 2   • ezetimibe (Zetia) 10 MG tablet Take 1 tablet by mouth Daily. 90 tablet 1   • fluticasone (FLONASE) 50 MCG/ACT nasal spray 2 sprays into the nostril(s) as directed by provider Daily. 16 g 2   • ibuprofen (ADVIL,MOTRIN) 800 MG tablet Take 1 tablet by mouth Every 6 (Six) Hours As Needed for Mild Pain . 90 tablet 0   • lidocaine (LIDODERM) 5 %      • Lubricating Plus Eye Drops 0.5 % solution      • metoprolol succinate XL (TOPROL-XL) 25 MG 24 hr tablet Take 1 tablet by mouth Daily. 90 tablet 1   • [DISCONTINUED] alendronate (Fosamax) 70 MG tablet Take 1 tablet by mouth Every 7 (Seven) Days. 13 tablet 1   • [DISCONTINUED] tretinoin (RETIN-A) 0.025 % cream        No current facility-administered medications on file prior to visit.        Health Maintenance Due   Topic Date Due   •  "Pneumococcal Vaccine 0-64 (1 - PCV) Never done   • TDAP/TD VACCINES (1 - Tdap) Never done   • COVID-19 Vaccine (4 - Booster for Pfizer series) 02/15/2022       Vital Signs:   Vitals:    05/19/23 0849   BP: 118/70   Pulse: 57   Temp: 97.7 °F (36.5 °C)   SpO2: 97%   Weight: 57.2 kg (126 lb)   Height: 162.6 cm (64\")      Body mass index is 21.63 kg/m².     ROS:  Review of Systems   Constitutional: Negative for fatigue and fever.   HENT: Negative for congestion, ear pain and sinus pressure.    Respiratory: Negative for cough, chest tightness and shortness of breath.    Cardiovascular: Negative for chest pain, palpitations and leg swelling.   Gastrointestinal: Negative for abdominal pain and diarrhea.   Genitourinary: Negative for dysuria and frequency.   Musculoskeletal: Positive for back pain.   Neurological: Negative for speech difficulty, headache and confusion.   Psychiatric/Behavioral: Negative for agitation and behavioral problems.          Physical Exam  Vitals reviewed.   Constitutional:       Appearance: Normal appearance.   HENT:      Right Ear: Tympanic membrane normal.      Left Ear: Tympanic membrane normal.      Nose: Nose normal.   Eyes:      Extraocular Movements: Extraocular movements intact.      Conjunctiva/sclera: Conjunctivae normal.      Pupils: Pupils are equal, round, and reactive to light.   Cardiovascular:      Rate and Rhythm: Normal rate and regular rhythm.   Pulmonary:      Effort: Pulmonary effort is normal.      Breath sounds: Normal breath sounds.   Abdominal:      General: Bowel sounds are normal.   Musculoskeletal:         General: Normal range of motion.      Cervical back: Normal range of motion.   Skin:     General: Skin is warm and dry.   Neurological:      General: No focal deficit present.      Mental Status: She is alert and oriented to person, place, and time.   Psychiatric:         Mood and Affect: Mood normal.         Behavior: Behavior normal.          Result Review "   Comprehensive metabolic panel (01/19/2023 11:37)  Lipid panel (01/19/2023 11:37)  TSH (01/19/2023 11:37)  T4, free (01/19/2023 11:37)  CBC w AUTO Differential (01/19/2023 11:37)       Diagnoses and all orders for this visit:    1. Mixed hyperlipidemia (Primary)  -     niacin (NIACIN SR,NIASPAN ER) 500 MG CR capsule; Take 1 capsule by mouth Every Night for 90 days.  Dispense: 90 capsule; Refill: 0    2. Screening for colon cancer  -     Amb Referral for Screening Colonoscopy    3. Chronic left-sided low back pain with left-sided sciatica  -     MRI Lumbar Spine Without Contrast; Future    4. Age-related osteoporosis without current pathological fracture  -     Ambulatory Referral to [facility] For Infusion Treatment    Other orders  -     denosumab (PROLIA) syringe 60 mg    Mixed hyperlipidemia-  Niacin 500 mg    Age related osteoporosis-  Discontinue Fosamax  Referral for infusion treatment    Chronic left-sided low back pain-  MRI ordered.       Smoking Cessation:    Panda Rinaldi  reports that she quit smoking about 37 years ago. Her smoking use included cigarettes. She started smoking about 42 years ago. She has never been exposed to tobacco smoke. She has never used smokeless tobacco.        Follow Up   Return in about 4 weeks (around 6/16/2023).  Patient was given instructions and counseling regarding her condition or for health maintenance advice. Please see specific information pulled into the AVS if appropriate.       Lorri White MD     Transcribed from ambient dictation for Lorri White MD by Kandi Patrick.  05/19/23   11:45 EDT    Patient or patient representative verbalized consent to the visit recording.  I have personally performed the services described in this document as transcribed by the above individual, and it is both accurate and complete.

## 2023-06-01 ENCOUNTER — HOSPITAL ENCOUNTER (OUTPATIENT)
Dept: INFUSION THERAPY | Facility: HOSPITAL | Age: 63
End: 2023-06-01
Payer: MEDICARE

## 2023-06-12 ENCOUNTER — HOSPITAL ENCOUNTER (OUTPATIENT)
Dept: INFUSION THERAPY | Facility: HOSPITAL | Age: 63
Discharge: HOME | End: 2023-06-12
Admitting: FAMILY MEDICINE
Payer: MEDICARE

## 2023-06-12 VITALS
RESPIRATION RATE: 16 BRPM | SYSTOLIC BLOOD PRESSURE: 136 MMHG | BODY MASS INDEX: 21.64 KG/M2 | WEIGHT: 126.76 LBS | OXYGEN SATURATION: 100 % | DIASTOLIC BLOOD PRESSURE: 79 MMHG | TEMPERATURE: 97.8 F | HEART RATE: 65 BPM | HEIGHT: 64 IN

## 2023-06-12 DIAGNOSIS — M81.0 AGE-RELATED OSTEOPOROSIS WITHOUT CURRENT PATHOLOGICAL FRACTURE: Primary | ICD-10-CM

## 2023-06-12 LAB
25(OH)D3 SERPL-MCNC: 21.6 NG/ML (ref 30–100)
ALBUMIN SERPL-MCNC: 4.7 G/DL (ref 3.5–5.2)
ALBUMIN/GLOB SERPL: 1.6 G/DL
ALP SERPL-CCNC: 76 U/L (ref 39–117)
ALT SERPL W P-5'-P-CCNC: 17 U/L (ref 1–33)
ANION GAP SERPL CALCULATED.3IONS-SCNC: 8.6 MMOL/L (ref 5–15)
AST SERPL-CCNC: 18 U/L (ref 1–32)
BILIRUB SERPL-MCNC: 0.3 MG/DL (ref 0–1.2)
BUN SERPL-MCNC: 11 MG/DL (ref 8–23)
BUN/CREAT SERPL: 16.7 (ref 7–25)
CALCIUM SPEC-SCNC: 9.6 MG/DL (ref 8.6–10.5)
CHLORIDE SERPL-SCNC: 104 MMOL/L (ref 98–107)
CO2 SERPL-SCNC: 26.4 MMOL/L (ref 22–29)
CREAT SERPL-MCNC: 0.66 MG/DL (ref 0.57–1)
EGFRCR SERPLBLD CKD-EPI 2021: 99.3 ML/MIN/1.73
GLOBULIN UR ELPH-MCNC: 2.9 GM/DL
GLUCOSE SERPL-MCNC: 102 MG/DL (ref 65–99)
MAGNESIUM SERPL-MCNC: 2.1 MG/DL (ref 1.6–2.4)
PHOSPHATE SERPL-MCNC: 3.8 MG/DL (ref 2.5–4.5)
POTASSIUM SERPL-SCNC: 4 MMOL/L (ref 3.5–5.2)
PROT SERPL-MCNC: 7.6 G/DL (ref 6–8.5)
SODIUM SERPL-SCNC: 139 MMOL/L (ref 136–145)

## 2023-06-12 PROCEDURE — 96372 THER/PROPH/DIAG INJ SC/IM: CPT

## 2023-06-12 PROCEDURE — 82306 VITAMIN D 25 HYDROXY: CPT | Performed by: FAMILY MEDICINE

## 2023-06-12 PROCEDURE — 25010000002 DENOSUMAB 60 MG/ML SOLUTION PREFILLED SYRINGE: Performed by: FAMILY MEDICINE

## 2023-06-12 PROCEDURE — 84100 ASSAY OF PHOSPHORUS: CPT | Performed by: FAMILY MEDICINE

## 2023-06-12 PROCEDURE — 83735 ASSAY OF MAGNESIUM: CPT | Performed by: FAMILY MEDICINE

## 2023-06-12 PROCEDURE — 80053 COMPREHEN METABOLIC PANEL: CPT | Performed by: FAMILY MEDICINE

## 2023-06-12 RX ADMIN — DENOSUMAB 60 MG: 60 INJECTION SUBCUTANEOUS at 11:19

## 2023-07-24 ENCOUNTER — OFFICE VISIT (OUTPATIENT)
Dept: FAMILY MEDICINE CLINIC | Facility: CLINIC | Age: 63
End: 2023-07-24
Payer: MEDICARE

## 2023-07-24 VITALS
BODY MASS INDEX: 21.68 KG/M2 | HEART RATE: 81 BPM | HEIGHT: 64 IN | OXYGEN SATURATION: 97 % | SYSTOLIC BLOOD PRESSURE: 120 MMHG | TEMPERATURE: 98.3 F | DIASTOLIC BLOOD PRESSURE: 70 MMHG | WEIGHT: 127 LBS

## 2023-07-24 DIAGNOSIS — E78.2 MIXED HYPERLIPIDEMIA: Primary | ICD-10-CM

## 2023-07-24 DIAGNOSIS — M54.42 CHRONIC LEFT-SIDED LOW BACK PAIN WITH LEFT-SIDED SCIATICA: ICD-10-CM

## 2023-07-24 DIAGNOSIS — E78.5 HYPERLIPIDEMIA, UNSPECIFIED HYPERLIPIDEMIA TYPE: ICD-10-CM

## 2023-07-24 DIAGNOSIS — J30.2 SEASONAL ALLERGIC RHINITIS, UNSPECIFIED TRIGGER: ICD-10-CM

## 2023-07-24 DIAGNOSIS — G89.29 CHRONIC LEFT-SIDED LOW BACK PAIN WITH LEFT-SIDED SCIATICA: ICD-10-CM

## 2023-07-24 DIAGNOSIS — R00.2 PALPITATIONS: ICD-10-CM

## 2023-07-24 PROCEDURE — 99214 OFFICE O/P EST MOD 30 MIN: CPT | Performed by: FAMILY MEDICINE

## 2023-07-24 PROCEDURE — 1159F MED LIST DOCD IN RCRD: CPT | Performed by: FAMILY MEDICINE

## 2023-07-24 PROCEDURE — 1160F RVW MEDS BY RX/DR IN RCRD: CPT | Performed by: FAMILY MEDICINE

## 2023-07-24 RX ORDER — EZETIMIBE 10 MG/1
10 TABLET ORAL DAILY
Qty: 90 TABLET | Refills: 1 | Status: SHIPPED | OUTPATIENT
Start: 2023-07-24

## 2023-07-24 RX ORDER — LIDOCAINE 50 MG/G
1 PATCH TOPICAL EVERY 24 HOURS
Qty: 90 PATCH | Refills: 3 | Status: SHIPPED | OUTPATIENT
Start: 2023-07-24 | End: 2023-10-22

## 2023-07-24 RX ORDER — FOLIC ACID 1 MG/1
1 TABLET ORAL DAILY
Qty: 90 TABLET | Refills: 3 | Status: SHIPPED | OUTPATIENT
Start: 2023-07-24 | End: 2023-10-22

## 2023-07-24 RX ORDER — METOPROLOL SUCCINATE 25 MG/1
25 TABLET, EXTENDED RELEASE ORAL DAILY
Qty: 90 TABLET | Refills: 1 | Status: SHIPPED | OUTPATIENT
Start: 2023-07-24

## 2023-07-24 RX ORDER — CETIRIZINE HYDROCHLORIDE 10 MG/1
10 TABLET ORAL DAILY
Qty: 90 TABLET | Refills: 1 | Status: SHIPPED | OUTPATIENT
Start: 2023-07-24

## 2023-07-24 RX ORDER — ERYTHROMYCIN 5 MG/G
OINTMENT OPHTHALMIC
COMMUNITY
Start: 2023-05-19 | End: 2023-07-24

## 2023-07-24 RX ORDER — CARBOXYMETHYLCELLULOSE SODIUM 0.5 G/100ML
2 SOLUTION/ DROPS OPHTHALMIC DAILY PRN
Qty: 70 EACH | Refills: 3 | Status: SHIPPED | OUTPATIENT
Start: 2023-07-24 | End: 2023-10-22

## 2023-07-24 NOTE — PROGRESS NOTES
Chief Complaint  Chief Complaint   Patient presents with    MRI results    Med Refill    Alopecia    Lab results       HPI:  Panda Rinaldi presents to [facility]    The patient presents for a follow-up.     The patient is doing well.  She is accompanied by an adult male.    She saw MADELYN Crowe on 02/08/2023. She has a fatty liver.    Chronic Low Back Pain radiating down her left leg- She has arthritis is her back with bulging discs in her lumbar spine. MRI completed and reviewed in detail with patient and her .    The patient is experiencing hair loss.  Labs are normal so we will treat with folic acid.    She needs refills on her medications.    Procedures     Past History:  Medical History: has a past medical history of Abnormal bone density screening (10/15/2014), Acid reflux, Allergic (10year), Arthritis (07/15/2021), Colon polyp (2021), Coronary artery disease (2018), Fatty liver (2022), Headache (06/01/1993), Heart disease, Hyperlipemia, Osteopenia, Osteoporosis (10/15/2014), Palpitations, Seasonal allergies, and Shoulder pain.   Surgical History: has a past surgical history that includes Colonoscopy (10/2015); Shoulder surgery (Right, 2014); Esophagogastroduodenoscopy (N/A, 07/15/2021); Colonoscopy (N/A, 07/15/2021); and Upper gastrointestinal endoscopy.   Family History: family history includes Arthritis in her mother; Breast cancer in her sister and another family member; Colon cancer (age of onset: 65) in her mother; Heart attack in her mother; Heart disease in her father; Stroke in an other family member.   Social History: reports that she quit smoking about 37 years ago. Her smoking use included cigarettes. She started smoking about 42 years ago. She has never been exposed to tobacco smoke. She has never used smokeless tobacco. She reports current alcohol use. She reports that she does not use drugs.  Immunization History   Administered Date(s) Administered  "   COVID-19 (PFIZER) Purple Cap Monovalent 03/16/2021, 04/06/2021, 12/21/2021    FluLaval/Fluzone >6mos 10/13/2022    Fluzone Quad >6mos (Multi-dose) 10/01/2020    Influenza, Unspecified 10/01/2020    Shingrix 05/25/2021, 09/08/2021         Allergies: Hydrocodone-acetaminophen, Tramadol, Codeine, Azithromycin, and Latex     Medications:  Current Outpatient Medications on File Prior to Visit   Medication Sig Dispense Refill    Acetaminophen 325 MG capsule acetaminophen 325 mg oral capsule take 2 capsules by oral route daily   Active      cyclobenzaprine (FLEXERIL) 10 MG tablet       Diclofenac Sodium (VOLTAREN) 1 % gel gel Apply 4 g topically to the appropriate area as directed 2 (Two) Times a Day. 2 g 2    ibuprofen (ADVIL,MOTRIN) 800 MG tablet Take 1 tablet by mouth Every 6 (Six) Hours As Needed for Mild Pain . 90 tablet 0    niacin (NIACIN SR,NIASPAN ER) 500 MG CR capsule Take 1 capsule by mouth Every Night for 90 days. 90 capsule 0     No current facility-administered medications on file prior to visit.        Health Maintenance Due   Topic Date Due    Pneumococcal Vaccine 0-64 (1 - PCV) Never done    TDAP/TD VACCINES (1 - Tdap) Never done    COVID-19 Vaccine (4 - Pfizer series) 02/15/2022    COLORECTAL CANCER SCREENING  07/15/2023       Vital Signs:   Vitals:    07/24/23 1433   BP: 120/70   Pulse: 81   Temp: 98.3 °F (36.8 °C)   SpO2: 97%   Weight: 57.6 kg (127 lb)   Height: 162.6 cm (64\")      Body mass index is 21.8 kg/m².     ROS:  Review of Systems   Constitutional:  Negative for fatigue, fever and unexpected weight loss.   HENT:  Negative for congestion, ear pain and sinus pressure.    Respiratory:  Negative for cough, chest tightness and shortness of breath.    Cardiovascular:  Negative for chest pain, palpitations and leg swelling.   Gastrointestinal:  Negative for abdominal pain and diarrhea.   Genitourinary:  Positive for pelvic pain. Negative for dysuria and frequency.   Musculoskeletal:  Positive for " back pain.   Neurological:  Positive for weakness and numbness. Negative for speech difficulty, headache and confusion.   Psychiatric/Behavioral:  Negative for agitation and behavioral problems.         Physical Exam  Vitals reviewed.   Constitutional:       Appearance: Normal appearance.   HENT:      Right Ear: Tympanic membrane normal.      Left Ear: Tympanic membrane normal.      Nose: Nose normal.   Eyes:      Extraocular Movements: Extraocular movements intact.      Conjunctiva/sclera: Conjunctivae normal.      Pupils: Pupils are equal, round, and reactive to light.   Cardiovascular:      Rate and Rhythm: Normal rate and regular rhythm.   Pulmonary:      Effort: Pulmonary effort is normal.      Breath sounds: Normal breath sounds.   Abdominal:      General: Bowel sounds are normal.   Musculoskeletal:         General: Normal range of motion.      Cervical back: Normal range of motion.   Skin:     General: Skin is warm and dry.   Neurological:      General: No focal deficit present.      Mental Status: She is alert and oriented to person, place, and time.   Psychiatric:         Mood and Affect: Mood normal.         Behavior: Behavior normal.        Result Review   MRI Lumbar Spine Without Contrast (06/21/2023 19:17)   DEXA Bone Density Axial (04/28/2023 13:03)        Diagnoses and all orders for this visit:    1. Mixed hyperlipidemia (Primary)  -     Lipid Panel    2. Palpitations  Comments:  stabl jerod metoprolol 25mg, continue, conitnue f/u with Cardiology for mgmt,   Orders:  -     metoprolol succinate XL (TOPROL-XL) 25 MG 24 hr tablet; Take 1 tablet by mouth Daily.  Dispense: 90 tablet; Refill: 1    3. Hyperlipidemia, unspecified hyperlipidemia type  -     ezetimibe (Zetia) 10 MG tablet; Take 1 tablet by mouth Daily.  Dispense: 90 tablet; Refill: 1    4. Seasonal allergic rhinitis, unspecified trigger  Comments:  stable on zyrtec 10mg, continue  Orders:  -     cetirizine (zyrTEC) 10 MG tablet; Take 1  tablet by mouth Daily.  Dispense: 90 tablet; Refill: 1    5. Chronic left-sided low back pain with left-sided sciatica  -     Ambulatory Referral to Neurosurgery    Other orders  -     folic acid (FOLVITE) 1 MG tablet; Take 1 tablet by mouth Daily for 90 days.  Dispense: 90 tablet; Refill: 3  -     Lubricating Plus Eye Drops 0.5 % solution; Administer 2 drops to both eyes Daily As Needed for Dry Eyes for up to 90 days.  Dispense: 70 each; Refill: 3  -     lidocaine (LIDODERM) 5 %; Place 1 patch on the skin as directed by provider Daily for 90 days.  Dispense: 90 patch; Refill: 3          Smoking Cessation:    Panda Rinaldi  reports that she quit smoking about 37 years ago. Her smoking use included cigarettes. She started smoking about 42 years ago. She has never been exposed to tobacco smoke. She has never used smokeless tobacco.          Follow Up   Return in about 3 months (around 10/24/2023).  Patient was given instructions and counseling regarding her condition or for health maintenance advice. Please see specific information pulled into the AVS if appropriate.       Andrew Haro submitted by the patient for this visit:  Primary Reason for Visit (Submitted on 7/24/2023)  What is the primary reason for your visit?: Back Pain  Back Pain Questionnaire (Submitted on 7/24/2023)  Chief Complaint: Back pain  Chronicity: chronic  Onset: more than 1 year ago  Frequency: 2 to 4 times per day  Progression since onset: waxing and waning  Pain location: sacro-iliac  Pain quality: cramping  Radiates to: left foot, left thigh  Pain - numeric: 4/10  Aggravated by: position, standing  Stiffness is present: in the morning  leg pain: Yes  paresthesias: Yes  perianal numbness: No  tingling: Yes  Risk factors: history of cancer, history of steroid use    Transcribed from ambient dictation for Lorri White MD by Paulina Kiran.  07/24/23   17:50 EDT    Patient or patient representative verbalized consent to the visit  recording.  I have personally performed the services described in this document as transcribed by the above individual, and it is both accurate and complete.

## 2023-07-25 ENCOUNTER — CLINICAL SUPPORT (OUTPATIENT)
Dept: FAMILY MEDICINE CLINIC | Facility: CLINIC | Age: 63
End: 2023-07-25
Payer: MEDICARE

## 2023-07-25 DIAGNOSIS — M81.0 AGE-RELATED OSTEOPOROSIS WITHOUT CURRENT PATHOLOGICAL FRACTURE: ICD-10-CM

## 2023-07-25 LAB
25(OH)D3 SERPL-MCNC: 20.2 NG/ML (ref 30–100)
ALBUMIN SERPL-MCNC: 4.4 G/DL (ref 3.5–5.2)
ALBUMIN/GLOB SERPL: 1.4 G/DL
ALP SERPL-CCNC: 61 U/L (ref 39–117)
ALT SERPL W P-5'-P-CCNC: 17 U/L (ref 1–33)
ANION GAP SERPL CALCULATED.3IONS-SCNC: 8.1 MMOL/L (ref 5–15)
AST SERPL-CCNC: 16 U/L (ref 1–32)
BILIRUB SERPL-MCNC: 0.5 MG/DL (ref 0–1.2)
BUN SERPL-MCNC: 10 MG/DL (ref 8–23)
BUN/CREAT SERPL: 14.1 (ref 7–25)
CALCIUM SPEC-SCNC: 9 MG/DL (ref 8.6–10.5)
CHLORIDE SERPL-SCNC: 106 MMOL/L (ref 98–107)
CHOLEST SERPL-MCNC: 191 MG/DL (ref 0–200)
CO2 SERPL-SCNC: 26.9 MMOL/L (ref 22–29)
CREAT SERPL-MCNC: 0.71 MG/DL (ref 0.57–1)
EGFRCR SERPLBLD CKD-EPI 2021: 96.3 ML/MIN/1.73
GLOBULIN UR ELPH-MCNC: 3.1 GM/DL
GLUCOSE SERPL-MCNC: 103 MG/DL (ref 65–99)
HDLC SERPL-MCNC: 35 MG/DL (ref 40–60)
LDLC SERPL CALC-MCNC: 119 MG/DL (ref 0–100)
LDLC/HDLC SERPL: 3.27 {RATIO}
MAGNESIUM SERPL-MCNC: 2.3 MG/DL (ref 1.6–2.4)
PHOSPHATE SERPL-MCNC: 2.9 MG/DL (ref 2.5–4.5)
POTASSIUM SERPL-SCNC: 4.1 MMOL/L (ref 3.5–5.2)
PROT SERPL-MCNC: 7.5 G/DL (ref 6–8.5)
SODIUM SERPL-SCNC: 141 MMOL/L (ref 136–145)
TRIGL SERPL-MCNC: 208 MG/DL (ref 0–150)
VLDLC SERPL-MCNC: 37 MG/DL (ref 5–40)

## 2023-07-25 PROCEDURE — 80061 LIPID PANEL: CPT | Performed by: FAMILY MEDICINE

## 2023-07-25 PROCEDURE — 84100 ASSAY OF PHOSPHORUS: CPT | Performed by: FAMILY MEDICINE

## 2023-07-25 PROCEDURE — 36415 COLL VENOUS BLD VENIPUNCTURE: CPT | Performed by: FAMILY MEDICINE

## 2023-07-25 PROCEDURE — 83735 ASSAY OF MAGNESIUM: CPT | Performed by: FAMILY MEDICINE

## 2023-07-25 PROCEDURE — 80053 COMPREHEN METABOLIC PANEL: CPT | Performed by: FAMILY MEDICINE

## 2023-07-25 PROCEDURE — 82306 VITAMIN D 25 HYDROXY: CPT | Performed by: FAMILY MEDICINE

## 2023-07-25 RX ORDER — ERGOCALCIFEROL 1.25 MG/1
50000 CAPSULE ORAL WEEKLY
Qty: 12 CAPSULE | Refills: 3 | Status: SHIPPED | OUTPATIENT
Start: 2023-07-25 | End: 2023-10-23

## 2023-08-02 ENCOUNTER — OFFICE VISIT (OUTPATIENT)
Dept: NEUROSURGERY | Facility: CLINIC | Age: 63
End: 2023-08-02
Payer: MEDICARE

## 2023-08-02 ENCOUNTER — PATIENT ROUNDING (BHMG ONLY) (OUTPATIENT)
Dept: NEUROLOGY | Facility: CLINIC | Age: 63
End: 2023-08-02
Payer: MEDICARE

## 2023-08-02 VITALS
SYSTOLIC BLOOD PRESSURE: 119 MMHG | HEART RATE: 62 BPM | HEIGHT: 64 IN | WEIGHT: 127 LBS | DIASTOLIC BLOOD PRESSURE: 81 MMHG | BODY MASS INDEX: 21.68 KG/M2

## 2023-08-02 DIAGNOSIS — M48.061 FORAMINAL STENOSIS OF LUMBAR REGION: ICD-10-CM

## 2023-08-02 DIAGNOSIS — M51.26 HERNIATED NUCLEUS PULPOSUS, L4-5 LEFT: ICD-10-CM

## 2023-08-02 DIAGNOSIS — G89.29 CHRONIC MIDLINE LOW BACK PAIN WITH LEFT-SIDED SCIATICA: ICD-10-CM

## 2023-08-02 DIAGNOSIS — M54.42 CHRONIC MIDLINE LOW BACK PAIN WITH LEFT-SIDED SCIATICA: ICD-10-CM

## 2023-08-02 DIAGNOSIS — M51.26 HERNIATED NUCLEUS PULPOSUS, L3-4 LEFT: Primary | ICD-10-CM

## 2023-08-07 ENCOUNTER — LAB (OUTPATIENT)
Dept: LAB | Facility: HOSPITAL | Age: 63
End: 2023-08-07
Payer: MEDICARE

## 2023-08-07 ENCOUNTER — TRANSCRIBE ORDERS (OUTPATIENT)
Dept: ADMINISTRATIVE | Facility: HOSPITAL | Age: 63
End: 2023-08-07
Payer: MEDICARE

## 2023-08-07 DIAGNOSIS — R07.9 CHEST PAIN, UNSPECIFIED TYPE: Primary | ICD-10-CM

## 2023-08-07 DIAGNOSIS — E78.5 HYPERLIPIDEMIA, UNSPECIFIED HYPERLIPIDEMIA TYPE: ICD-10-CM

## 2023-08-07 DIAGNOSIS — R07.9 CHEST PAIN, UNSPECIFIED TYPE: ICD-10-CM

## 2023-08-07 LAB
ALBUMIN SERPL-MCNC: 4.8 G/DL (ref 3.5–5.2)
ALBUMIN/GLOB SERPL: 1.8 G/DL
ALP SERPL-CCNC: 54 U/L (ref 39–117)
ALT SERPL W P-5'-P-CCNC: 19 U/L (ref 1–33)
ANION GAP SERPL CALCULATED.3IONS-SCNC: 11.3 MMOL/L (ref 5–15)
AST SERPL-CCNC: 21 U/L (ref 1–32)
BILIRUB SERPL-MCNC: 0.6 MG/DL (ref 0–1.2)
BUN SERPL-MCNC: 10 MG/DL (ref 8–23)
BUN/CREAT SERPL: 14.5 (ref 7–25)
CALCIUM SPEC-SCNC: 9.5 MG/DL (ref 8.6–10.5)
CHLORIDE SERPL-SCNC: 102 MMOL/L (ref 98–107)
CHOLEST SERPL-MCNC: 198 MG/DL (ref 0–200)
CO2 SERPL-SCNC: 25.7 MMOL/L (ref 22–29)
CREAT SERPL-MCNC: 0.69 MG/DL (ref 0.57–1)
EGFRCR SERPLBLD CKD-EPI 2021: 98.3 ML/MIN/1.73
GLOBULIN UR ELPH-MCNC: 2.7 GM/DL
GLUCOSE SERPL-MCNC: 91 MG/DL (ref 65–99)
HDLC SERPL-MCNC: 43 MG/DL (ref 40–60)
LDLC SERPL CALC-MCNC: 125 MG/DL (ref 0–100)
LDLC/HDLC SERPL: 2.81 {RATIO}
POTASSIUM SERPL-SCNC: 4.3 MMOL/L (ref 3.5–5.2)
PROT SERPL-MCNC: 7.5 G/DL (ref 6–8.5)
SODIUM SERPL-SCNC: 139 MMOL/L (ref 136–145)
TRIGL SERPL-MCNC: 171 MG/DL (ref 0–150)
VLDLC SERPL-MCNC: 30 MG/DL (ref 5–40)

## 2023-08-07 PROCEDURE — 80061 LIPID PANEL: CPT

## 2023-08-07 PROCEDURE — 36415 COLL VENOUS BLD VENIPUNCTURE: CPT

## 2023-08-07 PROCEDURE — 80053 COMPREHEN METABOLIC PANEL: CPT

## 2023-08-30 ENCOUNTER — OFFICE VISIT (OUTPATIENT)
Dept: SURGERY | Facility: CLINIC | Age: 63
End: 2023-08-30
Payer: MEDICARE

## 2023-08-30 ENCOUNTER — PREP FOR SURGERY (OUTPATIENT)
Dept: OTHER | Facility: HOSPITAL | Age: 63
End: 2023-08-30
Payer: MEDICARE

## 2023-08-30 VITALS — WEIGHT: 128 LBS | BODY MASS INDEX: 21.85 KG/M2 | HEART RATE: 64 BPM | HEIGHT: 64 IN

## 2023-08-30 DIAGNOSIS — R13.12 OROPHARYNGEAL DYSPHAGIA: ICD-10-CM

## 2023-08-30 DIAGNOSIS — Z12.11 SCREENING FOR MALIGNANT NEOPLASM OF COLON: ICD-10-CM

## 2023-08-30 DIAGNOSIS — K21.00 GASTROESOPHAGEAL REFLUX DISEASE WITH ESOPHAGITIS WITHOUT HEMORRHAGE: Primary | ICD-10-CM

## 2023-08-30 DIAGNOSIS — K31.A0 INTESTINAL METAPLASIA OF GASTRIC MUCOSA: ICD-10-CM

## 2023-08-30 DIAGNOSIS — K21.9 GASTROESOPHAGEAL REFLUX DISEASE WITHOUT ESOPHAGITIS: Primary | ICD-10-CM

## 2023-08-30 DIAGNOSIS — Z86.010 HISTORY OF COLONIC POLYPS: ICD-10-CM

## 2023-08-30 PROBLEM — Z86.0100 HISTORY OF COLONIC POLYPS: Status: ACTIVE | Noted: 2023-08-30

## 2023-08-30 PROCEDURE — 1160F RVW MEDS BY RX/DR IN RCRD: CPT | Performed by: NURSE PRACTITIONER

## 2023-08-30 PROCEDURE — 1159F MED LIST DOCD IN RCRD: CPT | Performed by: NURSE PRACTITIONER

## 2023-08-30 PROCEDURE — 99203 OFFICE O/P NEW LOW 30 MIN: CPT | Performed by: NURSE PRACTITIONER

## 2023-08-30 RX ORDER — PEG-3350, SODIUM SULFATE, SODIUM CHLORIDE, POTASSIUM CHLORIDE, SODIUM ASCORBATE AND ASCORBIC ACID 7.5-2.691G
1000 KIT ORAL ONCE
Qty: 1000 ML | Refills: 0 | Status: SHIPPED | OUTPATIENT
Start: 2023-08-30 | End: 2023-08-30

## 2023-08-30 RX ORDER — SODIUM CHLORIDE 9 MG/ML
40 INJECTION, SOLUTION INTRAVENOUS AS NEEDED
OUTPATIENT
Start: 2023-08-30

## 2023-08-30 RX ORDER — SIMETHICONE 80 MG
TABLET,CHEWABLE ORAL
Qty: 4 TABLET | Refills: 0 | Status: SHIPPED | OUTPATIENT
Start: 2023-08-30

## 2023-08-30 RX ORDER — SODIUM CHLORIDE 0.9 % (FLUSH) 0.9 %
10 SYRINGE (ML) INJECTION AS NEEDED
OUTPATIENT
Start: 2023-08-30

## 2023-08-30 RX ORDER — SODIUM CHLORIDE 0.9 % (FLUSH) 0.9 %
3 SYRINGE (ML) INJECTION EVERY 12 HOURS SCHEDULED
OUTPATIENT
Start: 2023-08-30

## 2023-09-29 PROCEDURE — 87081 CULTURE SCREEN ONLY: CPT | Performed by: FAMILY MEDICINE

## 2023-10-01 ENCOUNTER — TELEPHONE (OUTPATIENT)
Dept: URGENT CARE | Facility: CLINIC | Age: 63
End: 2023-10-01
Payer: MEDICARE

## 2023-10-01 NOTE — TELEPHONE ENCOUNTER
----- Message from John Calvin Cooksey, PA-C sent at 10/1/2023 12:12 PM EDT -----  Please call patient regarding their negative beta hemolytic strep throat culture. This means that they do not have strep throat. They should follow-up with a primary care provider as needed, or if their symptoms worsen, or do not improve.     Thank you,     -John Cooksey, PA-C

## 2023-10-24 ENCOUNTER — OFFICE VISIT (OUTPATIENT)
Dept: FAMILY MEDICINE CLINIC | Facility: CLINIC | Age: 63
End: 2023-10-24
Payer: MEDICARE

## 2023-10-24 VITALS
HEART RATE: 59 BPM | SYSTOLIC BLOOD PRESSURE: 110 MMHG | HEIGHT: 64 IN | DIASTOLIC BLOOD PRESSURE: 70 MMHG | BODY MASS INDEX: 21.34 KG/M2 | TEMPERATURE: 97.8 F | WEIGHT: 125 LBS | OXYGEN SATURATION: 94 %

## 2023-10-24 DIAGNOSIS — R51.9 NONINTRACTABLE HEADACHE, UNSPECIFIED CHRONICITY PATTERN, UNSPECIFIED HEADACHE TYPE: Primary | ICD-10-CM

## 2023-10-24 DIAGNOSIS — R22.1 MASS OF LEFT SIDE OF NECK: ICD-10-CM

## 2023-10-24 DIAGNOSIS — R53.83 OTHER FATIGUE: ICD-10-CM

## 2023-10-24 DIAGNOSIS — G43.111 INTRACTABLE MIGRAINE WITH AURA WITH STATUS MIGRAINOSUS: ICD-10-CM

## 2023-10-24 LAB
ALBUMIN SERPL-MCNC: 4.6 G/DL (ref 3.5–5.2)
ALBUMIN/GLOB SERPL: 1.6 G/DL
ALP SERPL-CCNC: 45 U/L (ref 39–117)
ALT SERPL W P-5'-P-CCNC: 21 U/L (ref 1–33)
ANION GAP SERPL CALCULATED.3IONS-SCNC: 9.3 MMOL/L (ref 5–15)
AST SERPL-CCNC: 26 U/L (ref 1–32)
BILIRUB BLD-MCNC: NEGATIVE MG/DL
BILIRUB SERPL-MCNC: 0.4 MG/DL (ref 0–1.2)
BUN SERPL-MCNC: 9 MG/DL (ref 8–23)
BUN/CREAT SERPL: 14.5 (ref 7–25)
CALCIUM SPEC-SCNC: 9.7 MG/DL (ref 8.6–10.5)
CHLORIDE SERPL-SCNC: 103 MMOL/L (ref 98–107)
CLARITY, POC: CLEAR
CO2 SERPL-SCNC: 25.7 MMOL/L (ref 22–29)
COLOR UR: YELLOW
CREAT SERPL-MCNC: 0.62 MG/DL (ref 0.57–1)
EGFRCR SERPLBLD CKD-EPI 2021: 100.2 ML/MIN/1.73
EXPIRATION DATE: NORMAL
FLUAV AG UPPER RESP QL IA.RAPID: NOT DETECTED
FLUBV AG UPPER RESP QL IA.RAPID: NOT DETECTED
GLOBULIN UR ELPH-MCNC: 2.8 GM/DL
GLUCOSE SERPL-MCNC: 93 MG/DL (ref 65–99)
GLUCOSE UR STRIP-MCNC: NEGATIVE MG/DL
INTERNAL CONTROL: NORMAL
KETONES UR QL: NEGATIVE
LEUKOCYTE EST, POC: NEGATIVE
Lab: NORMAL
NITRITE UR-MCNC: NEGATIVE MG/ML
PH UR: 6 [PH] (ref 5–8)
POTASSIUM SERPL-SCNC: 4.1 MMOL/L (ref 3.5–5.2)
PROT SERPL-MCNC: 7.4 G/DL (ref 6–8.5)
PROT UR STRIP-MCNC: NEGATIVE MG/DL
RBC # UR STRIP: NEGATIVE /UL
SARS-COV-2 AG UPPER RESP QL IA.RAPID: NOT DETECTED
SODIUM SERPL-SCNC: 138 MMOL/L (ref 136–145)
SP GR UR: 1.03 (ref 1–1.03)
UROBILINOGEN UR QL: NORMAL

## 2023-10-24 PROCEDURE — 80053 COMPREHEN METABOLIC PANEL: CPT | Performed by: FAMILY MEDICINE

## 2023-10-24 RX ORDER — LIDOCAINE 50 MG/G
1 PATCH TOPICAL EVERY 24 HOURS
COMMUNITY
Start: 2023-04-17 | End: 2024-04-16

## 2023-10-24 RX ORDER — ALENDRONATE SODIUM 70 MG/1
1 TABLET ORAL
COMMUNITY
Start: 2023-05-01 | End: 2023-10-30

## 2023-10-24 RX ORDER — FOLIC ACID 1 MG/1
1 TABLET ORAL DAILY
COMMUNITY
Start: 2023-07-24 | End: 2024-07-22

## 2023-10-24 NOTE — PROGRESS NOTES
Chief Complaint  Chief Complaint   Patient presents with    Headache    Fatigue       HPI:  Panda Rinaldi presents to [facility]    Panda Rinaldi is a 63-year-old female who presents today for an office visit. An adult male accompanies the patient today.    She has been having a mild headache for the past 5 days. The adult male says it has caused her to be distracted. She has a history of having migraines. They do not typically last several days. She says it feels like someone is hitting her on the posterior left side of her head. It has been a long time since she had diagnostic imaging completed. She said her headaches improved after menopause at age 50. She does not have elevated blood pressure. She has been taking Excedrin without any relief. She has some mild nausea but denies having vision changes. She does note dizziness and lightheadedness. She says noises worsen her headache. She has been feeling more tired than usual. She admits to ingesting more Ginseng root lately in the past 2 months. She denies sinus drainage.    She is scheduled to have her second injection of Prolia on 12/2023.    Procedures     Past History:  Medical History: has a past medical history of Abnormal bone density screening (10/15/2014), Acid reflux, Allergic (10year), Arthritis (07/15/2021), Colon polyp (2021), Coronary artery disease (2018), Fatty liver (2022), Headache (06/01/1993), Heart disease, Hyperlipemia, Osteopenia, Osteoporosis (10/15/2014), Palpitations, Seasonal allergies, and Shoulder pain.   Surgical History: has a past surgical history that includes Colonoscopy (10/2015); Shoulder surgery (Right, 2014); Esophagogastroduodenoscopy (N/A, 07/15/2021); Colonoscopy (N/A, 07/15/2021); and Upper gastrointestinal endoscopy.   Family History: family history includes Arthritis in her mother; Breast cancer in her sister and another family member; Colon cancer (age of onset: 65) in her mother; Heart attack  in her mother; Heart disease in her father; Stroke in an other family member.   Social History: reports that she quit smoking about 37 years ago. Her smoking use included cigarettes. She started smoking about 43 years ago. She has never been exposed to tobacco smoke. She has never used smokeless tobacco. She reports current alcohol use. She reports that she does not use drugs.  Immunization History   Administered Date(s) Administered    COVID-19 (PFIZER) Purple Cap Monovalent 03/16/2021, 04/06/2021, 12/21/2021    Fluzone (or Fluarix & Flulaval for VFC) >6mos 10/13/2022    Fluzone Quad >6mos (Multi-dose) 10/01/2020    Influenza, Unspecified 10/01/2020    Shingrix 05/25/2021, 09/08/2021         Allergies: Hydrocodone-acetaminophen, Tramadol, Codeine, Azithromycin, and Latex     Medications:  Current Outpatient Medications on File Prior to Visit   Medication Sig Dispense Refill    Acetaminophen 325 MG capsule       Carboxymethylcell-Glycerin PF 0.5-1 % solution Apply 0.5 % to eye(s) as directed by provider.      cetirizine (zyrTEC) 10 MG tablet Take 1 tablet by mouth Daily. 90 tablet 1    cyclobenzaprine (FLEXERIL) 10 MG tablet       Diclofenac Sodium (VOLTAREN) 1 % gel gel Apply 4 g topically to the appropriate area as directed 2 (Two) Times a Day. 2 g 2    ezetimibe (Zetia) 10 MG tablet Take 1 tablet by mouth Daily. 90 tablet 1    folic acid (FOLVITE) 1 MG tablet Take 1 tablet by mouth Daily.      ibuprofen (ADVIL,MOTRIN) 800 MG tablet Take 1 tablet by mouth Every 6 (Six) Hours As Needed for Mild Pain . 90 tablet 0    lidocaine (LIDODERM) 5 % Place 1 patch on the skin as directed by provider Daily.      metoprolol succinate XL (TOPROL-XL) 25 MG 24 hr tablet Take 1 tablet by mouth Daily. 90 tablet 1    [DISCONTINUED] alendronate (FOSAMAX) 70 MG tablet Take 1 tablet by mouth Every 7 (Seven) Days.      [DISCONTINUED] simethicone (Gas-X) 80 MG chewable tablet Take 2 tablets PO after completing movi prep and 2 tablets PO  "4 hours prior to procedure. 4 tablet 0     No current facility-administered medications on file prior to visit.        Health Maintenance Due   Topic Date Due    Pneumococcal Vaccine 0-64 (1 - PCV) Never done    TDAP/TD VACCINES (1 - Tdap) Never done    COLORECTAL CANCER SCREENING  07/15/2023    INFLUENZA VACCINE  08/01/2023    COVID-19 Vaccine (4 - 2023-24 season) 09/01/2023    ANNUAL WELLNESS VISIT  10/13/2023       Vital Signs:   Vitals:    10/24/23 1137   BP: 110/70   Pulse: 59   Temp: 97.8 °F (36.6 °C)   SpO2: 94%   Weight: 56.7 kg (125 lb)   Height: 162.6 cm (64\")      Body mass index is 21.46 kg/m².     ROS:  Review of Systems   Constitutional:  Positive for fatigue. Negative for fever.   HENT:  Negative for congestion, ear pain and sinus pressure.    Respiratory:  Negative for cough, chest tightness and shortness of breath.    Cardiovascular:  Negative for chest pain, palpitations and leg swelling.   Gastrointestinal:  Positive for nausea. Negative for abdominal pain and diarrhea.   Genitourinary:  Negative for dysuria and frequency.   Neurological:  Positive for dizziness and headache. Negative for speech difficulty and confusion.   Psychiatric/Behavioral:  Negative for agitation and behavioral problems.         Headaches  Mild nausea  Dizziness  Tiredness     Physical Exam  Vitals reviewed.   Constitutional:       Appearance: Normal appearance.   HENT:      Right Ear: Tympanic membrane normal.      Left Ear: Tympanic membrane normal.      Nose: Nose normal.   Eyes:      Extraocular Movements: Extraocular movements intact.      Conjunctiva/sclera: Conjunctivae normal.      Pupils: Pupils are equal, round, and reactive to light.   Cardiovascular:      Rate and Rhythm: Normal rate and regular rhythm.   Pulmonary:      Effort: Pulmonary effort is normal.      Breath sounds: Normal breath sounds.   Abdominal:      General: Bowel sounds are normal.   Musculoskeletal:         General: Normal range of motion.      " Cervical back: Normal range of motion.   Skin:     General: Skin is warm and dry.   Neurological:      General: No focal deficit present.      Mental Status: She is alert and oriented to person, place, and time.   Psychiatric:         Mood and Affect: Mood normal.         Behavior: Behavior normal.          Result Review        Diagnoses and all orders for this visit:    1. Nonintractable headache, unspecified chronicity pattern, unspecified headache type (Primary)  -     POCT SARS-CoV-2 Antigen REBECCA + Flu  -     POCT urinalysis dipstick, manual    2. Other fatigue  -     POCT SARS-CoV-2 Antigen REBECCA + Flu    3. Intractable migraine with aura with status migrainosus  -     Cancel: MRI Brain Without Contrast; Future  -     Comprehensive Metabolic Panel  -     CT Head With Contrast; Future  -     Rimegepant Sulfate (Nurtec) 75 MG tablet dispersible tablet; Take 1 tablet by mouth 1 (One) Time As Needed (headache) for up to 1 dose.  Dispense: 15 tablet; Refill: 2    4. Mass of left side of neck  -     CT Soft Tissue Neck With Contrast; Future          Smoking Cessation:    Panda Rinaldi  reports that she quit smoking about 37 years ago. Her smoking use included cigarettes. She started smoking about 43 years ago. She has never been exposed to tobacco smoke. She has never used smokeless tobacco.    Migraines  - I am going to order a CT and laboratory studies.       Follow Up   Return in about 4 weeks (around 11/21/2023).  Patient was given instructions and counseling regarding her condition or for health maintenance advice. Please see specific information pulled into the AVS if appropriate.       Lorri White MD  Answers submitted by the patient for this visit:  Other (Submitted on 10/23/2023)  Please describe your symptoms.: Mild headache  Have you had these symptoms before?: Yes  How long have you been having these symptoms?: 1-4 days  Please list any medications you are currently taking for this condition.:  Johnnie  Primary Reason for Visit (Submitted on 10/23/2023)  What is the primary reason for your visit?: Other    Transcribed from ambient dictation for Lorri White MD by Perfecto Murray.  10/24/23   14:23 EDT    Patient or patient representative verbalized consent to the visit recording.  I have personally performed the services described in this document as transcribed by the above individual, and it is both accurate and complete.     Gong: I have seen and examined the patient face to face, have reviewed and addended the HPI, PE and a/p as necessary.     38 yo M with no PMH a/w chest pain on the L side radiating down L arm x 5-6 days.  Reports having some abdominal pain radiating to chest x 2-3 days.  Noted to have some improvement with ibuprofen.  Denies fevers, sob, PEREZ, nausea, vomiting, urinary symptoms, pain with movement, pain with eating.      GEN - obese, NAD; well appearing; A+O x3; non-toxic appearing  CARD -s1s2, RRR, no M,G,R;   PULM - CTA b/l, symmetric breath sounds;   ABD -  +BS, ND, NT, soft, no guarding, no rebound, no masses;   BACK - no CVA tenderness, Normal  spine;   EXT - symmetric pulses, 2+ dp, capillary refill < 2 seconds, no cyanosis, no edema;   NEURO - no focal neuro deficits, no slurred speech    EKG non-ischemic    38 yo M with no PMH a/w chest pain on the L side radiating down L arm x 5-6 days.  Concern for possible ACS, vs GERD, vs gastritis, check cbc, cmp, trop, EKG, chest xray and re-eval.  Possible cdu for stress and echo.

## 2023-10-26 ENCOUNTER — OFFICE VISIT (OUTPATIENT)
Dept: ORTHOPEDIC SURGERY | Facility: CLINIC | Age: 63
End: 2023-10-26
Payer: MEDICARE

## 2023-10-26 VITALS — HEIGHT: 64 IN | BODY MASS INDEX: 21.34 KG/M2 | WEIGHT: 125 LBS

## 2023-10-26 DIAGNOSIS — M25.531 RIGHT WRIST PAIN: Primary | ICD-10-CM

## 2023-10-26 DIAGNOSIS — G56.01 CARPAL TUNNEL SYNDROME ON RIGHT: ICD-10-CM

## 2023-10-26 NOTE — PROGRESS NOTES
"Chief Complaint  Initial Evaluation of the Right Wrist     Subjective      Panda Rinaldi presents to [facility] for initial evaluation of the right wrist. She is having pain in the right wrist.  She has had pain and some tingling in the right wrist.  She has been wearing a brace.  She has had decrease ROM  and  strength.  She notes sometimes there is a shock that goes up to her elbow.     Allergies   Allergen Reactions    Hydrocodone-Acetaminophen Shortness Of Breath    Tramadol Shortness Of Breath    Codeine Unknown - High Severity    Azithromycin Unknown - Low Severity    Latex Unknown - Low Severity        Social History     Socioeconomic History    Marital status:    Tobacco Use    Smoking status: Former     Packs/day: 0.00     Years: 5.00     Additional pack years: 0.00     Total pack years: 0.00     Types: Cigarettes     Start date: 1980     Quit date: 1985     Years since quittin.8     Passive exposure: Never    Smokeless tobacco: Never   Vaping Use    Vaping Use: Never used   Substance and Sexual Activity    Alcohol use: Yes     Comment: sometimes we have wine on holidays, usually 1-2 glasses    Drug use: Never    Sexual activity: Not Currently     Partners: Male     Comment: Not sexually active in years.        I reviewed the patient's chief complaint, history of present illness, review of systems, past medical history, surgical history, family history, social history, medications, and allergy list.     Review of Systems     Constitutional: Denies fevers, chills, weight loss  Cardiovascular: Denies chest pain, shortness of breath  Skin: Denies rashes, acute skin changes  Neurologic: Denies headache, loss of consciousness        Vital Signs:   Ht 162.6 cm (64\")   Wt 56.7 kg (125 lb)   BMI 21.46 kg/m²          Physical Exam  General: Alert. No acute distress    Ortho Exam        RIGHT WRIST Negative Compression testing/ Negative Tinels. " NegativeFinkelsteins. Negative Logan's testing. Negative [facility] grind testing. Negative Phalens. Mildly Full ROM of the hand, fingers, elbow and wrist. Negative Triggering of the digit. Sensation grossly intact to light touch, median, radial and ulnar nerve. Positive AIN, PIN and ulnar nerve motor function intact. Axillary nerve intact. Positive pulses.  Mild swelling.       Procedures      Imaging Results (Most Recent)       None             Result Review :             Assessment and Plan     Diagnoses and all orders for this visit:    1. Right wrist pain (Primary)    2. Carpal tunnel syndrome on right        Discussed the treatment plan with the patient.     Discussed the risks and benefits of conservative measures.  The patient expressed understanding and wished to proceed with a right IM injection.  She tolerated the injection well.      Continue brace.     Call or return if worsening symptoms.    Follow Up     4-6 weeks to assess ROM and pain.       Patient was given instructions and counseling regarding her condition or for health maintenance advice. Please see specific information pulled into the AVS if appropriate.     Scribed for Kota Rcihey MD by Lesvia Bourne MA.  10/26/23   15:41 EDT    I have personally performed the services described in this document as scribed by the above individual and it is both accurate and complete. Kota Ricehy MD 10/26/23

## 2023-10-30 RX ORDER — RIMEGEPANT SULFATE 75 MG/75MG
1 TABLET, ORALLY DISINTEGRATING ORAL ONCE AS NEEDED
Qty: 15 TABLET | Refills: 2 | Status: SHIPPED | OUTPATIENT
Start: 2023-10-30

## 2023-11-14 ENCOUNTER — OFFICE VISIT (OUTPATIENT)
Dept: ORTHOPEDIC SURGERY | Facility: CLINIC | Age: 63
End: 2023-11-14
Payer: MEDICARE

## 2023-11-14 VITALS
HEART RATE: 68 BPM | DIASTOLIC BLOOD PRESSURE: 68 MMHG | BODY MASS INDEX: 21.34 KG/M2 | SYSTOLIC BLOOD PRESSURE: 100 MMHG | WEIGHT: 125 LBS | HEIGHT: 64 IN

## 2023-11-14 DIAGNOSIS — S63.501D SPRAIN OF RIGHT WRIST, SUBSEQUENT ENCOUNTER: Primary | ICD-10-CM

## 2023-11-14 DIAGNOSIS — M25.531 RIGHT WRIST PAIN: ICD-10-CM

## 2023-11-14 NOTE — PROGRESS NOTES
"Chief Complaint  Follow-up of the Right Wrist    Subjective      Panda Rinaldi presents to [facility] for follow up of her right wrist.  She was last seen and evaluated in office on 10/26/2023 and diagnosed with right wrist pain and carpal tunnel syndrome on the right.  She received a right IM injection during that office visit.  She was recommended to continue her wrist brace.  Today, she is not wearing her wrist brace. She denies numbness/tingling in the hand. She said this pain is temporary and she \"pulled a muscle\". She feels like it is getting better than the first day. She said the steroids did help her pain and helped her movement. She said the brace she was given is too big so she isn't able to wear it. She said it is very sensitive to touch.     Allergies   Allergen Reactions    Hydrocodone-Acetaminophen Shortness Of Breath    Tramadol Shortness Of Breath    Codeine Unknown - High Severity    Azithromycin Unknown - Low Severity    Kenalog [Triamcinolone Acetonide] Rash    Latex Unknown - Low Severity       Objective     Vital Signs:   Vitals:    11/14/23 1350   BP: 100/68   Pulse: 68   Weight: 56.7 kg (125 lb)   Height: 162.6 cm (64\")   PainSc:   4     Body mass index is 21.46 kg/m².    I reviewed the patient's chief complaint, history of present illness, review of systems, past medical history, surgical history, family history, social history, medications, and allergy list.     REVIEW OF SYSTEMS    Constitutional: Denies fevers, chills, weight loss  Cardiovascular: Denies chest pain, shortness of breath  Skin: Denies rashes, acute skin changes  Neurologic: Denies headache, loss of consciousness  MSK: Right wrist pain.     Ortho Exam  Right Wrist: Skin is warm, dry, and intact.  Pain reported with flexion and extension of the wrist.  Negative Finkelstein's.  Negative compression testing.  Negative CMC grind testing.  She is able to form a full fist.  Good thumb opposition.  " Sensation and distal neurovascular intact.          Imaging Results (Most Recent)       None                Assessment and Plan   Diagnoses and all orders for this visit:    1. Sprain of right wrist, subsequent encounter (Primary)    2. Right wrist pain         Panda is here following up on her right wrist pain/injury. She doesn't have classic symptoms that reflect carpal tunnel, more of a right wrist sprain. She has had improvement with previous prescribed steroids. She is doing home exercises at home and recommended to continue those. Recommended taking NSAIDs as needed for pain as well as ice/elevation. Smaller wrist brace was given to her today per her request however, it was advised to wean out of the brace as tolerated.     Recommended following up as needed if pain/swelling/numbness/tingling returns or worsens.       Tobacco Use: Medium Risk (11/14/2023)    Patient History     Smoking Tobacco Use: Former     Smokeless Tobacco Use: Never     Passive Exposure: Never     Patient reports that they are a nonsmoker; cessation education not applicable.     BMI is within normal parameters. No other follow-up for BMI required.    Follow Up   No follow-ups on file.  There are no Patient Instructions on file for this visit.  Patient was given instructions and counseling regarding her condition or for health maintenance advice. Please see specific information pulled into the AVS if appropriate.

## 2023-11-20 ENCOUNTER — HOSPITAL ENCOUNTER (OUTPATIENT)
Dept: CT IMAGING | Facility: HOSPITAL | Age: 63
Discharge: HOME | End: 2023-11-20
Admitting: FAMILY MEDICINE
Payer: MEDICARE

## 2023-11-20 DIAGNOSIS — G43.111 INTRACTABLE MIGRAINE WITH AURA WITH STATUS MIGRAINOSUS: ICD-10-CM

## 2023-11-20 DIAGNOSIS — R22.1 MASS OF LEFT SIDE OF NECK: ICD-10-CM

## 2023-11-20 LAB
CREAT BLDA-MCNC: 0.6 MG/DL
EGFRCR SERPLBLD CKD-EPI 2021: 101 ML/MIN/1.73

## 2023-11-20 PROCEDURE — 82565 ASSAY OF CREATININE: CPT

## 2023-11-20 PROCEDURE — 25510000001 IOPAMIDOL PER 1 ML: Performed by: FAMILY MEDICINE

## 2023-11-20 PROCEDURE — 70470 CT HEAD/BRAIN W/O & W/DYE: CPT

## 2023-11-20 PROCEDURE — 70491 CT SOFT TISSUE NECK W/DYE: CPT

## 2023-11-20 RX ADMIN — IOPAMIDOL 85 ML: 755 INJECTION, SOLUTION INTRAVENOUS at 15:01

## 2023-11-21 NOTE — PROGRESS NOTES
Please call and inform patient that her Head CT did not show any evidence of a mass or lesion on the brain and did show some inflammation in her left sinus area.

## 2023-11-27 ENCOUNTER — OFFICE VISIT (OUTPATIENT)
Dept: FAMILY MEDICINE CLINIC | Facility: CLINIC | Age: 63
End: 2023-11-27
Payer: MEDICARE

## 2023-11-27 VITALS
TEMPERATURE: 98 F | DIASTOLIC BLOOD PRESSURE: 70 MMHG | SYSTOLIC BLOOD PRESSURE: 120 MMHG | HEART RATE: 68 BPM | HEIGHT: 64 IN | WEIGHT: 127 LBS | BODY MASS INDEX: 21.68 KG/M2 | OXYGEN SATURATION: 100 %

## 2023-11-27 DIAGNOSIS — J38.7 VALLECULAR MASS: ICD-10-CM

## 2023-11-27 DIAGNOSIS — R13.12 OROPHARYNGEAL DYSPHAGIA: Primary | ICD-10-CM

## 2023-11-27 PROCEDURE — 99214 OFFICE O/P EST MOD 30 MIN: CPT | Performed by: FAMILY MEDICINE

## 2023-11-27 RX ORDER — VIT C/B6/B5/MAGNESIUM/HERB 173 50-5-6-5MG
500 CAPSULE ORAL DAILY
COMMUNITY

## 2023-11-27 NOTE — PROGRESS NOTES
Chief Complaint  Chief Complaint   Patient presents with    Needs 2nd Prolia shot     Due in December    Dull ache on left side of head still    Medication question     Question regarding Niacin       HPI:  Panda Rinaldi presents to [facility]  Panda Rinaldi is a 63-year-old female who presents for evaluation of multiple medical concerns. She is accompanied by her .    She underwent a CT Head with & without contrast and a CT soft tissue neck with contrast on 11/20/2023. The head CT revealed no masses, lesions, hemorrhages, or enhancements of any concern. She has some sinus thickening. Neck CT showed secretions or ingested material on his posterior pharynx. There were no lesions in her throat. She sometimes feels like food gets stuck in her throat when she swallows. She is scheduled for a colonoscopy and endoscopy on 12/2023.    Her headaches have been worse than the last time.  She takes Nurtec as needed, which does help. She is due for her Prolia injection.     She is inquiring about niacin. Her last set of laboratory studies was completed on 10/24/2023. Kidney function was normal. Her sodium and potassium levels were normal. Her ALT and AST were normal. Her GFR was great.    He has 3 grandkids.     Procedures     Past History:  Medical History: has a past medical history of Abnormal bone density screening (10/15/2014), Acid reflux, Allergic (10year), Arthritis (07/15/2021), Colon polyp (2021), Coronary artery disease (2018), Fatty liver (2022), Headache (06/01/1993), Heart disease, Hyperlipemia, Osteopenia, Osteoporosis (10/15/2014), Palpitations, Seasonal allergies, and Shoulder pain.   Surgical History: has a past surgical history that includes Colonoscopy (10/2015); Shoulder surgery (Right, 2014); Esophagogastroduodenoscopy (N/A, 07/15/2021); Colonoscopy (N/A, 07/15/2021); and Upper gastrointestinal endoscopy.   Family History: family history includes Arthritis in her mother;  Breast cancer in her sister and another family member; Colon cancer (age of onset: 65) in her mother; Heart attack in her mother; Heart disease in her father; Stroke in an other family member.   Social History: reports that she quit smoking about 37 years ago. Her smoking use included cigarettes. She started smoking about 43 years ago. She has never been exposed to tobacco smoke. She has never used smokeless tobacco. She reports current alcohol use. She reports that she does not use drugs.  Immunization History   Administered Date(s) Administered    COVID-19 (PFIZER) Purple Cap Monovalent 03/16/2021, 04/06/2021, 12/21/2021    Fluzone (or Fluarix & Flulaval for VFC) >6mos 10/13/2022    Fluzone Quad >6mos (Multi-dose) 10/01/2020    Influenza, Unspecified 10/01/2020    Shingrix 05/25/2021, 09/08/2021         Allergies: Hydrocodone-acetaminophen, Tramadol, Codeine, Azithromycin, Kenalog [triamcinolone acetonide], and Latex     Medications:  Current Outpatient Medications on File Prior to Visit   Medication Sig Dispense Refill    Acetaminophen 325 MG capsule       Carboxymethylcell-Glycerin PF 0.5-1 % solution Apply 0.5 % to eye(s) as directed by provider.      cetirizine (zyrTEC) 10 MG tablet Take 1 tablet by mouth Daily. 90 tablet 1    cyclobenzaprine (FLEXERIL) 10 MG tablet       Diclofenac Sodium (VOLTAREN) 1 % gel gel Apply 4 g topically to the appropriate area as directed 2 (Two) Times a Day. 2 g 2    ezetimibe (Zetia) 10 MG tablet Take 1 tablet by mouth Daily. 90 tablet 1    folic acid (FOLVITE) 1 MG tablet Take 1 tablet by mouth Daily.      ibuprofen (ADVIL,MOTRIN) 800 MG tablet Take 1 tablet by mouth Every 6 (Six) Hours As Needed for Mild Pain . 90 tablet 0    Inositol Niacinate (NIACIN FLUSH FREE PO) Take  by mouth.      lidocaine (LIDODERM) 5 % Place 1 patch on the skin as directed by provider Daily.      metoprolol succinate XL (TOPROL-XL) 25 MG 24 hr tablet Take 1 tablet by mouth Daily. 90 tablet 1     "Turmeric (QC Tumeric Complex) 500 MG capsule Take 1 capsule by mouth Daily.      Rimegepant Sulfate (Nurtec) 75 MG tablet dispersible tablet Take 1 tablet by mouth 1 (One) Time As Needed (headache) for up to 1 dose. (Patient not taking: Reported on 11/27/2023) 15 tablet 2     No current facility-administered medications on file prior to visit.        Health Maintenance Due   Topic Date Due    Pneumococcal Vaccine 0-64 (1 - PCV) Never done    TDAP/TD VACCINES (1 - Tdap) Never done    COLORECTAL CANCER SCREENING  07/15/2023    INFLUENZA VACCINE  08/01/2023    COVID-19 Vaccine (4 - 2023-24 season) 09/01/2023    ANNUAL WELLNESS VISIT  10/13/2023       Vital Signs:   Vitals:    11/27/23 1051   BP: 120/70   Pulse: 68   Temp: 98 °F (36.7 °C)   SpO2: 100%   Weight: 57.6 kg (127 lb)   Height: 162.6 cm (64\")      Body mass index is 21.8 kg/m².     ROS:  Review of Systems   Constitutional:  Negative for fatigue and fever.   HENT:  Negative for congestion, ear pain and sinus pressure.         Headaches.   Respiratory:  Negative for cough, chest tightness and shortness of breath.    Cardiovascular:  Negative for chest pain, palpitations and leg swelling.   Gastrointestinal:  Negative for abdominal pain and diarrhea.        Food stuck in throat with difficulty swallowing.   Genitourinary:  Negative for dysuria and frequency.   Neurological:  Negative for speech difficulty, headache and confusion.   Psychiatric/Behavioral:  Negative for agitation and behavioral problems.           Physical Exam  Vitals reviewed.   Constitutional:       Appearance: Normal appearance.   HENT:      Right Ear: Tympanic membrane normal.      Left Ear: Tympanic membrane normal.      Nose: Nose normal.   Eyes:      Extraocular Movements: Extraocular movements intact.      Conjunctiva/sclera: Conjunctivae normal.      Pupils: Pupils are equal, round, and reactive to light.   Cardiovascular:      Rate and Rhythm: Normal rate and regular rhythm. "   Pulmonary:      Effort: Pulmonary effort is normal.      Breath sounds: Normal breath sounds.   Abdominal:      General: Bowel sounds are normal.   Musculoskeletal:         General: Normal range of motion.      Cervical back: Normal range of motion.   Skin:     General: Skin is warm and dry.   Neurological:      General: No focal deficit present.      Mental Status: She is alert and oriented to person, place, and time.   Psychiatric:         Mood and Affect: Mood normal.         Behavior: Behavior normal.          Result Review   Imaging studies reviewed from 11/20/2023.  The head CT revealed no masses, lesions, hemorrhages, or enhancements of any concern. She has some sinus thickening. Neck CT showed secretions or ingested material on his posterior pharynx. There were no lesions in her throat.    Laboratory studies reviewed from 10/24/2023.  Kidney function was normal. Her sodium and potassium levels were normal. Her ALT and AST were normal. Her GFR was great.         Diagnoses and all orders for this visit:    1. Oropharyngeal dysphagia (Primary)  -     Ambulatory Referral to ENT (Otolaryngology)    2. Vallecular mass  -     Ambulatory Referral to ENT (Otolaryngology)        1. Dysphagia.  - She is scheduled to see General surgery for EGD on 12/21/2023.  ENT referral will be placed.    2. Migraines.  - We will continue to monitor her headaches.    BMI is within normal parameters. No other follow-up for BMI required.       Smoking Cessation:    Panda Rinaldi  reports that she quit smoking about 37 years ago. Her smoking use included cigarettes. She started smoking about 43 years ago. She has never been exposed to tobacco smoke. She has never used smokeless tobacco.        Follow Up   Return in about 2 months (around 1/27/2024).  Patient was given instructions and counseling regarding her condition or for health maintenance advice. Please see specific information pulled into the AVS if appropriate.       Transcribed  from ambient dictation for Lorri White MD by Faith Velasquez.   11/27/23   12:19 EST    Patient or patient representative verbalized consent to the visit recording.  I have personally performed the services described in this document as transcribed by the above individual, and it is both accurate and complete.

## 2023-11-27 NOTE — PROGRESS NOTES
Chief Complaint  Chief Complaint   Patient presents with   • Needs 2nd Prolia shot     Due in December   • Dull ache on left side of head still   • Medication question     Question regarding Niacin       HPI:  Panda Rinaldi presents to [facility]      Procedures     Past History:  Medical History: has a past medical history of Abnormal bone density screening (10/15/2014), Acid reflux, Allergic (10year), Arthritis (07/15/2021), Colon polyp (2021), Coronary artery disease (2018), Fatty liver (2022), Headache (06/01/1993), Heart disease, Hyperlipemia, Osteopenia, Osteoporosis (10/15/2014), Palpitations, Seasonal allergies, and Shoulder pain.   Surgical History: has a past surgical history that includes Colonoscopy (10/2015); Shoulder surgery (Right, 2014); Esophagogastroduodenoscopy (N/A, 07/15/2021); Colonoscopy (N/A, 07/15/2021); and Upper gastrointestinal endoscopy.   Family History: family history includes Arthritis in her mother; Breast cancer in her sister and another family member; Colon cancer (age of onset: 65) in her mother; Heart attack in her mother; Heart disease in her father; Stroke in an other family member.   Social History: reports that she quit smoking about 37 years ago. Her smoking use included cigarettes. She started smoking about 43 years ago. She has never been exposed to tobacco smoke. She has never used smokeless tobacco. She reports current alcohol use. She reports that she does not use drugs.  Immunization History   Administered Date(s) Administered   • COVID-19 (PFIZER) Purple Cap Monovalent 03/16/2021, 04/06/2021, 12/21/2021   • Fluzone (or Fluarix & Flulaval for VFC) >6mos 10/13/2022   • Fluzone Quad >6mos (Multi-dose) 10/01/2020   • Influenza, Unspecified 10/01/2020   • Shingrix 05/25/2021, 09/08/2021         Allergies: Hydrocodone-acetaminophen, Tramadol, Codeine, Azithromycin, Kenalog [triamcinolone acetonide], and Latex     Medications:  Current  "Outpatient Medications on File Prior to Visit   Medication Sig Dispense Refill   • Acetaminophen 325 MG capsule      • Carboxymethylcell-Glycerin PF 0.5-1 % solution Apply 0.5 % to eye(s) as directed by provider.     • cetirizine (zyrTEC) 10 MG tablet Take 1 tablet by mouth Daily. 90 tablet 1   • cyclobenzaprine (FLEXERIL) 10 MG tablet      • Diclofenac Sodium (VOLTAREN) 1 % gel gel Apply 4 g topically to the appropriate area as directed 2 (Two) Times a Day. 2 g 2   • ezetimibe (Zetia) 10 MG tablet Take 1 tablet by mouth Daily. 90 tablet 1   • folic acid (FOLVITE) 1 MG tablet Take 1 tablet by mouth Daily.     • ibuprofen (ADVIL,MOTRIN) 800 MG tablet Take 1 tablet by mouth Every 6 (Six) Hours As Needed for Mild Pain . 90 tablet 0   • Inositol Niacinate (NIACIN FLUSH FREE PO) Take  by mouth.     • lidocaine (LIDODERM) 5 % Place 1 patch on the skin as directed by provider Daily.     • metoprolol succinate XL (TOPROL-XL) 25 MG 24 hr tablet Take 1 tablet by mouth Daily. 90 tablet 1   • Turmeric (QC Tumeric Complex) 500 MG capsule Take 1 capsule by mouth Daily.     • Rimegepant Sulfate (Nurtec) 75 MG tablet dispersible tablet Take 1 tablet by mouth 1 (One) Time As Needed (headache) for up to 1 dose. (Patient not taking: Reported on 11/27/2023) 15 tablet 2     No current facility-administered medications on file prior to visit.        Health Maintenance Due   Topic Date Due   • Pneumococcal Vaccine 0-64 (1 - PCV) Never done   • TDAP/TD VACCINES (1 - Tdap) Never done   • COLORECTAL CANCER SCREENING  07/15/2023   • INFLUENZA VACCINE  08/01/2023   • COVID-19 Vaccine (4 - 2023-24 season) 09/01/2023   • ANNUAL WELLNESS VISIT  10/13/2023       Vital Signs:   Vitals:    11/27/23 1051   BP: 120/70   Pulse: 68   Temp: 98 °F (36.7 °C)   SpO2: 100%   Weight: 57.6 kg (127 lb)   Height: 162.6 cm (64\")      Body mass index is 21.8 kg/m².     ROS:  Review of Systems       Physical Exam     Result Review        There are no diagnoses " "linked to this encounter.    BMI is within normal parameters. No other follow-up for BMI required.       Smoking Cessation:    Panda Rinaldi  reports that she quit smoking about 37 years ago. Her smoking use included cigarettes. She started smoking about 43 years ago. She has never been exposed to tobacco smoke. She has never used smokeless tobacco.. I have educated her on the risk of diseases from using tobacco products such as {Tobacco Cessation Diseases:93114::\"cancer\",\"COPD\",\"heart disease\"}.     I advised her to quit and she is {Willing/Not Willing to Quit Tobacco Products:85359}.    I spent {Time Spent Tobacco :99348} minutes counseling the patient.          Follow Up   No follow-ups on file.  Patient was given instructions and counseling regarding her condition or for health maintenance advice. Please see specific information pulled into the AVS if appropriate.       Lorri White MD  "

## 2023-12-01 ENCOUNTER — PATIENT MESSAGE (OUTPATIENT)
Dept: FAMILY MEDICINE CLINIC | Facility: CLINIC | Age: 63
End: 2023-12-01
Payer: MEDICARE

## 2023-12-01 DIAGNOSIS — M81.0 AGE-RELATED OSTEOPOROSIS WITHOUT CURRENT PATHOLOGICAL FRACTURE: Primary | ICD-10-CM

## 2023-12-07 ENCOUNTER — TELEPHONE (OUTPATIENT)
Dept: FAMILY MEDICINE CLINIC | Facility: CLINIC | Age: 63
End: 2023-12-07

## 2023-12-07 DIAGNOSIS — M81.0 OSTEOPOROSIS, UNSPECIFIED OSTEOPOROSIS TYPE, UNSPECIFIED PATHOLOGICAL FRACTURE PRESENCE: Primary | ICD-10-CM

## 2023-12-07 DIAGNOSIS — M81.0 AGE-RELATED OSTEOPOROSIS WITHOUT CURRENT PATHOLOGICAL FRACTURE: ICD-10-CM

## 2023-12-07 DIAGNOSIS — M19.90 OSTEOARTHRITIS, UNSPECIFIED OSTEOARTHRITIS TYPE, UNSPECIFIED SITE: ICD-10-CM

## 2023-12-07 NOTE — TELEPHONE ENCOUNTER
Caller: FATEMEH SOLORZANO- NOT ON VERBAL    Relationship to patient: Emergency Contact    Best call back number:    [phone]      Patient is needing: CALLER IS CALLING REGARDING THE SCHEDULING OF PROLIA INJECTION. THE CALLER STATES THAT THE PAPERWORK PATIENT HAS STATES THAT THE NEXT INJECTION SHOULD BE DONE AFTER 12.12.2023. CALLER STATES THAT PATIENT HAS NOT HEARD ANYTHING ABOUT WHEN SHE IS TO GET THIS DONE.

## 2023-12-07 NOTE — TELEPHONE ENCOUNTER
Orders have been placed and therapy plans have been signed.  [employer] message already sent to patient/ regarding this.

## 2023-12-12 ENCOUNTER — HOSPITAL ENCOUNTER (OUTPATIENT)
Dept: INFUSION THERAPY | Facility: HOSPITAL | Age: 63
Discharge: HOME | End: 2023-12-12
Admitting: FAMILY MEDICINE
Payer: MEDICARE

## 2023-12-12 VITALS
TEMPERATURE: 97.5 F | SYSTOLIC BLOOD PRESSURE: 110 MMHG | RESPIRATION RATE: 18 BRPM | DIASTOLIC BLOOD PRESSURE: 67 MMHG | WEIGHT: 128.97 LBS | OXYGEN SATURATION: 97 % | HEART RATE: 79 BPM | BODY MASS INDEX: 22.02 KG/M2 | HEIGHT: 64 IN

## 2023-12-12 DIAGNOSIS — M81.0 AGE-RELATED OSTEOPOROSIS WITHOUT CURRENT PATHOLOGICAL FRACTURE: ICD-10-CM

## 2023-12-12 DIAGNOSIS — M19.90 OSTEOARTHRITIS, UNSPECIFIED OSTEOARTHRITIS TYPE, UNSPECIFIED SITE: Primary | ICD-10-CM

## 2023-12-12 LAB
25(OH)D3 SERPL-MCNC: 47.1 NG/ML (ref 30–100)
ALBUMIN SERPL-MCNC: 4.7 G/DL (ref 3.5–5.2)
ALBUMIN/GLOB SERPL: 1.9 G/DL
ALP SERPL-CCNC: 49 U/L (ref 39–117)
ALT SERPL W P-5'-P-CCNC: 15 U/L (ref 1–33)
ANION GAP SERPL CALCULATED.3IONS-SCNC: 11 MMOL/L (ref 5–15)
AST SERPL-CCNC: 17 U/L (ref 1–32)
BILIRUB SERPL-MCNC: 0.2 MG/DL (ref 0–1.2)
BUN SERPL-MCNC: 10 MG/DL (ref 8–23)
BUN/CREAT SERPL: 14.5 (ref 7–25)
CALCIUM SPEC-SCNC: 9.4 MG/DL (ref 8.6–10.5)
CHLORIDE SERPL-SCNC: 102 MMOL/L (ref 98–107)
CO2 SERPL-SCNC: 26 MMOL/L (ref 22–29)
CREAT SERPL-MCNC: 0.69 MG/DL (ref 0.57–1)
EGFRCR SERPLBLD CKD-EPI 2021: 97.7 ML/MIN/1.73
GLOBULIN UR ELPH-MCNC: 2.5 GM/DL
GLUCOSE SERPL-MCNC: 142 MG/DL (ref 65–99)
POTASSIUM SERPL-SCNC: 3.7 MMOL/L (ref 3.5–5.2)
PROT SERPL-MCNC: 7.2 G/DL (ref 6–8.5)
SODIUM SERPL-SCNC: 139 MMOL/L (ref 136–145)

## 2023-12-12 PROCEDURE — 36415 COLL VENOUS BLD VENIPUNCTURE: CPT

## 2023-12-12 PROCEDURE — 82306 VITAMIN D 25 HYDROXY: CPT | Performed by: FAMILY MEDICINE

## 2023-12-12 PROCEDURE — 25010000002 DENOSUMAB 60 MG/ML SOLUTION PREFILLED SYRINGE: Performed by: FAMILY MEDICINE

## 2023-12-12 PROCEDURE — 80053 COMPREHEN METABOLIC PANEL: CPT | Performed by: FAMILY MEDICINE

## 2023-12-12 PROCEDURE — 96372 THER/PROPH/DIAG INJ SC/IM: CPT

## 2023-12-12 RX ADMIN — DENOSUMAB 60 MG: 60 INJECTION SUBCUTANEOUS at 17:50

## 2023-12-20 ENCOUNTER — ANESTHESIA EVENT (OUTPATIENT)
Dept: GASTROENTEROLOGY | Facility: HOSPITAL | Age: 63
End: 2023-12-20
Payer: MEDICARE

## 2023-12-20 NOTE — ANESTHESIA PREPROCEDURE EVALUATION
Anesthesia Evaluation     NPO Solid Status: > 8 hours  NPO Liquid Status: > 2 hours           Airway   Mallampati: II  TM distance: >3 FB  No difficulty expected and Anterior  Dental - normal exam     Pulmonary - negative pulmonary ROS and normal exam   Cardiovascular - normal exam    (+) hyperlipidemia    ROS comment: Hx palpitations    Neuro/Psych  (+) headaches  GI/Hepatic/Renal/Endo    (+) GERD, liver disease fatty liver disease    Musculoskeletal     (+) neck pain  Abdominal    Substance History      OB/GYN          Other   arthritis,                       Anesthesia Plan    ASA 2     general   total IV anesthesia    Anesthetic plan, risks, benefits, and alternatives have been provided, discussed and informed consent has been obtained with: patient and other.  Pre-procedure education provided  Plan discussed with CRNA.        CODE STATUS:

## 2023-12-21 ENCOUNTER — ANESTHESIA (OUTPATIENT)
Dept: GASTROENTEROLOGY | Facility: HOSPITAL | Age: 63
End: 2023-12-21
Payer: MEDICARE

## 2023-12-21 ENCOUNTER — HOSPITAL ENCOUNTER (OUTPATIENT)
Facility: HOSPITAL | Age: 63
Setting detail: HOSPITAL OUTPATIENT SURGERY
Discharge: HOME | End: 2023-12-21
Attending: SURGERY | Admitting: SURGERY
Payer: MEDICARE

## 2023-12-21 VITALS
HEIGHT: 63 IN | HEART RATE: 66 BPM | SYSTOLIC BLOOD PRESSURE: 114 MMHG | BODY MASS INDEX: 22.27 KG/M2 | DIASTOLIC BLOOD PRESSURE: 74 MMHG | RESPIRATION RATE: 16 BRPM | OXYGEN SATURATION: 99 % | WEIGHT: 125.66 LBS | TEMPERATURE: 98.2 F

## 2023-12-21 DIAGNOSIS — K21.00 GASTROESOPHAGEAL REFLUX DISEASE WITH ESOPHAGITIS WITHOUT HEMORRHAGE: ICD-10-CM

## 2023-12-21 DIAGNOSIS — Z86.010 HISTORY OF COLONIC POLYPS: ICD-10-CM

## 2023-12-21 DIAGNOSIS — R13.12 OROPHARYNGEAL DYSPHAGIA: ICD-10-CM

## 2023-12-21 DIAGNOSIS — Z12.11 SCREENING FOR MALIGNANT NEOPLASM OF COLON: ICD-10-CM

## 2023-12-21 DIAGNOSIS — K31.A0 INTESTINAL METAPLASIA OF GASTRIC MUCOSA: ICD-10-CM

## 2023-12-21 PROCEDURE — 25010000002 PROPOFOL 10 MG/ML EMULSION: Performed by: NURSE ANESTHETIST, CERTIFIED REGISTERED

## 2023-12-21 PROCEDURE — 25810000003 LACTATED RINGERS PER 1000 ML: Performed by: ANESTHESIOLOGY

## 2023-12-21 PROCEDURE — 88305 TISSUE EXAM BY PATHOLOGIST: CPT | Performed by: SURGERY

## 2023-12-21 RX ORDER — PROPOFOL 10 MG/ML
VIAL (ML) INTRAVENOUS AS NEEDED
Status: DISCONTINUED | OUTPATIENT
Start: 2023-12-21 | End: 2023-12-21 | Stop reason: SURG

## 2023-12-21 RX ORDER — SODIUM CHLORIDE 0.9 % (FLUSH) 0.9 %
3 SYRINGE (ML) INJECTION EVERY 12 HOURS SCHEDULED
Status: DISCONTINUED | OUTPATIENT
Start: 2023-12-21 | End: 2023-12-21 | Stop reason: HOSPADM

## 2023-12-21 RX ORDER — LIDOCAINE HYDROCHLORIDE 20 MG/ML
INJECTION, SOLUTION EPIDURAL; INFILTRATION; INTRACAUDAL; PERINEURAL AS NEEDED
Status: DISCONTINUED | OUTPATIENT
Start: 2023-12-21 | End: 2023-12-21 | Stop reason: SURG

## 2023-12-21 RX ORDER — SODIUM CHLORIDE 9 MG/ML
40 INJECTION, SOLUTION INTRAVENOUS AS NEEDED
Status: DISCONTINUED | OUTPATIENT
Start: 2023-12-21 | End: 2023-12-21 | Stop reason: HOSPADM

## 2023-12-21 RX ORDER — SODIUM CHLORIDE 0.9 % (FLUSH) 0.9 %
10 SYRINGE (ML) INJECTION AS NEEDED
Status: DISCONTINUED | OUTPATIENT
Start: 2023-12-21 | End: 2023-12-21 | Stop reason: HOSPADM

## 2023-12-21 RX ORDER — SODIUM CHLORIDE, SODIUM LACTATE, POTASSIUM CHLORIDE, CALCIUM CHLORIDE 600; 310; 30; 20 MG/100ML; MG/100ML; MG/100ML; MG/100ML
30 INJECTION, SOLUTION INTRAVENOUS CONTINUOUS
Status: DISCONTINUED | OUTPATIENT
Start: 2023-12-21 | End: 2023-12-21 | Stop reason: HOSPADM

## 2023-12-21 RX ADMIN — PROPOFOL 50 MG: 10 INJECTION, EMULSION INTRAVENOUS at 12:48

## 2023-12-21 RX ADMIN — SODIUM CHLORIDE, POTASSIUM CHLORIDE, SODIUM LACTATE AND CALCIUM CHLORIDE 30 ML/HR: 600; 310; 30; 20 INJECTION, SOLUTION INTRAVENOUS at 11:28

## 2023-12-21 RX ADMIN — LIDOCAINE HYDROCHLORIDE 100 MG: 20 INJECTION, SOLUTION EPIDURAL; INFILTRATION; INTRACAUDAL; PERINEURAL at 12:48

## 2023-12-21 RX ADMIN — PROPOFOL 125 MCG/KG/MIN: 10 INJECTION, EMULSION INTRAVENOUS at 12:48

## 2023-12-21 NOTE — H&P
EGD and colonoscopy consultation        History of Present Illness  Panda Rinaldi is a 63 y.o. female presents to [facility] GENERAL SURGERY for EGD and colonoscopy consultation.     Patient presents today with complaints of heartburn and indigestion despite using OTC meds.  Patient does admit to some dysphagia issues with feeling as if pills get stuck in her anterior chest.  Patient reports that she seems to be having a lot of nausea at nighttime.  No vomiting.  Patient does have a history of intestinal metaplasia of the gastric mucosa.  Denies any melena.  Denies any epigastric pain.  Denies any pain before or after eating.     Patient denies any lower abdominal pain, change in bowel habit, or rectal bleeding.  Admits to family history of colon cancer with her mother.  Admits to history of colonic polyps.     7/21: EGD & colonoscopy ([place]): Gastric antral mucosa with mild reactive gastropathy and intestinal metaplasia; stomach: lesser curvature-intestinal metaplasia; transverse-2 tubular adenomas; ascending-tubular adenoma; sigmoid-tubular adenoma.     2011: colonoscopy ([place]).         Objective   Medical History        Past Medical History:   Diagnosis Date    Abnormal bone density screening 10/15/2014     osteoporosis- tried fosamax in the past    Acid reflux      Allergic 10year    Arthritis 07/15/2021     left hip pain. risk for falls.     Colon polyp 2021    Coronary artery disease 2018    Fatty liver 2022    Headache 06/01/1993    Heart disease      Hyperlipemia      Osteopenia      Osteoporosis 10/15/2014    Palpitations      Seasonal allergies      Shoulder pain              Surgical History         Past Surgical History:   Procedure Laterality Date    COLONOSCOPY   10/2015     ft. sales     COLONOSCOPY N/A 07/15/2021     Procedure: COLONOSCOPY with polypectomy/snare;  Surgeon: Adelfo Story MD;  Location: [facility] ENDOSCOPY;  Service: General;  Laterality: N/A;  colon polyps     ENDOSCOPY N/A 07/15/2021     Procedure: ESOPHAGOGASTRODUODENOSCOPY with biopsies;  Surgeon: Adelfo Story MD;  Location: [facility] ENDOSCOPY;  Service: General;  Laterality: N/A;  gastric nodule    SHOULDER SURGERY Right 2014     [facility]     UPPER GASTROINTESTINAL ENDOSCOPY                Medications Taking          Outpatient Medications Marked as Taking for the 8/30/23 encounter (Office Visit) with Miguel AprilMADELYN   Medication Sig Dispense Refill    cetirizine (zyrTEC) 10 MG tablet Take 1 tablet by mouth Daily. 90 tablet 1    Diclofenac Sodium (VOLTAREN) 1 % gel gel Apply 4 g topically to the appropriate area as directed 2 (Two) Times a Day. 2 g 2    ezetimibe (Zetia) 10 MG tablet Take 1 tablet by mouth Daily. 90 tablet 1    folic acid (FOLVITE) 1 MG tablet Take 1 tablet by mouth Daily for 90 days. 90 tablet 3    ibuprofen (ADVIL,MOTRIN) 800 MG tablet Take 1 tablet by mouth Every 6 (Six) Hours As Needed for Mild Pain . 90 tablet 0    lidocaine (LIDODERM) 5 % Place 1 patch on the skin as directed by provider Daily for 90 days. 90 patch 3    Lubricating Plus Eye Drops 0.5 % solution Administer 2 drops to both eyes Daily As Needed for Dry Eyes for up to 90 days. 70 each 3    metoprolol succinate XL (TOPROL-XL) 25 MG 24 hr tablet Take 1 tablet by mouth Daily. 90 tablet 1    vitamin D (ERGOCALCIFEROL) 1.25 MG (18973 UT) capsule capsule Take 1 capsule by mouth 1 (One) Time Per Week for 90 days. 12 capsule 3                 Allergies   Allergen Reactions    Hydrocodone-Acetaminophen Shortness Of Breath    Tramadol Shortness Of Breath    Codeine Unknown - High Severity    Azithromycin Unknown - Low Severity    Latex Unknown - Low Severity               Family History   Problem Relation Age of Onset    Heart attack Mother      Colon cancer Mother 65    Arthritis Mother      Heart disease Father      Breast cancer Sister      Stroke Other      Breast cancer Other      Malig Hyperthermia Neg Hx           Social History  "  Social History            Socioeconomic History    Marital status:    Tobacco Use    Smoking status: Former       Packs/day: 0.00       Years: 5.00       Pack years: 0.00       Types: Cigarettes       Start date: 1980       Quit date: 1985       Years since quittin.6       Passive exposure: Never    Smokeless tobacco: Never   Vaping Use    Vaping Use: Never used   Substance and Sexual Activity    Alcohol use: Yes       Comment: sometimes we have wine on holidays, usually 1-2 glasses    Drug use: Never    Sexual activity: Not Currently       Partners: Male       Comment: Not sexually active in years.            Review of Systems   Constitutional:  Negative for chills and fever.   HENT:  Positive for trouble swallowing.    Gastrointestinal:  Positive for nausea, GERD and indigestion. Negative for abdominal distention, abdominal pain, anal bleeding, blood in stool, constipation, diarrhea, rectal pain and vomiting.       Vital Signs:   Pulse 64   Ht 162.6 cm (64\")   Wt 58.1 kg (128 lb)   BMI 21.97 kg/m²      Physical Exam  Vitals and nursing note reviewed.   Constitutional:       General: She is not in acute distress.     Appearance: Normal appearance.   HENT:      Head: Normocephalic.   Cardiovascular:      Rate and Rhythm: Normal rate.   Pulmonary:      Effort: Pulmonary effort is normal.      Breath sounds: No stridor.   Abdominal:      Palpations: Abdomen is soft.      Tenderness: There is no guarding.   Musculoskeletal:         General: No deformity. Normal range of motion.   Skin:     General: Skin is warm and dry.      Coloration: Skin is not jaundiced.   Neurological:      General: No focal deficit present.      Mental Status: She is alert and oriented to person, place, and time.   Psychiatric:         Mood and Affect: Mood normal.         Thought Content: Thought content normal.               Result Review   :            []  Laboratory  []  Radiology  []  Pathology  []  " Microbiology  []  EKG/Telemetry   []  Cardiology/Vascular   []  Old records  I spent 15 minutes caring for Tae on this date of service. This time includes time spent by me in the following activities: reviewing tests, obtaining and/or reviewing a separately obtained history, performing a medically appropriate examination and/or evaluation, ordering medications, tests, or procedures, and documenting information in the medical record.        Assessment   Assessment and Plan    Diagnoses and all orders for this visit:     1. Gastroesophageal reflux disease without esophagitis (Primary)     2. Oropharyngeal dysphagia     3. Intestinal metaplasia of gastric mucosa     4. Screening for malignant neoplasm of colon     5. History of colonic polyps     Other orders  -     PEG-KCl-NaCl-NaSulf-Na Asc-C (MOVIPREP) 100 g reconstituted solution powder; Take 1,000 mL by mouth 1 (One) Time for 1 dose.  Dispense: 1000 mL; Refill: 0  -     simethicone (Gas-X) 80 MG chewable tablet; Take 2 tablets PO after completing movi prep and 2 tablets PO 4 hours prior to procedure.  Dispense: 4 tablet; Refill: 0           Follow Up   Return for Schedule EGD and colonoscopy with Dr. Story on 12/21/2023 at [facility].     Hospital arrival time: 11:00.     Possible risks/complications, benefits, and alternatives to surgical or invasive procedures have been explained to patient and/or legal guardian.     Patient has been evaluated and can tolerate anesthesia and/or sedation. Risks, benefits, and alternatives to anesthesia and sedation have been explained to the patient and/or legal guardian. Patient verbalizes understanding and is willing to proceed with the above plan.      Patient was given instructions and counseling regarding her condition or for health maintenance advice. Please see specific information pulled into the AVS if appropriate.

## 2023-12-21 NOTE — ANESTHESIA POSTPROCEDURE EVALUATION
Patient: Panda Rinaldi    Procedure Summary       Date: 12/21/23 Room / Location: [facility] ENDOSCOPY 1 / [facility] ENDOSCOPY    Anesthesia Start: 1247 Anesthesia Stop: 1331    Procedures:       ESOPHAGOGASTRODUODENOSCOPY with biopsies      COLONOSCOPY with cold snare Diagnosis:       Gastroesophageal reflux disease with esophagitis without hemorrhage      Oropharyngeal dysphagia      Intestinal metaplasia of gastric mucosa      Screening for malignant neoplasm of colon      History of colonic polyps      (Gastroesophageal reflux disease with esophagitis without hemorrhage [K21.00])      (Oropharyngeal dysphagia [R13.12])      (Intestinal metaplasia of gastric mucosa [K31.A0])      (Screening for malignant neoplasm of colon [Z12.11])      (History of colonic polyps [Z86.010])    Surgeons: Adelfo Story MD Provider: Lary Ferrell CRNA    Anesthesia Type: general ASA Status: 2            Anesthesia Type: general    Vitals  Vitals Value Taken Time   /78 12/21/23 1350   Temp 36.8 °C (98.2 °F) 12/21/23 1350   Pulse 61 12/21/23 1353   Resp 16 12/21/23 1350   SpO2 98 % 12/21/23 1353   Vitals shown include unfiled device data.        Post Anesthesia Care and Evaluation    Post-procedure mental status: acceptable.  Pain management: satisfactory to patient    Airway patency: patent  Anesthetic complications: No anesthetic complications    Cardiovascular status: acceptable  Respiratory status: acceptable    Comments: Per chart review

## 2023-12-26 LAB
CYTO UR: NORMAL
LAB AP CASE REPORT: NORMAL
LAB AP CLINICAL INFORMATION: NORMAL
PATH REPORT.FINAL DX SPEC: NORMAL
PATH REPORT.GROSS SPEC: NORMAL

## 2024-01-08 DIAGNOSIS — E78.5 HYPERLIPIDEMIA, UNSPECIFIED HYPERLIPIDEMIA TYPE: ICD-10-CM

## 2024-01-08 DIAGNOSIS — R00.2 PALPITATIONS: ICD-10-CM

## 2024-01-08 DIAGNOSIS — J30.2 SEASONAL ALLERGIC RHINITIS, UNSPECIFIED TRIGGER: ICD-10-CM

## 2024-01-08 RX ORDER — EZETIMIBE 10 MG/1
10 TABLET ORAL DAILY
Qty: 90 TABLET | Refills: 1 | Status: SHIPPED | OUTPATIENT
Start: 2024-01-08

## 2024-01-08 RX ORDER — CETIRIZINE HYDROCHLORIDE 10 MG/1
10 TABLET ORAL DAILY
Qty: 90 TABLET | Refills: 1 | Status: SHIPPED | OUTPATIENT
Start: 2024-01-08

## 2024-01-08 RX ORDER — METOPROLOL SUCCINATE 25 MG/1
25 TABLET, EXTENDED RELEASE ORAL DAILY
Qty: 90 TABLET | Refills: 1 | Status: SHIPPED | OUTPATIENT
Start: 2024-01-08

## 2024-01-11 ENCOUNTER — OFFICE VISIT (OUTPATIENT)
Dept: SURGERY | Facility: CLINIC | Age: 64
End: 2024-01-11
Payer: MEDICARE

## 2024-01-11 VITALS
HEART RATE: 62 BPM | HEIGHT: 63 IN | SYSTOLIC BLOOD PRESSURE: 107 MMHG | BODY MASS INDEX: 22.15 KG/M2 | WEIGHT: 125 LBS | DIASTOLIC BLOOD PRESSURE: 67 MMHG

## 2024-01-11 DIAGNOSIS — K29.80 DUODENITIS: ICD-10-CM

## 2024-01-11 DIAGNOSIS — D36.9 TUBULAR ADENOMA: ICD-10-CM

## 2024-01-11 DIAGNOSIS — Z98.890 S/P ENDOSCOPY: Primary | ICD-10-CM

## 2024-01-11 DIAGNOSIS — K31.A0 INTESTINAL METAPLASIA OF GASTRIC MUCOSA: ICD-10-CM

## 2024-01-11 DIAGNOSIS — Z98.890 S/P COLONOSCOPY WITH POLYPECTOMY: ICD-10-CM

## 2024-01-11 PROCEDURE — 1160F RVW MEDS BY RX/DR IN RCRD: CPT | Performed by: NURSE PRACTITIONER

## 2024-01-11 PROCEDURE — 1159F MED LIST DOCD IN RCRD: CPT | Performed by: NURSE PRACTITIONER

## 2024-01-11 PROCEDURE — 99212 OFFICE O/P EST SF 10 MIN: CPT | Performed by: NURSE PRACTITIONER

## 2024-01-11 RX ORDER — OMEPRAZOLE 40 MG/1
40 CAPSULE, DELAYED RELEASE ORAL DAILY
Qty: 90 CAPSULE | Refills: 1 | Status: SHIPPED | OUTPATIENT
Start: 2024-01-11 | End: 2024-04-10

## 2024-01-11 NOTE — PROGRESS NOTES
Chief Complaint: Post-op Follow-up    Subjective      Follow-up visit       History of Present Illness  Panda Rinaldi is a 63 y.o. female presents to [facility] GENERAL SURGERY for follow-up visit.    Patient presents today for follow-up visit after undergoing EGD and colonoscopy on 12/21/2023 performed by Dr. Adelfo Story.  Patient was with mild chronic duodenitis and chronic inactive gastritis and intestinal metaplasia of the stomach per EGD/pathology.    Patient was also with 2 sessile tubular adenomas of the transverse colon per colonoscopy/pathology resection and retrieval were complete.    Results for orders placed or performed during the hospital encounter of 12/21/23   Tissue Pathology Exam    Specimen: A: Large Intestine, Transverse Colon; Tissue    B: Large Intestine, Transverse Colon; Tissue    C: Small Intestine; Tissue    D: Gastric, Antrum; Tissue    E: Gastric, Body; Tissue    F: GE Junction; Tissue    G: Esophagus, Mid; Tissue   Result Value Ref Range    Case Report       Surgical Pathology Report                         Case: [id number]                                  Authorizing Provider:  Adelfo Story MD      Collected:           12/21/2023 01:02 PM          Ordering Location:     [facility] Received:            12/22/2023 05:53 AM                                 SUITES                                                                       Pathologist:           Daisy Bess DO                                                       Specimens:   1) - Large Intestine, Transverse Colon, distal transverse colon polyp cold snare                    2) - Large Intestine, Transverse Colon, proximal transverse colon polyp cold sare                   3) - Small Intestine, duodenum biopsy                                                               4) - Gastric, Antrum, gastric antrum biopsy                                                         5) - Gastric,  "Body, gastric body biopsy                                                              6) - GE Junction, ge junction biopsy                                                                7) - Esophagus, Mid, mid esophagus biopsy                                                  Clinical Information       Gastroesophageal reflux disease with esophagitis without hemorrhage  Oropharyngeal dysphagia  Intestinal metaplasia of gastric mucosa  Screening for malignant neoplasm of colon  History of colonic polyps      Final Diagnosis       1.  Distal transverse colon polyp, biopsy:   - Tubular adenoma      2.  Proximal transverse colon polyp, biopsy:   - Tubular adenoma      3. Duodenum, biopsy:   - Mild chronic duodenitis    4.  Stomach, antrum, biopsy:   - Gastric antrum mucosa with mild chronic inactive gastritis and intestinal metaplasia   - Negative for Helicobacter pylori on routine H&E stain   - Negative for dysplasia and malignancy      5. Stomach, body, biopsy:   - Gastric body/fundus mucosa with no significant pathologic change   - Negative for Helicobacter pylori on routine H&E stain   - Negative for intestinal metaplasia, dysplasia and malignancy      6. Gastroesophageal junction, biopsy:   - Squamocolumnar mucosa with intestinal metaplasia   - Negative for dysplasia and malignancy      7.Mid esophagus, biopsy:   - Squamous mucosa with no significant pathologic change   - Negative for eosinophilic esophagitis        Gross Description       1. Large Intestine, Transverse Colon.  Received in formalin and labeled \" distal transverse colon polyp cold snare\" is a 0.3 cm fragment of tan soft tissue. The specimen is entirely submitted in one cassette.    2. Large Intestine, Transverse Colon.  Received in formalin and labeled \" proximal transverse colon polyp cold snare\" is a 0.3 cm fragment of tan soft tissue. The specimen is entirely submitted in one cassette.    3. Small Intestine.  Received in formalin and labeled \" " "duodenum\" are two fragments of tan soft tissue measuring 0.3-0.4 cm in greatest dimension. The specimen is entirely submitted in one cassette.    4. Gastric, Antrum.  Received in formalin and labeled \" gastric antrum\" is a 0.7 cm aggregate of tan soft tissue fragments. The specimen is entirely submitted in one cassette.    5. Gastric, Body.  Received in formalin and labeled \" gastric body\" is a 0.7 cm aggregate of tan soft tissue fragments. The specimen is entirely submitted in one cassette.    6. GE Junction.  Received in formalin and labeled \" GE junction\" is a 0.7 cm aggregate of tan soft tissue fragments. The specimen is entirely submitted in one cassette.    7. Esophagus, Mid.  Received in formalin and labeled \" midesophagus\" are two fragments of tan soft tissue measuring 0.2-0.3 cm in greatest dimension. The specimen is entirely submitted in one cassette.   MENDEZ      Microscopic Description       Microscopic examination performed.       Colonoscopy is performed without difficulty.  The patient tolerated the procedure well.  EGD was performed without difficulty.    Patient denies any postoperative complications.      Objective     Past Medical History:   Diagnosis Date    Abnormal bone density screening 10/15/2014    osteoporosis- tried fosamax in the past    Acid reflux     Allergic 10year    Arthritis 07/15/2021    left hip pain. risk for falls.     Colon polyp 2021    Coronary artery disease 2018    Fatty liver 2022    Headache 06/01/1993    Heart disease     Hyperlipemia     Osteopenia     Osteoporosis 10/15/2014    Palpitations     Seasonal allergies     Shoulder pain        Past Surgical History:   Procedure Laterality Date    COLONOSCOPY  10/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     COLONOSCOPY N/A 07/15/2021    Procedure: COLONOSCOPY with polypectomy/snare;  Surgeon: Adelfo Story MD;  Location: [facility] ENDOSCOPY;  Service: General;  Laterality: N/A;  colon polyps    COLONOSCOPY N/A 12/21/2023    Procedure: COLONOSCOPY " with cold snare;  Surgeon: Adelfo Story MD;  Location: [facility] ENDOSCOPY;  Service: General;  Laterality: N/A;  colon polyp    ENDOSCOPY N/A 07/15/2021    Procedure: ESOPHAGOGASTRODUODENOSCOPY with biopsies;  Surgeon: Adelfo Story MD;  Location: [facility] ENDOSCOPY;  Service: General;  Laterality: N/A;  gastric nodule    ENDOSCOPY N/A 12/21/2023    Procedure: ESOPHAGOGASTRODUODENOSCOPY with biopsies;  Surgeon: Adelfo Story MD;  Location: [facility] ENDOSCOPY;  Service: General;  Laterality: N/A;  normal    SHOULDER SURGERY Right 2014    [facility]     UPPER GASTROINTESTINAL ENDOSCOPY         Outpatient Medications Marked as Taking for the 1/11/24 encounter (Office Visit) with Yana Rivera APRN   Medication Sig Dispense Refill    Acetaminophen 325 MG capsule       Carboxymethylcell-Glycerin PF 0.5-1 % solution Apply 0.5 % to eye(s) as directed by provider.      cetirizine (zyrTEC) 10 MG tablet Take 1 tablet by mouth Daily. 90 tablet 1    cyclobenzaprine (FLEXERIL) 10 MG tablet       Diclofenac Sodium (VOLTAREN) 1 % gel gel Apply 4 g topically to the appropriate area as directed 2 (Two) Times a Day. 2 g 2    ezetimibe (Zetia) 10 MG tablet Take 1 tablet by mouth Daily. 90 tablet 1    folic acid (FOLVITE) 1 MG tablet Take 1 tablet by mouth Daily.      ibuprofen (ADVIL,MOTRIN) 800 MG tablet Take 1 tablet by mouth Every 6 (Six) Hours As Needed for Mild Pain . 90 tablet 0    Inositol Niacinate (NIACIN FLUSH FREE PO) Take  by mouth.      lidocaine (LIDODERM) 5 % Place 1 patch on the skin as directed by provider Daily.      metoprolol succinate XL (TOPROL-XL) 25 MG 24 hr tablet Take 1 tablet by mouth Daily. 90 tablet 1    Turmeric (QC Tumeric Complex) 500 MG capsule Take 1 capsule by mouth Daily.         Allergies   Allergen Reactions    Hydrocodone-Acetaminophen Shortness Of Breath    Tramadol Shortness Of Breath    Codeine Unknown - High Severity    Azithromycin Unknown - Low Severity    Kenalog [Triamcinolone  "Acetonide] Rash    Latex Unknown - Low Severity        Family History   Problem Relation Age of Onset    Heart attack Mother     Colon cancer Mother 65    Arthritis Mother     Heart disease Father     Breast cancer Sister     Stroke Other     Breast cancer Other     Malig Hyperthermia Neg Hx        Social History     Socioeconomic History    Marital status:    Tobacco Use    Smoking status: Former     Packs/day: 0.00     Years: 5.00     Additional pack years: 0.00     Total pack years: 0.00     Types: Cigarettes     Start date: 1980     Quit date: 1985     Years since quittin.0     Passive exposure: Never    Smokeless tobacco: Never   Vaping Use    Vaping Use: Never used   Substance and Sexual Activity    Alcohol use: Yes     Comment: sometimes we have wine on holidays, usually 1-2 glasses    Drug use: Never    Sexual activity: Defer     Partners: Male     Comment: Not sexually active in years.       Review of Systems   Constitutional:  Negative for chills and fever.   HENT:  Negative for trouble swallowing.    Gastrointestinal:  Positive for GERD and indigestion. Negative for abdominal distention, abdominal pain, anal bleeding, blood in stool, constipation, diarrhea, nausea, rectal pain and vomiting.        Vital Signs:   /67 (BP Location: Left arm)   Pulse 62   Ht 160 cm (63\")   Wt 56.7 kg (125 lb)   BMI 22.14 kg/m²      Physical Exam  Vitals and nursing note reviewed.   Constitutional:       General: She is not in acute distress.     Appearance: Normal appearance.   HENT:      Head: Normocephalic.   Cardiovascular:      Rate and Rhythm: Normal rate.   Pulmonary:      Effort: Pulmonary effort is normal.      Breath sounds: No stridor.   Abdominal:      Palpations: Abdomen is soft.      Tenderness: There is no guarding.   Musculoskeletal:         General: No deformity. Normal range of motion.   Skin:     General: Skin is warm and dry.      Coloration: Skin is not jaundiced. "   Neurological:      General: No focal deficit present.      Mental Status: She is alert and oriented to person, place, and time.   Psychiatric:         Mood and Affect: Mood normal.         Thought Content: Thought content normal.          Result Review :          []  Laboratory  []  Radiology  []  Pathology  []  Microbiology  []  EKG/Telemetry   []  Cardiology/Vascular   []  Old records  Reviewed Dr. Story's operative and pathology report.     Assessment and Plan    Diagnoses and all orders for this visit:    1. S/P endoscopy (Primary)    2. Duodenitis    3. Intestinal metaplasia of gastric mucosa    4. S/P colonoscopy with polypectomy    5. Tubular adenoma    Other orders  -     omeprazole (priLOSEC) 40 MG capsule; Take 1 capsule by mouth Daily for 90 days.  Dispense: 90 capsule; Refill: 1        Follow Up   Return for Repeat EGD in 3 years; re-screen colon in 5 years; follow-up in the interim as needed.    I have went ahead and started the patient on omeprazole to see if this will help some of her acid problems.  Have instructed the patient avoid ibuprofen aspirin and any NSAID related medications.    Stop ASA and nonsteroidal anti-inflammatory drugs such as ibuprofen  Start PPI or H2 receptor blocker medications as prescribed.    Avoid high fat diets  Increase fiber intake    Per AGA guidelines patient will follow-up for colonoscopy surveillance as directed.    Patient was given instructions and counseling regarding her condition or for health maintenance advice. Please see specific information pulled into the AVS if appropriate.

## 2024-01-26 ENCOUNTER — OFFICE VISIT (OUTPATIENT)
Dept: FAMILY MEDICINE CLINIC | Facility: CLINIC | Age: 64
End: 2024-01-26
Payer: MEDICARE

## 2024-01-26 VITALS
WEIGHT: 129 LBS | HEIGHT: 63 IN | HEART RATE: 63 BPM | SYSTOLIC BLOOD PRESSURE: 106 MMHG | TEMPERATURE: 98.1 F | BODY MASS INDEX: 22.86 KG/M2 | OXYGEN SATURATION: 96 % | DIASTOLIC BLOOD PRESSURE: 68 MMHG

## 2024-01-26 DIAGNOSIS — K21.00 GASTROESOPHAGEAL REFLUX DISEASE WITH ESOPHAGITIS WITHOUT HEMORRHAGE: ICD-10-CM

## 2024-01-26 DIAGNOSIS — M19.90 OSTEOARTHRITIS, UNSPECIFIED OSTEOARTHRITIS TYPE, UNSPECIFIED SITE: ICD-10-CM

## 2024-01-26 DIAGNOSIS — M81.0 AGE-RELATED OSTEOPOROSIS WITHOUT CURRENT PATHOLOGICAL FRACTURE: ICD-10-CM

## 2024-01-26 DIAGNOSIS — Z23 NEED FOR PNEUMOCOCCAL 20-VALENT CONJUGATE VACCINATION: Primary | ICD-10-CM

## 2024-01-26 DIAGNOSIS — E78.2 MIXED HYPERLIPIDEMIA: ICD-10-CM

## 2024-01-26 DIAGNOSIS — Z23 NEED FOR INFLUENZA VACCINATION: ICD-10-CM

## 2024-01-26 LAB
25(OH)D3 SERPL-MCNC: 52.2 NG/ML (ref 30–100)
ALBUMIN SERPL-MCNC: 4.7 G/DL (ref 3.5–5.2)
ALBUMIN/GLOB SERPL: 1.3 G/DL
ALP SERPL-CCNC: 49 U/L (ref 39–117)
ALT SERPL W P-5'-P-CCNC: 21 U/L (ref 1–33)
ANION GAP SERPL CALCULATED.3IONS-SCNC: 9 MMOL/L (ref 5–15)
AST SERPL-CCNC: 23 U/L (ref 1–32)
BILIRUB SERPL-MCNC: 0.5 MG/DL (ref 0–1.2)
BUN SERPL-MCNC: 12 MG/DL (ref 8–23)
BUN/CREAT SERPL: 17.1 (ref 7–25)
CALCIUM SPEC-SCNC: 10.2 MG/DL (ref 8.6–10.5)
CHLORIDE SERPL-SCNC: 105 MMOL/L (ref 98–107)
CHOLEST SERPL-MCNC: 199 MG/DL (ref 0–200)
CO2 SERPL-SCNC: 26 MMOL/L (ref 22–29)
CREAT SERPL-MCNC: 0.7 MG/DL (ref 0.57–1)
EGFRCR SERPLBLD CKD-EPI 2021: 97.3 ML/MIN/1.73
GLOBULIN UR ELPH-MCNC: 3.5 GM/DL
GLUCOSE SERPL-MCNC: 107 MG/DL (ref 65–99)
HDLC SERPL-MCNC: 41 MG/DL (ref 40–60)
LDLC SERPL CALC-MCNC: 128 MG/DL (ref 0–100)
LDLC/HDLC SERPL: 3.03 {RATIO}
POTASSIUM SERPL-SCNC: 5.4 MMOL/L (ref 3.5–5.2)
PROT SERPL-MCNC: 8.2 G/DL (ref 6–8.5)
SODIUM SERPL-SCNC: 140 MMOL/L (ref 136–145)
TRIGL SERPL-MCNC: 169 MG/DL (ref 0–150)
VLDLC SERPL-MCNC: 30 MG/DL (ref 5–40)

## 2024-01-26 PROCEDURE — 80061 LIPID PANEL: CPT | Performed by: FAMILY MEDICINE

## 2024-01-26 PROCEDURE — 82306 VITAMIN D 25 HYDROXY: CPT | Performed by: FAMILY MEDICINE

## 2024-01-26 PROCEDURE — 80053 COMPREHEN METABOLIC PANEL: CPT | Performed by: FAMILY MEDICINE

## 2024-01-26 NOTE — PROGRESS NOTES
Chief Complaint  Chief Complaint   Patient presents with    Colonoscopy Update    Biopsy    Medication Concern    Hyperlipidemia     Pt would like to have a Lipid lab check       HPI:  Panda Rinaldi presents to [facility]    Panda Rinaldi is a 63-year-old female who presents for review of laboratories. An adult male accompanies her.    She had a biopsy completed of her stomach.     She had experienced an illness from a virus. She was prescribed medication for a severe sinus infection.     She takes omeprazole for her heartburn. She denies having frequent heartburn.     She had an upper endoscope completed by Dr. Story. She will get a repeat scope completed in approximately 3 years.     Procedures     Past History:  Medical History: has a past medical history of Abnormal bone density screening (10/15/2014), Acid reflux, Allergic (10year), Arthritis (07/15/2021), Colon polyp (2021), Coronary artery disease (2018), Fatty liver (2022), Headache (06/01/1993), Heart disease, Hyperlipemia, Osteopenia, Osteoporosis (10/15/2014), Palpitations, Seasonal allergies, and Shoulder pain.   Surgical History: has a past surgical history that includes Colonoscopy (10/2015); Shoulder surgery (Right, 2014); Esophagogastroduodenoscopy (N/A, 07/15/2021); Colonoscopy (N/A, 07/15/2021); Upper gastrointestinal endoscopy; Esophagogastroduodenoscopy (N/A, 12/21/2023); and Colonoscopy (N/A, 12/21/2023).   Family History: family history includes Arthritis in her mother; Breast cancer in her sister and another family member; Colon cancer (age of onset: 65) in her mother; Heart attack in her mother; Heart disease in her father; Stroke in an other family member.   Social History: reports that she quit smoking about 38 years ago. Her smoking use included cigarettes. She started smoking about 43 years ago. She has never been exposed to tobacco smoke. She has never used smokeless tobacco. She reports current alcohol  use. She reports that she does not use drugs.  Immunization History   Administered Date(s) Administered    COVID-19 (PFIZER) Purple Cap Monovalent 03/16/2021, 04/06/2021, 12/21/2021    Fluzone (or Fluarix & Flulaval for VFC) >6mos 10/13/2022, 01/26/2024    Fluzone Quad >6mos (Multi-dose) 10/01/2020    Influenza, Unspecified 10/01/2020    Pneumococcal Conjugate 20-Valent (PCV20) 01/26/2024    Shingrix 05/25/2021, 09/08/2021         Allergies: Hydrocodone-acetaminophen, Tramadol, Codeine, Azithromycin, Kenalog [triamcinolone acetonide], and Latex     Medications:  Current Outpatient Medications on File Prior to Visit   Medication Sig Dispense Refill    Acetaminophen 325 MG capsule       Carboxymethylcell-Glycerin PF 0.5-1 % solution Apply 0.5 % to eye(s) as directed by provider.      cetirizine (zyrTEC) 10 MG tablet Take 1 tablet by mouth Daily. 90 tablet 1    cholecalciferol (VITAMIN D3) 1.25 MG (61035 UT) capsule 1 capsule.      Diclofenac Sodium (VOLTAREN) 1 % gel gel Apply 4 g topically to the appropriate area as directed 2 (Two) Times a Day. 2 g 2    ezetimibe (Zetia) 10 MG tablet Take 1 tablet by mouth Daily. 90 tablet 1    folic acid (FOLVITE) 1 MG tablet Take 1 tablet by mouth Daily.      Inositol Niacinate (NIACIN FLUSH FREE PO) Take  by mouth.      lidocaine (LIDODERM) 5 % Place 1 patch on the skin as directed by provider Daily.      metoprolol succinate XL (TOPROL-XL) 25 MG 24 hr tablet Take 1 tablet by mouth Daily. 90 tablet 1    omeprazole (priLOSEC) 40 MG capsule Take 1 capsule by mouth Daily for 90 days. 90 capsule 1    Rimegepant Sulfate (Nurtec) 75 MG tablet dispersible tablet Take 1 tablet by mouth 1 (One) Time As Needed (headache) for up to 1 dose. 15 tablet 2    Turmeric (QC Tumeric Complex) 500 MG capsule Take 1 capsule by mouth Daily.      Unable to find Take 1 each by mouth 1 (One) Time. Med Name: GLUTATHRONE 500MG       Current Facility-Administered Medications on File Prior to Visit  "  Medication Dose Route Frequency Provider Last Rate Last Admin    denosumab (PROLIA) syringe 60 mg  60 mg Subcutaneous Once Lorri White MD            Health Maintenance Due   Topic Date Due    ANNUAL WELLNESS VISIT  10/13/2023       Vital Signs:   Vitals:    01/26/24 1203   BP: 106/68   BP Location: Left arm   Patient Position: Sitting   Cuff Size: Adult   Pulse: 63   Temp: 98.1 °F (36.7 °C)   TempSrc: Oral   SpO2: 96%   Weight: 58.5 kg (129 lb)   Height: 160 cm (63\")      Body mass index is 22.85 kg/m².     ROS:  Review of Systems   Constitutional:  Negative for fatigue and fever.   HENT:  Negative for congestion, ear pain and sinus pressure.    Respiratory:  Negative for cough, chest tightness and shortness of breath.    Cardiovascular:  Negative for chest pain, palpitations and leg swelling.   Gastrointestinal:  Positive for GERD. Negative for abdominal pain and diarrhea.   Genitourinary:  Negative for dysuria and frequency.   Neurological:  Negative for speech difficulty, headache and confusion.   Psychiatric/Behavioral:  Negative for agitation and behavioral problems.           Physical Exam  Vitals reviewed.   Constitutional:       Appearance: Normal appearance. She is well-developed.   HENT:      Head: Normocephalic and atraumatic.      Nose: Nose normal.   Eyes:      General: Lids are normal.      Conjunctiva/sclera: Conjunctivae normal.      Pupils: Pupils are equal, round, and reactive to light.   Neck:      Thyroid: No thyromegaly.      Trachea: Trachea normal.   Pulmonary:      Effort: Pulmonary effort is normal. No respiratory distress.   Skin:     General: Skin is warm and dry.   Neurological:      Mental Status: She is alert and oriented to person, place, and time.      GCS: GCS eye subscore is 4. GCS verbal subscore is 5. GCS motor subscore is 6.   Psychiatric:         Attention and Perception: Attention normal.         Mood and Affect: Mood normal.         Speech: Speech normal.         " Behavior: Behavior normal. Behavior is cooperative.          Result Review        Diagnoses and all orders for this visit:    1. Need for pneumococcal 20-valent conjugate vaccination (Primary)  -     Pneumococcal Conjugate Vaccine 20-Valent All    2. Need for influenza vaccination  -     Fluzone (or Fluarix & Flulaval for VFC) >6mos    3. Mixed hyperlipidemia  -     Lipid Panel  -     Cancel: Comprehensive Metabolic Panel    4. Age-related osteoporosis without current pathological fracture  -     Comprehensive Metabolic Panel  -     Cancel: Calcium  -     Vitamin D 25 Hydroxy    5. Osteoarthritis, unspecified osteoarthritis type, unspecified site  -     Comprehensive Metabolic Panel  -     Cancel: Calcium  -     Vitamin D 25 Hydroxy    6. Gastroesophageal reflux disease with esophagitis without hemorrhage    1. Heartburn.  We discussed long-term side effects of long-term use of omeprazole. We also discussed about Parker's esophagus and dysplasia.    2. Tubular adenoma.  The precancerous polyp was not cancer. She is due for her next colonoscopy in 5 years.    3. Hypercholesterolemia.  I will recheck her cholesterol today.    BMI is within normal parameters. No other follow-up for BMI required.       Smoking Cessation:    Panda Rinaldi  reports that she quit smoking about 38 years ago. Her smoking use included cigarettes. She started smoking about 43 years ago. She has never been exposed to tobacco smoke. She has never used smokeless tobacco.     Follow Up   Return in about 3 months (around 4/26/2024).  Patient was given instructions and counseling regarding her condition or for health maintenance advice. Please see specific information pulled into the AVS if appropriate.       Lorri White MD    Transcribed from ambient dictation for Lorri White MD by Noemy Flores.  01/26/24   13:47 EST    Patient or patient representative verbalized consent to the visit recording.  I have personally performed the  services described in this document as transcribed by the above individual, and it is both accurate and complete.

## 2024-01-28 RX ORDER — ROSUVASTATIN CALCIUM 5 MG/1
5 TABLET, COATED ORAL DAILY
Qty: 90 TABLET | Refills: 1 | Status: SHIPPED | OUTPATIENT
Start: 2024-01-28 | End: 2024-04-27

## 2024-02-06 ENCOUNTER — OFFICE VISIT (OUTPATIENT)
Dept: ORTHOPEDIC SURGERY | Facility: CLINIC | Age: 64
End: 2024-02-06
Payer: MEDICARE

## 2024-02-06 VITALS
BODY MASS INDEX: 22.86 KG/M2 | DIASTOLIC BLOOD PRESSURE: 77 MMHG | SYSTOLIC BLOOD PRESSURE: 116 MMHG | OXYGEN SATURATION: 97 % | HEART RATE: 73 BPM | HEIGHT: 63 IN | WEIGHT: 129 LBS

## 2024-02-06 DIAGNOSIS — M79.641 RIGHT HAND PAIN: Primary | ICD-10-CM

## 2024-02-06 DIAGNOSIS — M67.441 GANGLION CYST OF BOTH HANDS: ICD-10-CM

## 2024-02-06 DIAGNOSIS — M67.442 GANGLION CYST OF BOTH HANDS: ICD-10-CM

## 2024-02-06 NOTE — PROGRESS NOTES
"Chief Complaint  Pain and Initial Evaluation of the Right Hand     Subjective      Panda Rinaldi presents to [facility] for initial evaluation of the right hand. She has pain in the right hand index finger.  She notes has a cyst on the DIP joint that has decreased in size.  She has pain at times with movement.  She has used medical tape to tighten around the finger and kind of \"smash\" the cyst.  She has a cyst on the right hand thumb also. She has pain in the left hand middle digit.  She takes tumeric.     Allergies   Allergen Reactions    Hydrocodone-Acetaminophen Shortness Of Breath    Tramadol Shortness Of Breath    Codeine Unknown - High Severity    Azithromycin Unknown - Low Severity    Kenalog [Triamcinolone Acetonide] Rash    Latex Unknown - Low Severity        Social History     Socioeconomic History    Marital status:    Tobacco Use    Smoking status: Former     Packs/day: 0.00     Years: 5.00     Additional pack years: 0.00     Total pack years: 0.00     Types: Cigarettes     Start date: 1980     Quit date: 1985     Years since quittin.1     Passive exposure: Never    Smokeless tobacco: Never   Vaping Use    Vaping Use: Never used   Substance and Sexual Activity    Alcohol use: Yes     Comment: sometimes we have wine on holidays, usually 1-2 glasses    Drug use: Never    Sexual activity: Defer     Partners: Male     Comment: Not sexually active in years.        I reviewed the patient's chief complaint, history of present illness, review of systems, past medical history, surgical history, family history, social history, medications, and allergy list.     Review of Systems     Constitutional: Denies fevers, chills, weight loss  Cardiovascular: Denies chest pain, shortness of breath  Skin: Denies rashes, acute skin changes  Neurologic: Denies headache, loss of consciousness        Vital Signs:   /77   Pulse 73   Ht 160 cm (63\")   Wt 58.5 kg (129 lb)  "  SpO2 97%   BMI 22.85 kg/m²          Physical Exam  General: Alert. No acute distress    Ortho Exam        RIGHT HAND Negative Compression testing/ Negative Tinels. NegativeFinkelsteins. Negative Logan's testing. Negative CMC grind testing. Negative Phalens. Full ROM of the hand, fingers, elbow and wrist. Negative Triggering of the digit. Sensation grossly intact to light touch, median, radial and ulnar nerve. Positive AIN, PIN and ulnar nerve motor function intact. Axillary nerve intact. Positive pulses.        Procedures      Imaging Results (Most Recent)       None             Result Review :             Assessment and Plan     Diagnoses and all orders for this visit:    1. Right hand pain (Primary)    2. Ganglion cyst of both hands        Discussed the treatment plan with the patient.     Discussed the treatment options with the patient, operative vs non-operative. The patient expressed understanding and wished to proceed with a index finger cyst removal.  Discussed let it grow and then discuss excision.     She has a history of the right thumb and the left ring finger is noted to be just arthritic.       Call or return if worsening symptoms.    Follow Up     PRN Call back and will discuss surgical intervention.       Patient was given instructions and counseling regarding her condition or for health maintenance advice. Please see specific information pulled into the AVS if appropriate.     Scribed for Kota Richey MD by Lesvia Bourne MA.  02/06/24   13:28 EST    I have personally performed the services described in this document as scribed by the above individual and it is both accurate and complete. Kota Richey MD 02/06/24

## 2024-02-07 ENCOUNTER — OFFICE VISIT (OUTPATIENT)
Dept: GASTROENTEROLOGY | Facility: CLINIC | Age: 64
End: 2024-02-07
Payer: MEDICARE

## 2024-02-07 ENCOUNTER — LAB (OUTPATIENT)
Dept: LAB | Facility: HOSPITAL | Age: 64
End: 2024-02-07
Payer: MEDICARE

## 2024-02-07 VITALS
HEART RATE: 73 BPM | WEIGHT: 131 LBS | SYSTOLIC BLOOD PRESSURE: 111 MMHG | DIASTOLIC BLOOD PRESSURE: 83 MMHG | OXYGEN SATURATION: 100 % | BODY MASS INDEX: 23.21 KG/M2 | HEIGHT: 63 IN

## 2024-02-07 DIAGNOSIS — Z86.010 HISTORY OF COLON POLYPS: ICD-10-CM

## 2024-02-07 DIAGNOSIS — K31.A0 GASTRIC INTESTINAL METAPLASIA: ICD-10-CM

## 2024-02-07 DIAGNOSIS — K22.70 BARRETT'S ESOPHAGUS WITHOUT DYSPLASIA: ICD-10-CM

## 2024-02-07 DIAGNOSIS — K21.9 GASTROESOPHAGEAL REFLUX DISEASE WITHOUT ESOPHAGITIS: ICD-10-CM

## 2024-02-07 DIAGNOSIS — K76.0 FATTY LIVER: ICD-10-CM

## 2024-02-07 DIAGNOSIS — K76.0 FATTY LIVER: Primary | ICD-10-CM

## 2024-02-07 DIAGNOSIS — R16.0 HEPATOMEGALY: ICD-10-CM

## 2024-02-07 LAB
BASOPHILS # BLD AUTO: 0.03 10*3/MM3 (ref 0–0.2)
BASOPHILS NFR BLD AUTO: 0.5 % (ref 0–1.5)
DEPRECATED RDW RBC AUTO: 39.4 FL (ref 37–54)
EOSINOPHIL # BLD AUTO: 0.14 10*3/MM3 (ref 0–0.4)
EOSINOPHIL NFR BLD AUTO: 2.5 % (ref 0.3–6.2)
ERYTHROCYTE [DISTWIDTH] IN BLOOD BY AUTOMATED COUNT: 12.1 % (ref 12.3–15.4)
HCT VFR BLD AUTO: 39.6 % (ref 34–46.6)
HGB BLD-MCNC: 13 G/DL (ref 12–15.9)
IMM GRANULOCYTES # BLD AUTO: 0.01 10*3/MM3 (ref 0–0.05)
IMM GRANULOCYTES NFR BLD AUTO: 0.2 % (ref 0–0.5)
LYMPHOCYTES # BLD AUTO: 1.29 10*3/MM3 (ref 0.7–3.1)
LYMPHOCYTES NFR BLD AUTO: 23 % (ref 19.6–45.3)
MCH RBC QN AUTO: 29.4 PG (ref 26.6–33)
MCHC RBC AUTO-ENTMCNC: 32.8 G/DL (ref 31.5–35.7)
MCV RBC AUTO: 89.6 FL (ref 79–97)
MONOCYTES # BLD AUTO: 0.5 10*3/MM3 (ref 0.1–0.9)
MONOCYTES NFR BLD AUTO: 8.9 % (ref 5–12)
NEUTROPHILS NFR BLD AUTO: 3.63 10*3/MM3 (ref 1.7–7)
NEUTROPHILS NFR BLD AUTO: 64.9 % (ref 42.7–76)
NRBC BLD AUTO-RTO: 0 /100 WBC (ref 0–0.2)
PLATELET # BLD AUTO: 185 10*3/MM3 (ref 140–450)
PMV BLD AUTO: 11.8 FL (ref 6–12)
RBC # BLD AUTO: 4.42 10*6/MM3 (ref 3.77–5.28)
WBC NRBC COR # BLD AUTO: 5.6 10*3/MM3 (ref 3.4–10.8)

## 2024-02-07 PROCEDURE — 85025 COMPLETE CBC W/AUTO DIFF WBC: CPT

## 2024-02-07 PROCEDURE — 1159F MED LIST DOCD IN RCRD: CPT

## 2024-02-07 PROCEDURE — 99214 OFFICE O/P EST MOD 30 MIN: CPT

## 2024-02-07 PROCEDURE — 1160F RVW MEDS BY RX/DR IN RCRD: CPT

## 2024-02-07 PROCEDURE — 36415 COLL VENOUS BLD VENIPUNCTURE: CPT

## 2024-02-07 NOTE — PROGRESS NOTES
Chief Complaint   Fatty liver and Bloated    Panda Rinaldi is a 63 y.o. female who presents to [facility] for follow up.    History of present Illness  Patient presents to the office for follow up  for hepatomegaly, elevated liver enzymes, Parker's esophagus, gastric intestinal metaplasia, and history of colon polyps.  Patient continues omeprazole 40 mg daily.  Denies breakthrough heartburn, nausea, vomiting, epigastric pain, and dysphagia.  Recently struggling with more bloating since changing cholesterol medication.  Denies lower GI symptoms such as change in bowel habits, constipation, diarrhea, melena, and hematochezia.    EGD/Colonoscopy 12/21/2023 by Dr. Story - normal esophagus, stomach, and duodenum.  Esophageal biopsies-intestinal metaplasia.  Stomach biopsies-intestinal metaplasia.  Duodenum biopsies-mild chronic duodenitis.  2 tubular adenoma polyps in the transverse colon otherwise normal mucosa throughout. Repeat EGD in 3 years.  Repeat colonoscopy 5 years.    Liver work up 10/04/2022 - normal              ASMA, AMA, CHANDRIKA, immunofixation - negative              Ceruloplasmin 25, Copper 105              Alpha 1 antitrypsin 124              Iron 118, iron saturation 33, TIBC 358, ferritin 143              Acute hep panel - negative     US liver 07/08/2022 -enlarged liver with increased echogenicity likely steatosis    Past Medical History:   Diagnosis Date    Abnormal bone density screening 10/15/2014    osteoporosis- tried fosamax in the past    Acid reflux     Allergic 10year    Arthritis 07/15/2021    left hip pain. risk for falls.     Colon polyp 2021    Coronary artery disease 2018    Fatty liver 2022    Headache 06/01/1993    Heart disease     Hyperlipemia     Osteopenia     Osteoporosis 10/15/2014    Palpitations     Seasonal allergies     Shoulder pain        Past Surgical History:   Procedure Laterality Date    COLONOSCOPY  10/2015    ameena sales      COLONOSCOPY N/A 07/15/2021    Procedure: COLONOSCOPY with polypectomy/snare;  Surgeon: Adelfo Story MD;  Location: [facility] ENDOSCOPY;  Service: General;  Laterality: N/A;  colon polyps    COLONOSCOPY N/A 12/21/2023    Procedure: COLONOSCOPY with cold snare;  Surgeon: Adelfo Story MD;  Location: [facility] ENDOSCOPY;  Service: General;  Laterality: N/A;  colon polyp    ENDOSCOPY N/A 07/15/2021    Procedure: ESOPHAGOGASTRODUODENOSCOPY with biopsies;  Surgeon: Adelfo Story MD;  Location: [facility] ENDOSCOPY;  Service: General;  Laterality: N/A;  gastric nodule    ENDOSCOPY N/A 12/21/2023    Procedure: ESOPHAGOGASTRODUODENOSCOPY with biopsies;  Surgeon: Adelfo Story MD;  Location: [facility] ENDOSCOPY;  Service: General;  Laterality: N/A;  normal    SHOULDER SURGERY Right 2014    [facility]     UPPER GASTROINTESTINAL ENDOSCOPY           Current Outpatient Medications:     Acetaminophen 325 MG capsule, , Disp: , Rfl:     cetirizine (zyrTEC) 10 MG tablet, Take 1 tablet by mouth Daily., Disp: 90 tablet, Rfl: 1    cholecalciferol (VITAMIN D3) 1.25 MG (34135 UT) capsule, 1 capsule., Disp: , Rfl:     Diclofenac Sodium (VOLTAREN) 1 % gel gel, Apply 4 g topically to the appropriate area as directed 2 (Two) Times a Day., Disp: 2 g, Rfl: 2    folic acid (FOLVITE) 1 MG tablet, Take 1 tablet by mouth Daily., Disp: , Rfl:     Inositol Niacinate (NIACIN FLUSH FREE PO), Take  by mouth., Disp: , Rfl:     lidocaine (LIDODERM) 5 %, Place 1 patch on the skin as directed by provider Daily., Disp: , Rfl:     metoprolol succinate XL (TOPROL-XL) 25 MG 24 hr tablet, Take 1 tablet by mouth Daily., Disp: 90 tablet, Rfl: 1    omeprazole (priLOSEC) 40 MG capsule, Take 1 capsule by mouth Daily for 90 days., Disp: 90 capsule, Rfl: 1    Rimegepant Sulfate (Nurtec) 75 MG tablet dispersible tablet, Take 1 tablet by mouth 1 (One) Time As Needed (headache) for up to 1 dose., Disp: 15 tablet, Rfl: 2    rosuvastatin (Crestor) 5 MG tablet, Take 1  "tablet by mouth Daily for 90 days., Disp: 90 tablet, Rfl: 1    Turmeric (QC Tumeric Complex) 500 MG capsule, Take 1 capsule by mouth Daily., Disp: , Rfl:     Unable to find, Take 1 each by mouth 1 (One) Time. Med Name: GLUTATHRONE 500MG, Disp: , Rfl:     ezetimibe (Zetia) 10 MG tablet, Take 1 tablet by mouth Daily., Disp: 90 tablet, Rfl: 1  No current facility-administered medications for this visit.    Facility-Administered Medications Ordered in Other Visits:     denosumab (PROLIA) syringe 60 mg, 60 mg, Subcutaneous, Once, Lorri White MD     Allergies   Allergen Reactions    Hydrocodone-Acetaminophen Shortness Of Breath    Tramadol Shortness Of Breath    Codeine Unknown - High Severity    Azithromycin Unknown - Low Severity    Kenalog [Triamcinolone Acetonide] Rash    Latex Unknown - Low Severity       Family History   Problem Relation Age of Onset    Heart attack Mother     Colon cancer Mother 65    Arthritis Mother     Heart disease Father     Breast cancer Sister     Stroke Other     Breast cancer Other     Malig Hyperthermia Neg Hx         Social History     Social History Narrative    Lives with spouse.        Objective       Vital Signs:   /83 (BP Location: Right arm, Patient Position: Sitting, Cuff Size: Adult)   Pulse 73   Ht 160 cm (62.99\")   Wt 59.4 kg (131 lb)   SpO2 100%   BMI 23.21 kg/m²       Physical Exam  Constitutional:       Appearance: Normal appearance. She is normal weight.   HENT:      Head: Normocephalic and atraumatic.      Nose: Nose normal.   Pulmonary:      Effort: Pulmonary effort is normal.   Skin:     General: Skin is warm and dry.   Neurological:      Mental Status: She is alert and oriented to person, place, and time. Mental status is at baseline.   Psychiatric:         Mood and Affect: Mood normal.         Behavior: Behavior normal.         Thought Content: Thought content normal.         Judgment: Judgment normal.         Result Review :         CMP          " 11/20/2023    14:35 12/12/2023    17:20 1/26/2024    13:55   CMP   Glucose  142  107    BUN  10  12    Creatinine 0.60  0.69  0.70    EGFR 101.0  97.7  97.3    Sodium  139  140    Potassium  3.7  5.4    Chloride  102  105    Calcium  9.4  10.2    Total Protein  7.2  8.2    Albumin  4.7  4.7    Globulin  2.5  3.5    Total Bilirubin  0.2  0.5    Alkaline Phosphatase  49  49    AST (SGOT)  17  23    ALT (SGPT)  15  21    Albumin/Globulin Ratio  1.9  1.3    BUN/Creatinine Ratio  14.5  17.1    Anion Gap  11.0  9.0      Liver Workup   ALPHA -1 ANTITRYPSIN   Date Value Ref Range Status   10/04/2022 124 90 - 200 mg/dL Final     dsDNA   Date Value Ref Range Status   10/04/2022 Negative Negative Final     Expanded AMANDA Screen   Date Value Ref Range Status   10/04/2022 Negative Negative Final     Smooth Muscle Ab   Date Value Ref Range Status   10/04/2022 9 0 - 19 Units Final     Comment:                      Negative                     0 - 19                   Weak positive               20 - 30                   Moderate to strong positive     >30   Actin Antibodies are found in 52-85% of patients with   autoimmune hepatitis or chronic active hepatitis and   in 22% of patients with primary biliary cirrhosis.     Ceruloplasmin   Date Value Ref Range Status   10/04/2022 25 19 - 39 mg/dL Final     Ferritin   Date Value Ref Range Status   10/04/2022 143.00 13.00 - 150.00 ng/mL Final     Immunofixation Result, Serum   Date Value Ref Range Status   10/04/2022 Comment  Final     Comment:     No monoclonality detected.     IgG   Date Value Ref Range Status   10/04/2022 1351 586 - 1602 mg/dL Final     IgA   Date Value Ref Range Status   10/04/2022 239 87 - 352 mg/dL Final     IgM   Date Value Ref Range Status   10/04/2022 173 26 - 217 mg/dL Final     Iron   Date Value Ref Range Status   10/04/2022 118 37 - 145 mcg/dL Final     TIBC   Date Value Ref Range Status   10/04/2022 358 298 - 536 mcg/dL Final     Iron Saturation (TSAT)   Date  Value Ref Range Status   10/04/2022 33 20 - 50 % Final     Transferrin   Date Value Ref Range Status   10/04/2022 240 200 - 360 mg/dL Final     Mitochondrial Ab   Date Value Ref Range Status   10/04/2022 <20.0 0.0 - 20.0 Units Final     Comment:                                     Negative    0.0 - 20.0                                  Equivocal  20.1 - 24.9                                  Positive         >24.9  Mitochondrial (M2) Antibodies are found in 90-96% of  patients with primary biliary cirrhosis.     Protime   Date Value Ref Range Status   10/04/2022 12.6 11.8 - 14.9 Seconds Final     INR   Date Value Ref Range Status   10/04/2022 0.93 0.86 - 1.15 Final     ALPHA-FETOPROTEIN   Date Value Ref Range Status   10/04/2022 2.03 0 - 8.3 ng/mL Final           Assessment and Plan    Diagnoses and all orders for this visit:    1. Fatty liver (Primary)  -     US Abdomen Limited; Future  -     CBC & Differential; Future    2. Hepatomegaly    3. Gastroesophageal reflux disease without esophagitis    4. Parker's esophagus without dysplasia    5. Gastric intestinal metaplasia    6. History of colon polyps    63-year-old patient presents to the office for follow up  for hepatomegaly, elevated liver enzymes, Parker's esophagus, gastric intestinal metaplasia, and history of colon polyps.  Patient will continue omeprazole 40 mg daily.  She is in 3-year EGD recall and 5-year colonoscopy recall with Dr. Story.  Most recent CMP shows normal liver enzymes.  Patient will proceed with repeat ultrasound and CBC.  Pending these findings will determine the need for FibroScan or any further follow-ups.  Overall patient is doing very well and will call the office any questions or concerns.    Follow Up   No follow-ups on file.  Patient was given instructions and counseling regarding her condition or for health maintenance advice. Please see specific information pulled into the AVS if appropriate.

## 2024-02-08 ENCOUNTER — TELEPHONE (OUTPATIENT)
Dept: URGENT CARE | Facility: CLINIC | Age: 64
End: 2024-02-08

## 2024-02-08 DIAGNOSIS — B34.9 VIRAL ILLNESS: Primary | ICD-10-CM

## 2024-02-08 DIAGNOSIS — J10.1 INFLUENZA A: ICD-10-CM

## 2024-02-08 PROCEDURE — 87635 SARS-COV-2 COVID-19 AMP PRB: CPT | Performed by: NURSE PRACTITIONER

## 2024-02-08 RX ORDER — OSELTAMIVIR PHOSPHATE 75 MG/1
75 CAPSULE ORAL 2 TIMES DAILY
Qty: 10 CAPSULE | Refills: 0 | Status: SHIPPED | OUTPATIENT
Start: 2024-02-08 | End: 2024-02-13

## 2024-02-08 RX ORDER — DEXTROMETHORPHAN HYDROBROMIDE AND PROMETHAZINE HYDROCHLORIDE 15; 6.25 MG/5ML; MG/5ML
5 SYRUP ORAL 4 TIMES DAILY PRN
Qty: 120 ML | Refills: 0 | Status: SHIPPED | OUTPATIENT
Start: 2024-02-08

## 2024-02-09 NOTE — TELEPHONE ENCOUNTER
Called patient to review COVID PCR result which was negative.  The patient would like to go ahead and be treated with Tamiflu for influenza.  She would also like a prescription for a cough medicine.  I have sent both of those prescriptions into her selected pharmacy.  The patient should quarantine as directed, treat her symptoms and follow-up as needed or if symptoms worsen or persist.  She verbalizes understanding.

## 2024-03-04 ENCOUNTER — OFFICE VISIT (OUTPATIENT)
Dept: OTOLARYNGOLOGY | Facility: CLINIC | Age: 64
End: 2024-03-04
Payer: MEDICARE

## 2024-03-04 VITALS
TEMPERATURE: 97.5 F | DIASTOLIC BLOOD PRESSURE: 76 MMHG | SYSTOLIC BLOOD PRESSURE: 113 MMHG | HEIGHT: 63 IN | HEART RATE: 67 BPM | BODY MASS INDEX: 23.21 KG/M2

## 2024-03-04 DIAGNOSIS — J38.7 VALLECULAR MASS: ICD-10-CM

## 2024-03-04 DIAGNOSIS — J39.2 THORNWALDT'S CYST: ICD-10-CM

## 2024-03-04 DIAGNOSIS — Z87.891 HISTORY OF TOBACCO USE: ICD-10-CM

## 2024-03-04 DIAGNOSIS — R13.12 OROPHARYNGEAL DYSPHAGIA: Primary | ICD-10-CM

## 2024-03-04 DIAGNOSIS — K21.00 GASTROESOPHAGEAL REFLUX DISEASE WITH ESOPHAGITIS WITHOUT HEMORRHAGE: ICD-10-CM

## 2024-03-04 PROCEDURE — 31575 DIAGNOSTIC LARYNGOSCOPY: CPT | Performed by: OTOLARYNGOLOGY

## 2024-03-04 PROCEDURE — 99204 OFFICE O/P NEW MOD 45 MIN: CPT | Performed by: OTOLARYNGOLOGY

## 2024-03-04 PROCEDURE — 1159F MED LIST DOCD IN RCRD: CPT | Performed by: OTOLARYNGOLOGY

## 2024-03-04 PROCEDURE — 1160F RVW MEDS BY RX/DR IN RCRD: CPT | Performed by: OTOLARYNGOLOGY

## 2024-03-04 RX ORDER — OSELTAMIVIR PHOSPHATE 75 MG/1
CAPSULE ORAL
COMMUNITY
Start: 2024-02-09 | End: 2025-02-07

## 2024-03-04 NOTE — PROGRESS NOTES
Patient Name: Panda Rinaldi   Visit Date: 03/04/2024   Patient ID: [id number]  Provider: Smith Cochran MD    Sex: female  Location: [facility]   YOB: 1960  Location Address: [address] [place] [address], [address] [place] [address],?[place]?[id number]    Primary Care Provider Lorri White MD  Location Phone: [phone]    Referring Provider: Lorri White MD        Chief Complaint  Difficulty Swallowing    History of Present Illness  Panda Rinaldi is a 63 y.o. female who presents to [facility] for Difficulty Swallowing.  Patient had difficulty with swallowing for about a year.  Patient also had recently EGD and colonoscopy done by Dr. Story about 2 months ago.  Biopsy of the GE junction showed intestinal metaplasia.  Otherwise rest of biopsies were unremarkable other than mild inflammation.  She had a CT scan of the neck and head which was unremarkable except for questionable soft tissue density in the vallecula.  Therefore ENT was requested for further examination and management.  Patient has been diagnosed also with GERD with esophagitis and has been treated with Prilosec.    Past Medical History:   Diagnosis Date    Abnormal bone density screening 10/15/2014    osteoporosis- tried fosamax in the past    Acid reflux     Allergic 10year    Arthritis 07/15/2021    left hip pain. risk for falls.     Colon polyp 2021    Coronary artery disease 2018    Fatty liver 2022    Headache 06/01/1993    Heart disease     Hyperlipemia     Osteopenia     Osteoporosis 10/15/2014    Palpitations     Seasonal allergies     Shoulder pain        Past Surgical History:   Procedure Laterality Date    COLONOSCOPY  10/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     COLONOSCOPY N/A 07/15/2021    Procedure: COLONOSCOPY with polypectomy/snare;  Surgeon: Adelfo Story MD;  Location: [facility] END[facility];  Service: General;  Laterality: N/A;  colon polyps    COLONOSCOPY N/A 12/21/2023    Procedure: COLONOSCOPY with  cold snare;  Surgeon: Adelfo Story MD;  Location: [facility] ENDOSCOPY;  Service: General;  Laterality: N/A;  colon polyp    ENDOSCOPY N/A 07/15/2021    Procedure: ESOPHAGOGASTRODUODENOSCOPY with biopsies;  Surgeon: Adelfo Story MD;  Location: [facility] ENDOSCOPY;  Service: General;  Laterality: N/A;  gastric nodule    ENDOSCOPY N/A 12/21/2023    Procedure: ESOPHAGOGASTRODUODENOSCOPY with biopsies;  Surgeon: Adelfo Story MD;  Location: [facility] ENDOSCOPY;  Service: General;  Laterality: N/A;  normal    SHOULDER SURGERY Right 2014    [facility]     UPPER GASTROINTESTINAL ENDOSCOPY           Current Outpatient Medications:     Acetaminophen 325 MG capsule, , Disp: , Rfl:     cetirizine (zyrTEC) 10 MG tablet, Take 1 tablet by mouth Daily., Disp: 90 tablet, Rfl: 1    cholecalciferol (VITAMIN D3) 1.25 MG (92575 UT) capsule, 1 capsule., Disp: , Rfl:     Diclofenac Sodium (VOLTAREN) 1 % gel gel, Apply 4 g topically to the appropriate area as directed 2 (Two) Times a Day., Disp: 2 g, Rfl: 2    folic acid (FOLVITE) 1 MG tablet, Take 1 tablet by mouth Daily., Disp: , Rfl:     Inositol Niacinate (NIACIN FLUSH FREE PO), Take  by mouth., Disp: , Rfl:     lidocaine (LIDODERM) 5 %, Place 1 patch on the skin as directed by provider Daily., Disp: , Rfl:     metoprolol succinate XL (TOPROL-XL) 25 MG 24 hr tablet, Take 1 tablet by mouth Daily., Disp: 90 tablet, Rfl: 1    omeprazole (priLOSEC) 40 MG capsule, Take 1 capsule by mouth Daily for 90 days., Disp: 90 capsule, Rfl: 1    oseltamivir (TAMIFLU) 75 MG capsule, Finish the prescription.Check with your doctor before becoming pregnant., Disp: , Rfl:     promethazine-dextromethorphan (PROMETHAZINE-DM) 6.25-15 MG/5ML syrup, Take 5 mL by mouth 4 (Four) Times a Day As Needed for Cough., Disp: 120 mL, Rfl: 0    Rimegepant Sulfate (Nurtec) 75 MG tablet dispersible tablet, Take 1 tablet by mouth 1 (One) Time As Needed (headache) for up to 1 dose., Disp: 15 tablet, Rfl: 2    rosuvastatin  "(Crestor) 5 MG tablet, Take 1 tablet by mouth Daily for 90 days., Disp: 90 tablet, Rfl: 1    Turmeric (QC Tumeric Complex) 500 MG capsule, Take 1 capsule by mouth Daily., Disp: , Rfl:     Unable to find, Take 1 each by mouth 1 (One) Time. Med Name: GLUTATHRONE 500MG, Disp: , Rfl:   No current facility-administered medications for this visit.    Facility-Administered Medications Ordered in Other Visits:     denosumab (PROLIA) syringe 60 mg, 60 mg, Subcutaneous, Once, Lorri White MD     Allergies   Allergen Reactions    Hydrocodone-Acetaminophen Shortness Of Breath    Tramadol Shortness Of Breath    Codeine Unknown - High Severity    Azithromycin Unknown - Low Severity    Kenalog [Triamcinolone Acetonide] Rash    Latex Unknown - Low Severity       Social History     Tobacco Use    Smoking status: Former     Current packs/day: 0.00     Types: Cigarettes     Start date: 1980     Quit date: 1985     Years since quittin.2     Passive exposure: Never    Smokeless tobacco: Never   Vaping Use    Vaping status: Never Used   Substance Use Topics    Alcohol use: Yes     Comment: sometimes we have wine on holidays, usually 1-2 glasses    Drug use: Never        Objective     Vital Signs:   Vitals:    24 1254   BP: 113/76   BP Location: Left arm   Patient Position: Sitting   Cuff Size: Adult   Pulse: 67   Temp: 97.5 °F (36.4 °C)   TempSrc: Temporal   Height: 160 cm (62.99\")       Tobacco Use: Medium Risk (3/4/2024)    Patient History     Smoking Tobacco Use: Former     Smokeless Tobacco Use: Never     Passive Exposure: Never         Physical Exam    Constitutional   Appearance  well developed, well-nourished, alert and in no acute distress, voice clear and strong    Head   Inspection  no deformities or lesions      Face   Inspection  no facial lesions; House-Brackmann I/VI bilaterally   Palpation  no TMJ crepitus nor  muscle tenderness bilaterally     Eyes/Vision   Visual Fields  extraocular " movements are intact, no spontaneous or gaze-induced nystagmus  Conjunctivae  clear   Sclerae  clear   Pupils and Irises  pupils equal, round, and reactive to light.   Nystagmus  not present     Ears, Nose, Mouth and Throat  Ears  External Ears  appearance within normal limits, no lesions present   Otoscopic Examination  tympanic membrane appearance within normal limits bilaterally without perforations, well-aerated middle ears   Hearing  intact to conversational voice both ears   Tunning fork testing    Rinne:  Bentley:    Nose  External Nose  appearance normal   Intranasal Exam  mucosa within normal limits, vestibules normal, no intranasal lesions present, septum midline, sinuses non tender to percussion   Modified Joyce Test:    Oral Cavity  Oral Mucosa  oral mucosa normal without pallor or cyanosis   Lips  lip appearance normal   Teeth  normal dentition for age   Gums  gums pink, non-swollen, no bleeding present   Tongue  tongue appearance normal; normal mobility   Palate  hard palate normal, soft palate appearance normal with symmetric mobility     Throat  Oropharynx  no inflammation or lesions present, tonsils within normal limits   Hypopharynx  appearance within normal limits   Larynx  voice normal     Neck  Inspection/Palpation  normal appearance, no masses or tenderness, trachea midline; thyroid size normal, nontender, no nodules or masses present on palpation     Lymphatic  Neck  no lymphadenopathy present   Supraclavicular Nodes  no lymphadenopathy present   Preauricular Nodes  no lymphadenopathy present     Respiratory  Respiratory Effort  breathing unlabored   Inspection of Chest  normal appearance, no retractions     Musculoskeletal   Cervical back: Normal range of motion and neck supple.      Skin and Subcutaneous Tissue  General Inspection  Regarding face and neck - there are no rashes present, no lesions present, and no areas of discoloration     Neurologic  Cranial Nerves  cranial nerves II-XII are  grossly intact bilaterally   Gait and Station  normal gait, able to stand without diffculty    Psychiatric  Judgement and Insight  judgment and insight intact   Mood and Affect  mood normal, affect appropriate       RESULTS REVIEWED    I have reviewed the following information:   [x]  Previous Internal Note  []  Previous External Note:   [x]  Ordered Tests & Results:      Pathology:   Lab Results   Component Value Date    CASEREPORT  12/21/2023     Surgical Pathology Report                         Case: [id number]                                  Authorizing Provider:  Adelfo Story MD      Collected:           12/21/2023 01:02 PM          Ordering Location:     [facility] Received:            12/22/2023 05:53 AM                                 SUITES                                                                       Pathologist:           Daisy Bess DO                                                       Specimens:   1) - Large Intestine, Transverse Colon, distal transverse colon polyp cold snare                    2) - Large Intestine, Transverse Colon, proximal transverse colon polyp cold sare                   3) - Small Intestine, duodenum biopsy                                                               4) - Gastric, Antrum, gastric antrum biopsy                                                         5) - Gastric, Body, gastric body biopsy                                                             6) - GE Junction, ge junction biopsy                                                                7) - Esophagus, Mid, mid esophagus biopsy                                                  CLININFO  12/21/2023     Gastroesophageal reflux disease with esophagitis without hemorrhage  Oropharyngeal dysphagia  Intestinal metaplasia of gastric mucosa  Screening for malignant neoplasm of colon  History of colonic polyps      FINALDX  12/21/2023     1.  Distal transverse colon polyp,  "biopsy:   - Tubular adenoma      2.  Proximal transverse colon polyp, biopsy:   - Tubular adenoma      3. Duodenum, biopsy:   - Mild chronic duodenitis    4.  Stomach, antrum, biopsy:   - Gastric antrum mucosa with mild chronic inactive gastritis and intestinal metaplasia   - Negative for Helicobacter pylori on routine H&E stain   - Negative for dysplasia and malignancy      5. Stomach, body, biopsy:   - Gastric body/fundus mucosa with no significant pathologic change   - Negative for Helicobacter pylori on routine H&E stain   - Negative for intestinal metaplasia, dysplasia and malignancy      6. Gastroesophageal junction, biopsy:   - Squamocolumnar mucosa with intestinal metaplasia   - Negative for dysplasia and malignancy      7.Mid esophagus, biopsy:   - Squamous mucosa with no significant pathologic change   - Negative for eosinophilic esophagitis        GROSSDES  12/21/2023     1. Large Intestine, Transverse Colon.  Received in formalin and labeled \" distal transverse colon polyp cold snare\" is a 0.3 cm fragment of tan soft tissue. The specimen is entirely submitted in one cassette.    2. Large Intestine, Transverse Colon.  Received in formalin and labeled \" proximal transverse colon polyp cold snare\" is a 0.3 cm fragment of tan soft tissue. The specimen is entirely submitted in one cassette.    3. Small Intestine.  Received in formalin and labeled \" duodenum\" are two fragments of tan soft tissue measuring 0.3-0.4 cm in greatest dimension. The specimen is entirely submitted in one cassette.    4. Gastric, Antrum.  Received in formalin and labeled \" gastric antrum\" is a 0.7 cm aggregate of tan soft tissue fragments. The specimen is entirely submitted in one cassette.    5. Gastric, Body.  Received in formalin and labeled \" gastric body\" is a 0.7 cm aggregate of tan soft tissue fragments. The specimen is entirely submitted in one cassette.    6. GE Junction.  Received in formalin and labeled \" GE junction\" is a " "0.7 cm aggregate of tan soft tissue fragments. The specimen is entirely submitted in one cassette.    7. Esophagus, Mid.  Received in formalin and labeled \" midesophagus\" are two fragments of tan soft tissue measuring 0.2-0.3 cm in greatest dimension. The specimen is entirely submitted in one cassette.   MENDEZ      MICRO  12/21/2023     Microscopic examination performed.         TSH   Date Value Ref Range Status   01/19/2023 1.180 0.270 - 4.200 uIU/mL Final     Free T4   Date Value Ref Range Status   01/19/2023 1.23 0.93 - 1.70 ng/dL Final     Calcium   Date Value Ref Range Status   01/26/2024 10.2 8.6 - 10.5 mg/dL Final     25 Hydroxy, Vitamin D   Date Value Ref Range Status   01/26/2024 52.2 30.0 - 100.0 ng/ml Final       CT Soft Tissue Neck With Contrast    Result Date: 11/21/2023    1. Soft tissue density within the vallecula bilaterally suspected to represent secretions or ingested material.  The mucosal lesion is not definitively excluded.  Recommend correlation with direct visualization.     Vlad Connor M.D.       Electronically Signed and Approved By: Vlad Connor M.D. on 11/21/2023 at 13:47             CT Head With & Without Contrast    Result Date: 11/21/2023    1. No acute intracranial abnormality 2. Mild mucosal inflammatory changes in the left maxillary sinus.     Vlad Connor M.D.       Electronically Signed and Approved By: Vlad Connor M.D. on 11/21/2023 at 10:01               I have discussed the interpretation of the above results with the patient.    Laryngoscopy    Date/Time: 3/4/2024 1:32 PM    Performed by: Smith Cochran MD  Authorized by: Smith Cochran MD    Consent:     Consent obtained:  Verbal    Consent given by:  Patient    Risks discussed:  Bleeding and pain    Alternatives discussed:  No treatment and delayed treatment  Procedure details:     Medication:  Afrin    Instrument: flexible fiberoptic laryngoscope      Scope location: right nare    Post-procedure details:     Patient " tolerance of procedure:  Tolerated well  Comments:      After topical anesthetic application, flexible nasolaryngoscopy was performed through the right nostril.  Nasopharynx there is a less than 1 cm small cyst in the midline probably most likely consistent with Thornwaldt cyst.  Patient also has a normal examination at the base of tongue vallecula and epiglottis.  However the arytenoids are both quite erythematous indicative of reflux disease.  Otherwise both true vocal cords are unremarkable without any lesion and mobile.  There was good adduction and abduction present.            Assessment and Plan   Diagnoses and all orders for this visit:    1. Oropharyngeal dysphagia (Primary)  -     Flexible laryngoscopy    2. Vallecular mass  -     Flexible laryngoscopy    3. Gastroesophageal reflux disease with esophagitis without hemorrhage    4. History of tobacco use    5. Thornwaldt's cyst        Panda Rinaldi  reports that she quit smoking about 38 years ago. Her smoking use included cigarettes. She started smoking about 43 years ago. She has never been exposed to tobacco smoke. She has never used smokeless tobacco. I have educated her on the risk of diseases from using tobacco products such as Cancer, COPD & Heart Disease.       Plan:  Patient Instructions   1.  Patient has significant reflux disease and the reflux is coming up into the laryngeal area confirmed with flexible nasolaryngoscopy.  I would suggest that patient continue the Prilosec for now and also instructed on dietary changes to avoid anything that really causes lateral acid production in terms of type of foods and also drinks as well.  2.  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  CT scan of the neck showed some variation in the vallecular area which they thought it might be secretion blood on direct examination showed no evidence of any mass or lesion in the vallecula or the epiglottis or the base of  tongue.  4.  Since patient had EGD 2 months ago and there was some question as to whether she had Parker's esophagus  and if so, I would suggest strict dietary modifications.  Also consider esophageal endoscopy possibly 6 months to a year.     45 minutes were spent caring for Tae on this date of service. This time spent by me includes preparing for the visit, reviewing tests, obtaining/reviewing separately obtained history, performing medically appropriate exam/evaluation, counseling/educating the patient/family/caregiver, ordering medications/tests/procedures, referring/communicating with other health care professionals, documenting information in the medical record, independently interpreting results and communicating that with the patient/family/caregiver and/or care coordination.       Follow Up   Return in about 6 months (around 9/4/2024), or I will see patient in 6 months with repeat examination with flexible scope..  Patient was given instructions and counseling regarding her condition or for health maintenance advice. Please see specific information pulled into the AVS if appropriate.

## 2024-03-04 NOTE — PATIENT INSTRUCTIONS
1.  Patient has significant reflux disease and the reflux is coming up into the laryngeal area confirmed with flexible nasolaryngoscopy.  I would suggest that patient continue the Prilosec for now and also instructed on dietary changes to avoid anything that really causes lateral acid production in terms of type of foods and also drinks as well.  2.  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  CT scan of the neck showed some variation in the vallecular area which they thought it might be secretion blood on direct examination showed no evidence of any mass or lesion in the vallecula or the epiglottis or the base of tongue.  4.  Since patient had EGD 2 months ago and there was some question as to whether she had Parker's esophagus  and if so, I would suggest strict dietary modifications.  Also consider esophageal endoscopy possibly 6 months to a year.

## 2024-03-05 ENCOUNTER — PATIENT ROUNDING (BHMG ONLY) (OUTPATIENT)
Dept: OTOLARYNGOLOGY | Facility: CLINIC | Age: 64
End: 2024-03-05
Payer: MEDICARE

## 2024-03-05 NOTE — PROGRESS NOTES
A My-Chart message has been sent to the patient for PATIENT ROUNDING with [facility]

## 2024-03-12 ENCOUNTER — HOSPITAL ENCOUNTER (OUTPATIENT)
Dept: ULTRASOUND IMAGING | Facility: HOSPITAL | Age: 64
Discharge: HOME | End: 2024-03-12
Payer: MEDICARE

## 2024-03-12 DIAGNOSIS — K76.0 FATTY LIVER: ICD-10-CM

## 2024-03-26 ENCOUNTER — HOSPITAL ENCOUNTER (OUTPATIENT)
Dept: ULTRASOUND IMAGING | Facility: HOSPITAL | Age: 64
Discharge: HOME | End: 2024-03-26
Payer: MEDICARE

## 2024-03-26 PROCEDURE — 76705 ECHO EXAM OF ABDOMEN: CPT

## 2024-03-27 ENCOUNTER — TELEPHONE (OUTPATIENT)
Dept: GASTROENTEROLOGY | Facility: CLINIC | Age: 64
End: 2024-03-27
Payer: MEDICARE

## 2024-03-27 NOTE — TELEPHONE ENCOUNTER
Called pt left vm to call the office back left office call back number, will send pt [employer] message

## 2024-03-27 NOTE — TELEPHONE ENCOUNTER
----- Message from MADELYN Hodges sent at 3/27/2024  9:52 AM EDT -----  I have reviewed the patient's most recent ultrasound which shows normal liver with benign cysts in the liver.  Overall no acute findings.

## 2024-04-03 DIAGNOSIS — J30.2 SEASONAL ALLERGIC RHINITIS, UNSPECIFIED TRIGGER: ICD-10-CM

## 2024-04-04 RX ORDER — CETIRIZINE HYDROCHLORIDE 10 MG/1
10 TABLET ORAL DAILY
Qty: 90 TABLET | Refills: 1 | Status: SHIPPED | OUTPATIENT
Start: 2024-04-04

## 2024-04-29 ENCOUNTER — OFFICE VISIT (OUTPATIENT)
Dept: FAMILY MEDICINE CLINIC | Facility: CLINIC | Age: 64
End: 2024-04-29
Payer: MEDICARE

## 2024-04-29 VITALS
HEIGHT: 63 IN | SYSTOLIC BLOOD PRESSURE: 112 MMHG | BODY MASS INDEX: 22.38 KG/M2 | WEIGHT: 126.3 LBS | DIASTOLIC BLOOD PRESSURE: 66 MMHG | HEART RATE: 78 BPM | TEMPERATURE: 97.8 F | OXYGEN SATURATION: 99 %

## 2024-04-29 DIAGNOSIS — E78.2 MIXED HYPERLIPIDEMIA: ICD-10-CM

## 2024-04-29 DIAGNOSIS — R00.2 HEART PALPITATIONS: ICD-10-CM

## 2024-04-29 DIAGNOSIS — K21.00 GASTROESOPHAGEAL REFLUX DISEASE WITH ESOPHAGITIS WITHOUT HEMORRHAGE: Primary | ICD-10-CM

## 2024-04-29 DIAGNOSIS — Z12.31 BREAST CANCER SCREENING BY MAMMOGRAM: ICD-10-CM

## 2024-04-29 PROCEDURE — 99214 OFFICE O/P EST MOD 30 MIN: CPT | Performed by: FAMILY MEDICINE

## 2024-04-29 RX ORDER — CIMETIDINE 800 MG
800 TABLET ORAL NIGHTLY
Qty: 90 TABLET | Refills: 1 | Status: SHIPPED | OUTPATIENT
Start: 2024-04-29 | End: 2024-07-28

## 2024-04-29 RX ORDER — ERGOCALCIFEROL 1.25 MG/1
CAPSULE ORAL
COMMUNITY
Start: 2024-04-03

## 2024-04-29 RX ORDER — OMEPRAZOLE 40 MG/1
CAPSULE, DELAYED RELEASE ORAL
COMMUNITY
Start: 2024-01-11 | End: 2024-04-29

## 2024-04-29 NOTE — PROGRESS NOTES
Chief Complaint  Hypertension, Heartburn, Follow-up, and Dry Mouth    Subjective        Panda Rinaldi presents to [facility]  History of Present Illness  The patient presents to the office for follow-up of multiple medical concerns. She is accompanied by an adult male.    The patient underwent an upper endoscopy performed by Dr. Cochran in 12/2023, which revealed erythema in her throat, which she attributed to allergies. She experiences severe allergies each spring, characterized by a sensation of obstruction and erythema in her throat, to the extent that it impedes her ability to speak. Dr. Cochran attributed these symptoms to reflux and recommended a repeat upper endoscopy in 3 years. The patient was prescribed omeprazole during her last visit, which resulted in gastrointestinal upset and a sensation of hardness in her chest. She avoids spicy and soty foods.    The patient reports an exacerbation of her arthritis symptoms, particularly in the mornings. She also experiences xerostomia, particularly in the morning.    The patient is due for a mammogram. She is scheduled for a bone injection on 06/02/2024 and is inquiring about the necessity of this procedure. She underwent a bone density test in 12/2023.    The patient is inquiring about the duration of her cholesterol levels to be evaluated. She is currently on a regimen of Crestor 5 mg.    Supplemental Information  She used to see a cardiologist for the last 10 years, but he retired. She has an upcoming appointment with Dr. Dos Santos, cardiologist, on 05/10/2024. She has heart valve issues. She has tachycardia and palpitations. She has difficulty breathing, especially at night. She had a cardiac workup in mid 04/2023, which lasted for 10 years. Recently, she has noticed tachycardia.    Past Medical History:   Diagnosis Date    Abnormal bone density screening 10/15/2014    osteoporosis- tried fosamax in the past    Acid reflux     Allergic 10year  "   Arthritis 07/15/2021    left hip pain. risk for falls.     Colon polyp     Coronary artery disease 2018    Fatty liver     Headache 1993    Heart disease     Hyperlipemia     Osteopenia     Osteoporosis 10/15/2014    Palpitations     Seasonal allergies     Shoulder pain       Social History     Socioeconomic History    Marital status:    Tobacco Use    Smoking status: Former     Current packs/day: 0.00     Average packs/day: 0.3 packs/day for 5.4 years (1.3 ttl pk-yrs)     Types: Cigarettes     Start date: 1980     Quit date: 1985     Years since quittin.3     Passive exposure: Never    Smokeless tobacco: Never   Vaping Use    Vaping status: Never Used   Substance and Sexual Activity    Alcohol use: Yes     Comment: sometimes we have wine on holidays, usually 1-2 glasses    Drug use: Never    Sexual activity: Defer     Partners: Male     Comment: Not sexually active in years.      Family History   Problem Relation Age of Onset    Heart attack Mother     Colon cancer Mother 65    Arthritis Mother     Heart disease Father     Breast cancer Sister     Stroke Other     Breast cancer Other     Malig Hyperthermia Neg Hx         Objective   Vital Signs:  /66   Pulse 78   Temp 97.8 °F (36.6 °C)   Ht 160 cm (62.99\")   Wt 57.3 kg (126 lb 4.8 oz)   SpO2 99%   BMI 22.38 kg/m²   Estimated body mass index is 22.38 kg/m² as calculated from the following:    Height as of this encounter: 160 cm (62.99\").    Weight as of this encounter: 57.3 kg (126 lb 4.8 oz).     BMI is within normal parameters. No other follow-up for BMI required.    Review of Systems   Constitutional:  Negative for activity change, chills, fatigue and fever.   HENT:  Negative for congestion, sinus pressure, sinus pain and sore throat.    Eyes:  Negative for discharge and redness.   Respiratory:  Negative for cough and chest tightness.    Cardiovascular:  Negative for chest pain, palpitations and leg swelling. "   Gastrointestinal:  Negative for abdominal pain and diarrhea.   Endocrine: Negative for cold intolerance and heat intolerance.   Genitourinary:  Negative for difficulty urinating and dysuria.   Musculoskeletal:  Negative for gait problem and neck stiffness.   Skin:  Negative for pallor and rash.   Neurological:  Negative for dizziness and headaches.   Psychiatric/Behavioral:  Negative for agitation, behavioral problems and confusion.       Physical Exam  Vitals reviewed.   Constitutional:       Appearance: Normal appearance.   HENT:      Right Ear: Tympanic membrane normal.      Left Ear: Tympanic membrane normal.      Nose: Nose normal.   Eyes:      Extraocular Movements: Extraocular movements intact.      Conjunctiva/sclera: Conjunctivae normal.      Pupils: Pupils are equal, round, and reactive to light.   Cardiovascular:      Rate and Rhythm: Normal rate and regular rhythm.   Pulmonary:      Effort: Pulmonary effort is normal.      Breath sounds: Normal breath sounds.   Abdominal:      General: Bowel sounds are normal.   Musculoskeletal:         General: Normal range of motion.      Cervical back: Normal range of motion.   Skin:     General: Skin is warm and dry.   Neurological:      General: No focal deficit present.      Mental Status: She is alert and oriented to person, place, and time.   Psychiatric:         Mood and Affect: Mood normal.         Behavior: Behavior normal.        Physical Exam        Result Review       Results  Testing  EGD showed intestinal metaplasia.             Assessment and Plan     Diagnoses and all orders for this visit:    1. Gastroesophageal reflux disease with esophagitis without hemorrhage (Primary)  -     cimetidine (TAGAMET) 800 MG tablet; Take 1 tablet by mouth Every Night for 90 days.  Dispense: 90 tablet; Refill: 1    2. Breast cancer screening by mammogram  -     Mammo Screening Digital Tomosynthesis Bilateral With CAD; Future    3. Mixed hyperlipidemia  -     Lipid  Panel  -     Comprehensive Metabolic Panel    4. Heart palpitations      Assessment & Plan  1. Gastroesophageal Reflux Disease (GERD).  The patient was educated about the correlation between proton pump inhibitors and bone thinning associated with long-term use of omeprazole. The patient was advised to discontinue omeprazole. A prescription for cimetidine 800 mg, to be taken nightly, was provided.    2. Health maintenance.  The patient was advised to undergo a mammogram. A mammogram was ordered.    3. Cardiac valve issues.  The patient was educated about cardiac valve issues. The patient was advised to consult her cardiologist annually or biannually.    4. Hypercholesterolemia.  The patient was advised to continue her Crestor regimen. Fasting lab work was ordered.       I spent 35 minutes caring for Tae on this date of service. This time includes time spent by me in the following activities:reviewing tests  Follow Up     Return in about 3 months (around 7/29/2024).  Patient was given instructions and counseling regarding her condition or for health maintenance advice. Please see specific information pulled into the AVS if appropriate.   Patient or patient representative verbalized consent for the use of Ambient Listening during the visit with  Lorri White MD for chart documentation. 4/29/2024  20:19 EDT    Lorri White MD

## 2024-04-30 ENCOUNTER — CLINICAL SUPPORT (OUTPATIENT)
Dept: FAMILY MEDICINE CLINIC | Facility: CLINIC | Age: 64
End: 2024-04-30
Payer: MEDICARE

## 2024-04-30 DIAGNOSIS — I51.9 HEART DISEASE: ICD-10-CM

## 2024-04-30 DIAGNOSIS — E78.2 MIXED HYPERLIPIDEMIA: Primary | ICD-10-CM

## 2024-04-30 LAB
ALBUMIN SERPL-MCNC: 4.5 G/DL (ref 3.5–5.2)
ALBUMIN/GLOB SERPL: 1.8 G/DL
ALP SERPL-CCNC: 45 U/L (ref 39–117)
ALT SERPL W P-5'-P-CCNC: 22 U/L (ref 1–33)
ANION GAP SERPL CALCULATED.3IONS-SCNC: 10 MMOL/L (ref 5–15)
AST SERPL-CCNC: 19 U/L (ref 1–32)
BILIRUB SERPL-MCNC: 0.3 MG/DL (ref 0–1.2)
BUN SERPL-MCNC: 12 MG/DL (ref 8–23)
BUN/CREAT SERPL: 17.4 (ref 7–25)
CALCIUM SPEC-SCNC: 9.5 MG/DL (ref 8.6–10.5)
CHLORIDE SERPL-SCNC: 105 MMOL/L (ref 98–107)
CHOLEST SERPL-MCNC: 141 MG/DL (ref 0–200)
CO2 SERPL-SCNC: 26 MMOL/L (ref 22–29)
CREAT SERPL-MCNC: 0.69 MG/DL (ref 0.57–1)
EGFRCR SERPLBLD CKD-EPI 2021: 97.7 ML/MIN/1.73
GLOBULIN UR ELPH-MCNC: 2.5 GM/DL
GLUCOSE SERPL-MCNC: 100 MG/DL (ref 65–99)
HDLC SERPL-MCNC: 34 MG/DL (ref 40–60)
LDLC SERPL CALC-MCNC: 68 MG/DL (ref 0–100)
LDLC/HDLC SERPL: 1.76 {RATIO}
POTASSIUM SERPL-SCNC: 4.1 MMOL/L (ref 3.5–5.2)
PROT SERPL-MCNC: 7 G/DL (ref 6–8.5)
SODIUM SERPL-SCNC: 141 MMOL/L (ref 136–145)
TRIGL SERPL-MCNC: 236 MG/DL (ref 0–150)
VLDLC SERPL-MCNC: 39 MG/DL (ref 5–40)

## 2024-04-30 PROCEDURE — 80053 COMPREHEN METABOLIC PANEL: CPT | Performed by: FAMILY MEDICINE

## 2024-04-30 PROCEDURE — 36415 COLL VENOUS BLD VENIPUNCTURE: CPT | Performed by: FAMILY MEDICINE

## 2024-04-30 PROCEDURE — 80061 LIPID PANEL: CPT | Performed by: FAMILY MEDICINE

## 2024-05-06 ENCOUNTER — HOSPITAL ENCOUNTER (OUTPATIENT)
Dept: MAMMOGRAPHY | Facility: HOSPITAL | Age: 64
Discharge: HOME | End: 2024-05-06
Admitting: FAMILY MEDICINE
Payer: MEDICARE

## 2024-05-06 DIAGNOSIS — Z12.31 BREAST CANCER SCREENING BY MAMMOGRAM: ICD-10-CM

## 2024-05-06 PROCEDURE — 77067 SCR MAMMO BI INCL CAD: CPT

## 2024-05-06 PROCEDURE — 77063 BREAST TOMOSYNTHESIS BI: CPT

## 2024-05-10 ENCOUNTER — OFFICE VISIT (OUTPATIENT)
Dept: CARDIOLOGY | Facility: CLINIC | Age: 64
End: 2024-05-10
Payer: MEDICARE

## 2024-05-10 VITALS
HEIGHT: 63 IN | SYSTOLIC BLOOD PRESSURE: 112 MMHG | DIASTOLIC BLOOD PRESSURE: 76 MMHG | BODY MASS INDEX: 22.29 KG/M2 | HEART RATE: 76 BPM | WEIGHT: 125.8 LBS

## 2024-05-10 DIAGNOSIS — I49.3 PREMATURE VENTRICULAR CONTRACTIONS: ICD-10-CM

## 2024-05-10 DIAGNOSIS — E78.2 MIXED DYSLIPIDEMIA: ICD-10-CM

## 2024-05-10 DIAGNOSIS — I49.1 PREMATURE ATRIAL CONTRACTIONS: ICD-10-CM

## 2024-05-10 DIAGNOSIS — R07.89 CHEST DISCOMFORT: ICD-10-CM

## 2024-05-10 DIAGNOSIS — R00.2 PALPITATIONS: Primary | ICD-10-CM

## 2024-05-10 PROCEDURE — 99204 OFFICE O/P NEW MOD 45 MIN: CPT | Performed by: INTERNAL MEDICINE

## 2024-05-10 RX ORDER — OMEPRAZOLE 40 MG/1
40 CAPSULE, DELAYED RELEASE ORAL DAILY
COMMUNITY

## 2024-05-30 DIAGNOSIS — M81.0 AGE-RELATED OSTEOPOROSIS WITHOUT CURRENT PATHOLOGICAL FRACTURE: Primary | ICD-10-CM

## 2024-05-31 ENCOUNTER — PATIENT MESSAGE (OUTPATIENT)
Dept: FAMILY MEDICINE CLINIC | Facility: CLINIC | Age: 64
End: 2024-05-31
Payer: MEDICARE

## 2024-06-12 ENCOUNTER — HOSPITAL ENCOUNTER (OUTPATIENT)
Dept: INFUSION THERAPY | Facility: HOSPITAL | Age: 64
Discharge: HOME | End: 2024-06-12
Admitting: FAMILY MEDICINE
Payer: MEDICARE

## 2024-06-12 VITALS
WEIGHT: 125.88 LBS | HEIGHT: 64 IN | HEART RATE: 70 BPM | DIASTOLIC BLOOD PRESSURE: 75 MMHG | TEMPERATURE: 97.9 F | BODY MASS INDEX: 21.49 KG/M2 | RESPIRATION RATE: 18 BRPM | OXYGEN SATURATION: 99 % | SYSTOLIC BLOOD PRESSURE: 102 MMHG

## 2024-06-12 DIAGNOSIS — M81.0 AGE-RELATED OSTEOPOROSIS WITHOUT CURRENT PATHOLOGICAL FRACTURE: Primary | ICD-10-CM

## 2024-06-12 LAB
25(OH)D3 SERPL-MCNC: 60.3 NG/ML (ref 30–100)
ALBUMIN SERPL-MCNC: 4.5 G/DL (ref 3.5–5.2)
ALBUMIN/GLOB SERPL: 1.7 G/DL
ALP SERPL-CCNC: 43 U/L (ref 39–117)
ALT SERPL W P-5'-P-CCNC: 19 U/L (ref 1–33)
ANION GAP SERPL CALCULATED.3IONS-SCNC: 10.4 MMOL/L (ref 5–15)
AST SERPL-CCNC: 17 U/L (ref 1–32)
BILIRUB SERPL-MCNC: 0.4 MG/DL (ref 0–1.2)
BUN SERPL-MCNC: 10 MG/DL (ref 8–23)
BUN/CREAT SERPL: 14.9 (ref 7–25)
CALCIUM SPEC-SCNC: 9.3 MG/DL (ref 8.6–10.5)
CHLORIDE SERPL-SCNC: 103 MMOL/L (ref 98–107)
CO2 SERPL-SCNC: 26.6 MMOL/L (ref 22–29)
CREAT SERPL-MCNC: 0.67 MG/DL (ref 0.57–1)
EGFRCR SERPLBLD CKD-EPI 2021: 98.4 ML/MIN/1.73
GLOBULIN UR ELPH-MCNC: 2.6 GM/DL
GLUCOSE SERPL-MCNC: 118 MG/DL (ref 65–99)
MAGNESIUM SERPL-MCNC: 1.9 MG/DL (ref 1.6–2.4)
PHOSPHATE SERPL-MCNC: 4.2 MG/DL (ref 2.5–4.5)
POTASSIUM SERPL-SCNC: 4.1 MMOL/L (ref 3.5–5.2)
PROT SERPL-MCNC: 7.1 G/DL (ref 6–8.5)
SODIUM SERPL-SCNC: 140 MMOL/L (ref 136–145)

## 2024-06-12 PROCEDURE — 96372 THER/PROPH/DIAG INJ SC/IM: CPT

## 2024-06-12 PROCEDURE — 82306 VITAMIN D 25 HYDROXY: CPT | Performed by: FAMILY MEDICINE

## 2024-06-12 PROCEDURE — 36415 COLL VENOUS BLD VENIPUNCTURE: CPT

## 2024-06-12 PROCEDURE — 84100 ASSAY OF PHOSPHORUS: CPT | Performed by: FAMILY MEDICINE

## 2024-06-12 PROCEDURE — 25010000002 DENOSUMAB 60 MG/ML SOLUTION PREFILLED SYRINGE: Performed by: FAMILY MEDICINE

## 2024-06-12 PROCEDURE — 83735 ASSAY OF MAGNESIUM: CPT | Performed by: FAMILY MEDICINE

## 2024-06-12 PROCEDURE — 80053 COMPREHEN METABOLIC PANEL: CPT | Performed by: FAMILY MEDICINE

## 2024-06-12 RX ADMIN — DENOSUMAB 60 MG: 60 INJECTION SUBCUTANEOUS at 15:09

## 2024-06-20 ENCOUNTER — HOSPITAL ENCOUNTER (OUTPATIENT)
Dept: CARDIOLOGY | Facility: HOSPITAL | Age: 64
Discharge: HOME | End: 2024-06-20
Payer: MEDICARE

## 2024-06-20 VITALS — BODY MASS INDEX: 21 KG/M2 | WEIGHT: 123 LBS | HEIGHT: 64 IN

## 2024-06-20 DIAGNOSIS — R00.2 PALPITATIONS: ICD-10-CM

## 2024-06-20 DIAGNOSIS — R07.89 CHEST DISCOMFORT: ICD-10-CM

## 2024-06-20 LAB
AORTIC DIMENSIONLESS INDEX: 0.74 (DI)
ASCENDING AORTA: 2.9 CM
BH CV ECHO MEAS - ACS: 1.7 CM
BH CV ECHO MEAS - AO MAX PG: 3.8 MMHG
BH CV ECHO MEAS - AO MEAN PG: 2 MMHG
BH CV ECHO MEAS - AO ROOT DIAM: 3.1 CM
BH CV ECHO MEAS - AO V2 MAX: 98 CM/SEC
BH CV ECHO MEAS - AO V2 VTI: 26.2 CM
BH CV ECHO MEAS - AVA(I,D): 2.6 CM2
BH CV ECHO MEAS - EDV(CUBED): 75.2 ML
BH CV ECHO MEAS - EDV(MOD-SP2): 74.9 ML
BH CV ECHO MEAS - EDV(MOD-SP4): 80.1 ML
BH CV ECHO MEAS - EF(MOD-BP): 66.4 %
BH CV ECHO MEAS - EF(MOD-SP2): 68.2 %
BH CV ECHO MEAS - EF(MOD-SP4): 67.3 %
BH CV ECHO MEAS - ESV(CUBED): 18 ML
BH CV ECHO MEAS - ESV(MOD-SP2): 23.8 ML
BH CV ECHO MEAS - ESV(MOD-SP4): 26.2 ML
BH CV ECHO MEAS - FS: 37.9 %
BH CV ECHO MEAS - IVS/LVPW: 0.98 CM
BH CV ECHO MEAS - IVSD: 1.04 CM
BH CV ECHO MEAS - LA DIMENSION: 2.9 CM
BH CV ECHO MEAS - LAT PEAK E' VEL: 9 CM/SEC
BH CV ECHO MEAS - LV MASS(C)D: 148.1 GRAMS
BH CV ECHO MEAS - LV MAX PG: 3 MMHG
BH CV ECHO MEAS - LV MEAN PG: 1 MMHG
BH CV ECHO MEAS - LV V1 MAX: 87.1 CM/SEC
BH CV ECHO MEAS - LV V1 VTI: 19.3 CM
BH CV ECHO MEAS - LVIDD: 4.2 CM
BH CV ECHO MEAS - LVIDS: 2.6 CM
BH CV ECHO MEAS - LVOT AREA: 3.5 CM2
BH CV ECHO MEAS - LVOT DIAM: 2.1 CM
BH CV ECHO MEAS - LVPWD: 1.06 CM
BH CV ECHO MEAS - MED PEAK E' VEL: 8.7 CM/SEC
BH CV ECHO MEAS - MV A MAX VEL: 77.1 CM/SEC
BH CV ECHO MEAS - MV DEC SLOPE: 318 CM/SEC2
BH CV ECHO MEAS - MV DEC TIME: 0.28 SEC
BH CV ECHO MEAS - MV E MAX VEL: 80.2 CM/SEC
BH CV ECHO MEAS - MV E/A: 1.04
BH CV ECHO MEAS - MV MEAN PG: 1 MMHG
BH CV ECHO MEAS - MV P1/2T: 85.7 MSEC
BH CV ECHO MEAS - MV V2 VTI: 29.5 CM
BH CV ECHO MEAS - MVA(P1/2T): 2.6 CM2
BH CV ECHO MEAS - MVA(VTI): 2.27 CM2
BH CV ECHO MEAS - PA V2 MAX: 84 CM/SEC
BH CV ECHO MEAS - PULM A REVS DUR: 0.13 SEC
BH CV ECHO MEAS - PULM A REVS VEL: 34.9 CM/SEC
BH CV ECHO MEAS - PULM DIAS VEL: 37.6 CM/SEC
BH CV ECHO MEAS - PULM S/D: 1.32
BH CV ECHO MEAS - PULM SYS VEL: 49.5 CM/SEC
BH CV ECHO MEAS - QP/QS: 0.67
BH CV ECHO MEAS - RAP SYSTOLE: 5 MMHG
BH CV ECHO MEAS - RV MAX PG: 1.75 MMHG
BH CV ECHO MEAS - RV V1 MAX: 66.1 CM/SEC
BH CV ECHO MEAS - RV V1 VTI: 14.2 CM
BH CV ECHO MEAS - RVDD: 3.3 CM
BH CV ECHO MEAS - RVOT DIAM: 2 CM
BH CV ECHO MEAS - RVSP: 30 MMHG
BH CV ECHO MEAS - SV(LVOT): 66.8 ML
BH CV ECHO MEAS - SV(MOD-SP2): 51.1 ML
BH CV ECHO MEAS - SV(MOD-SP4): 53.9 ML
BH CV ECHO MEAS - SV(RVOT): 44.6 ML
BH CV ECHO MEAS - TAPSE (>1.6): 1.82 CM
BH CV ECHO MEAS - TR MAX PG: 25 MMHG
BH CV ECHO MEAS - TR MAX VEL: 250 CM/SEC
BH CV ECHO MEASUREMENTS AVERAGE E/E' RATIO: 9.06
BH CV IMMEDIATE POST RECOVERY TECH DATA SYMPTOMS: NORMAL
BH CV IMMEDIATE POST TECH DATA BLOOD PRESSURE: NORMAL MMHG
BH CV IMMEDIATE POST TECH DATA HEART RATE: 62 BPM
BH CV SIX MINUTE RECOVERY TECH DATA BLOOD PRESSURE: NORMAL
BH CV SIX MINUTE RECOVERY TECH DATA HEART RATE: 56 BPM
BH CV SIX MINUTE RECOVERY TECH DATA SYMPTOMS: NORMAL
BH CV STRESS BP STAGE 1: NORMAL
BH CV STRESS DURATION MIN STAGE 1: 3
BH CV STRESS DURATION MIN STAGE 2: 2
BH CV STRESS DURATION SEC STAGE 1: 0
BH CV STRESS DURATION SEC STAGE 2: 3
BH CV STRESS GRADE STAGE 1: 10
BH CV STRESS GRADE STAGE 2: 10
BH CV STRESS HR STAGE 1: 86
BH CV STRESS HR STAGE 2: 78
BH CV STRESS METS STAGE 1: 2.3
BH CV STRESS METS STAGE 2: 3.5
BH CV STRESS PROTOCOL 1: NORMAL
BH CV STRESS RECOVERY BP: NORMAL MMHG
BH CV STRESS RECOVERY HR: 56 BPM
BH CV STRESS SPEED STAGE 1: 1.7
BH CV STRESS SPEED STAGE 2: 2
BH CV STRESS STAGE 1: 1
BH CV STRESS STAGE 2: 2
BH CV THREE MINUTE POST TECH DATA BLOOD PRESSURE: NORMAL MMHG
BH CV THREE MINUTE POST TECH DATA HEART RATE: 61 BPM
BH CV XLRA - TDI S': 10.6 CM/SEC
IVRT: 77 MS
LEFT ATRIUM VOLUME INDEX: 17.9 ML/M2
MAXIMAL PREDICTED HEART RATE: 157 BPM
PERCENT MAX PREDICTED HR: 54.78 %
STRESS BASELINE BP: NORMAL MMHG
STRESS BASELINE HR: 58 BPM
STRESS PERCENT HR: 64 %
STRESS POST ESTIMATED WORKLOAD: 5.1 METS
STRESS POST EXERCISE DUR MIN: 5 MIN
STRESS POST EXERCISE DUR SEC: 3 SEC
STRESS POST PEAK BP: NORMAL MMHG
STRESS POST PEAK HR: 86 BPM
STRESS TARGET HR: 133 BPM

## 2024-06-20 PROCEDURE — 93306 TTE W/DOPPLER COMPLETE: CPT

## 2024-06-20 PROCEDURE — 93017 CV STRESS TEST TRACING ONLY: CPT

## 2024-07-09 ENCOUNTER — OFFICE VISIT (OUTPATIENT)
Dept: CARDIOLOGY | Facility: CLINIC | Age: 64
End: 2024-07-09
Payer: MEDICARE

## 2024-07-09 VITALS
SYSTOLIC BLOOD PRESSURE: 104 MMHG | WEIGHT: 130.8 LBS | BODY MASS INDEX: 22.33 KG/M2 | DIASTOLIC BLOOD PRESSURE: 70 MMHG | HEART RATE: 73 BPM | HEIGHT: 64 IN

## 2024-07-09 DIAGNOSIS — I49.3 PREMATURE VENTRICULAR CONTRACTIONS: Primary | ICD-10-CM

## 2024-07-09 DIAGNOSIS — I49.1 PREMATURE ATRIAL CONTRACTIONS: ICD-10-CM

## 2024-07-09 DIAGNOSIS — E78.2 MIXED DYSLIPIDEMIA: ICD-10-CM

## 2024-07-09 PROCEDURE — 1159F MED LIST DOCD IN RCRD: CPT

## 2024-07-09 PROCEDURE — 99214 OFFICE O/P EST MOD 30 MIN: CPT

## 2024-07-09 PROCEDURE — 1160F RVW MEDS BY RX/DR IN RCRD: CPT

## 2024-07-09 PROCEDURE — G2211 COMPLEX E/M VISIT ADD ON: HCPCS

## 2024-07-09 RX ORDER — METOPROLOL SUCCINATE 25 MG/1
25 TABLET, EXTENDED RELEASE ORAL 2 TIMES DAILY
Qty: 60 TABLET | Refills: 3 | Status: SHIPPED | OUTPATIENT
Start: 2024-07-09

## 2024-07-09 NOTE — PROGRESS NOTES
Chief Complaint  Hyperlipidemia and Follow-up (8 week f/u, post testing results which are complete. )    Subjective        History of Present Illness  Panda Rinaldi presents to [facility] for follow up.   History of Present Illness    Patient is a 63-year-old female with past medical history outlined below, significant for PACs/PVCs who presents for follow-up on recent testing.  She continues to note palpitations that have increased in intensity over the past few months.  She did initially note improvement in her palpitations after starting metoprolol but reports they have gotten worse lately.  She denies chest pain.  She has dyspnea on moderate effort.  She denies any dizziness, lightheadedness or syncope.      Past Medical History:   Diagnosis Date    Abnormal bone density screening 10/15/2014    osteoporosis- tried fosamax in the past    Acid reflux     Allergic 10year    Arthritis 07/15/2021    left hip pain. risk for falls.     Colon polyp 2021    Coronary artery disease 2018    Fatty liver 2022    Headache 06/01/1993    Heart disease     Hyperlipemia     Osteopenia     Osteoporosis 10/15/2014    Palpitations     Seasonal allergies     Shoulder pain        ALLERGY  Allergies   Allergen Reactions    Codeine Unknown - High Severity    Hydrocodone-Acetaminophen Shortness Of Breath    Tramadol Shortness Of Breath    Azithromycin Unknown - Low Severity    Kenalog [Triamcinolone Acetonide] Rash    Latex Unknown - Low Severity        Past Surgical History:   Procedure Laterality Date    COLONOSCOPY  10/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     COLONOSCOPY N/A 07/15/2021    Procedure: COLONOSCOPY with polypectomy/snare;  Surgeon: Adelfo Story MD;  Location: [facility] ENDOSCOPY;  Service: General;  Laterality: N/A;  colon polyps    COLONOSCOPY N/A 12/21/2023    Procedure: COLONOSCOPY with cold snare;  Surgeon: Adelfo Story MD;  Location: [facility] ENDOSCOPY;  Service: General;  Laterality: N/A;  colon polyp     ENDOSCOPY N/A 07/15/2021    Procedure: ESOPHAGOGASTRODUODENOSCOPY with biopsies;  Surgeon: Adelfo Story MD;  Location: [facility] ENDOSCOPY;  Service: General;  Laterality: N/A;  gastric nodule    ENDOSCOPY N/A 2023    Procedure: ESOPHAGOGASTRODUODENOSCOPY with biopsies;  Surgeon: Adelfo Story MD;  Location: [facility] ENDOSCOPY;  Service: General;  Laterality: N/A;  normal    SHOULDER SURGERY Right     [facility]     UPPER GASTROINTESTINAL ENDOSCOPY          Social History     Socioeconomic History    Marital status:    Tobacco Use    Smoking status: Former     Current packs/day: 0.00     Average packs/day: 0.3 packs/day for 5.4 years (1.3 ttl pk-yrs)     Types: Cigarettes     Start date: 1980     Quit date: 1985     Years since quittin.5     Passive exposure: Never    Smokeless tobacco: Never   Vaping Use    Vaping status: Never Used   Substance and Sexual Activity    Alcohol use: Not Currently     Comment: sometimes we have wine on holidays, usually 1-2 glasses    Drug use: Never    Sexual activity: Not Currently     Partners: Male     Birth control/protection: Vasectomy     Comment: Not sexually active in years.       Family History   Problem Relation Age of Onset    Heart attack Mother     Colon cancer Mother 65    Arthritis Mother     Heart disease Father     Breast cancer Sister     Stroke Other     Breast cancer Other     Malig Hyperthermia Neg Hx         Current Outpatient Medications on File Prior to Visit   Medication Sig    Acetaminophen 325 MG capsule     cetirizine (zyrTEC) 10 MG tablet Take 1 tablet by mouth Daily.    cholecalciferol (VITAMIN D3) 1.25 MG (46077 UT) capsule 1 capsule.    Diclofenac Sodium (VOLTAREN) 1 % gel gel Apply 4 g topically to the appropriate area as directed 2 (Two) Times a Day.    folic acid (FOLVITE) 1 MG tablet Take 1 tablet by mouth Daily.    Inositol Niacinate (NIACIN FLUSH FREE PO) Take  by mouth.    omeprazole (priLOSEC) 40 MG capsule  "Take 1 capsule by mouth Daily.    Rimegepant Sulfate (Nurtec) 75 MG tablet dispersible tablet Take 1 tablet by mouth 1 (One) Time As Needed (headache) for up to 1 dose.    rosuvastatin (Crestor) 5 MG tablet Take 1 tablet by mouth Daily for 90 days.    Turmeric (QC Tumeric Complex) 500 MG capsule Take 1 capsule by mouth Daily.    Unable to find Take 1 each by mouth 1 (One) Time. Med Name: GLUTATHRONE 500MG    vitamin D (ERGOCALCIFEROL) 1.25 MG (49386 UT) capsule capsule     [DISCONTINUED] metoprolol succinate XL (TOPROL-XL) 25 MG 24 hr tablet Take 1 tablet by mouth Daily.     Current Facility-Administered Medications on File Prior to Visit   Medication    denosumab (PROLIA) syringe 60 mg       Objective   Vitals:    07/09/24 1347   BP: 104/70   Pulse: 73   Weight: 59.3 kg (130 lb 12.8 oz)   Height: 162.6 cm (64\")       Physical Exam  Constitutional:       General: She is awake. She is not in acute distress.     Appearance: Normal appearance.   HENT:      Head: Normocephalic.      Nose: Nose normal. No congestion.   Eyes:      Extraocular Movements: Extraocular movements intact.      Conjunctiva/sclera: Conjunctivae normal.      Pupils: Pupils are equal, round, and reactive to light.   Neck:      Thyroid: No thyromegaly.      Vascular: No JVD.   Cardiovascular:      Rate and Rhythm: Normal rate and regular rhythm.      Chest Wall: PMI is not displaced.      Pulses: Normal pulses.      Heart sounds: Normal heart sounds, S1 normal and S2 normal. No murmur heard.     No friction rub. No gallop. No S3 or S4 sounds.   Pulmonary:      Effort: Pulmonary effort is normal.      Breath sounds: Normal breath sounds. No wheezing, rhonchi or rales.   Abdominal:      General: Bowel sounds are normal.      Palpations: Abdomen is soft.      Tenderness: There is no abdominal tenderness.   Musculoskeletal:      Cervical back: No tenderness.      Right lower leg: No edema.      Left lower leg: No edema.   Lymphadenopathy:      Cervical: " No cervical adenopathy.   Skin:     General: Skin is warm and dry.      Capillary Refill: Capillary refill takes less than 2 seconds.      Coloration: Skin is not cyanotic.      Findings: No petechiae or rash.      Nails: There is no clubbing.   Neurological:      Mental Status: She is alert.   Psychiatric:         Mood and Affect: Mood normal.         Behavior: Behavior is cooperative.           Result Review     The following data was reviewed by MADELYN Deleon on 07/09/24.      CMP          1/26/2024    13:55 4/30/2024    08:54 6/12/2024    14:27   CMP   Glucose 107  100  118    BUN 12  12  10    Creatinine 0.70  0.69  0.67    EGFR 97.3  97.7  98.4    Sodium 140  141  140    Potassium 5.4  4.1  4.1    Chloride 105  105  103    Calcium 10.2  9.5  9.3    Total Protein 8.2  7.0  7.1    Albumin 4.7  4.5  4.5    Globulin 3.5  2.5  2.6    Total Bilirubin 0.5  0.3  0.4    Alkaline Phosphatase 49  45  43    AST (SGOT) 23  19  17    ALT (SGPT) 21  22  19    Albumin/Globulin Ratio 1.3  1.8  1.7    BUN/Creatinine Ratio 17.1  17.4  14.9    Anion Gap 9.0  10.0  10.4      CBC w/diff          2/7/2024    10:00   CBC w/Diff   WBC 5.60    RBC 4.42    Hemoglobin 13.0    Hematocrit 39.6    MCV 89.6    MCH 29.4    MCHC 32.8    RDW 12.1    Platelets 185    Neutrophil Rel % 64.9    Immature Granulocyte Rel % 0.2    Lymphocyte Rel % 23.0    Monocyte Rel % 8.9    Eosinophil Rel % 2.5    Basophil Rel % 0.5       Lipid Panel          8/7/2023    11:28 1/26/2024    13:55 4/30/2024    08:54   Lipid Panel   Total Cholesterol 198  199  141    Triglycerides 171  169  236    HDL Cholesterol 43  41  34    VLDL Cholesterol 30  30  39    LDL Cholesterol  125  128  68    LDL/HDL Ratio 2.81  3.03  1.76        Results for orders placed during the hospital encounter of 06/20/24    Adult Transthoracic Echo Complete W/ Cont if Necessary Per Protocol    Interpretation Summary    Left ventricular systolic function is normal. Calculated left  ventricular EF = 66.4%    Left ventricular wall thickness is consistent with borderline concentric hypertrophy.    Left ventricular diastolic function is consistent with (grade I) impaired relaxation and age.    Estimated right ventricular systolic pressure from tricuspid regurgitation is normal (<35 mmHg).    Results for orders placed during the hospital encounter of 06/20/24    Treadmill Stress Test    Interpretation Summary  Reduced functional capacity due to joint pain.  Maximum workload achieved was 5.1 METS.  Study was terminated at 55% of the maximum predicted heart rate due to arthritis/left hip pain.  No significant arrhythmias were noted.  Baseline ECG showed normal sinus rhythm.  At peak stress, no ischemic ST-T changes were noted.  Study is indeterminate due to inability to achieve target heart rate.  Consider pharmacological stress test.    === Results for orders placed in visit on 05/15/24 ===    HOLTER MONITOR - 72 HOUR UP TO 15 DAY    - Interpretation Summary -  Baseline rhythm is sinus with an average heart rate of 71 bpm.  No significant bradycardia, pauses or AV block were noted.  Rare PACs were seen, mostly in isolated form.  Rare isolated PVCs were noted.  Patient's triggered events correlated with normal sinus rhythm/sinus rhythm with ectopy.  Overall, study is benign.          Procedures    Assessment & Plan  Diagnoses and all orders for this visit:    1. Premature ventricular contractions (Primary)  -     metoprolol succinate XL (TOPROL-XL) 25 MG 24 hr tablet; Take 1 tablet by mouth 2 (Two) Times a Day.  Dispense: 60 tablet; Refill: 3    2. Mixed dyslipidemia    3. Premature atrial contractions      Assessment & Plan        1.  Patient with persistent palpitations.  Increase metoprolol to 25 mg twice daily.  Recent Holter monitor was benign with rare PACs and PVCs noted.  Patient had a recent stress test that was indeterminant due to inability to achieve target heart rate.  She notes no chest  pain.  She had reduced functional capacity due to joint pain.  She had a recent echocardiogram demonstrated global LV systolic function with no medically significant valvular pathology.  Provided reassurance.  2.  Continue rosuvastatin.  3.  Increase metoprolol as discussed above.        The medical services provided during this encounter are part of ongoing care related to this patient's single serious condition or complex condition.      Follow Up   Return in about 6 months (around 1/9/2025) for With MADELYN Kirby.    Patient was given instructions and counseling regarding her condition or for health maintenance advice. Please see specific information pulled into the AVS if appropriate.         MADELYN Deleon  07/09/24  14:15 EDT    Dictated Utilizing Dragon Dictation

## 2024-07-30 ENCOUNTER — OFFICE VISIT (OUTPATIENT)
Dept: FAMILY MEDICINE CLINIC | Facility: CLINIC | Age: 64
End: 2024-07-30
Payer: MEDICARE

## 2024-07-30 VITALS
TEMPERATURE: 97.6 F | WEIGHT: 125.4 LBS | OXYGEN SATURATION: 99 % | RESPIRATION RATE: 19 BRPM | HEIGHT: 63 IN | SYSTOLIC BLOOD PRESSURE: 120 MMHG | HEART RATE: 72 BPM | DIASTOLIC BLOOD PRESSURE: 80 MMHG | BODY MASS INDEX: 22.22 KG/M2

## 2024-07-30 DIAGNOSIS — I49.3 PREMATURE VENTRICULAR CONTRACTIONS: ICD-10-CM

## 2024-07-30 DIAGNOSIS — E78.2 MIXED HYPERLIPIDEMIA: ICD-10-CM

## 2024-07-30 DIAGNOSIS — J30.2 SEASONAL ALLERGIC RHINITIS, UNSPECIFIED TRIGGER: ICD-10-CM

## 2024-07-30 DIAGNOSIS — L98.9 SKIN LESION OF FACE: Primary | ICD-10-CM

## 2024-07-30 PROCEDURE — G2211 COMPLEX E/M VISIT ADD ON: HCPCS | Performed by: FAMILY MEDICINE

## 2024-07-30 PROCEDURE — 1125F AMNT PAIN NOTED PAIN PRSNT: CPT | Performed by: FAMILY MEDICINE

## 2024-07-30 PROCEDURE — 99214 OFFICE O/P EST MOD 30 MIN: CPT | Performed by: FAMILY MEDICINE

## 2024-07-30 RX ORDER — CETIRIZINE HYDROCHLORIDE 10 MG/1
10 TABLET ORAL DAILY
Qty: 90 TABLET | Refills: 1 | Status: SHIPPED | OUTPATIENT
Start: 2024-07-30

## 2024-07-30 RX ORDER — METOPROLOL SUCCINATE 25 MG/1
25 TABLET, EXTENDED RELEASE ORAL 2 TIMES DAILY
Qty: 60 TABLET | Refills: 3 | Status: SHIPPED | OUTPATIENT
Start: 2024-07-30

## 2024-07-30 RX ORDER — FOLIC ACID 1 MG/1
1 TABLET ORAL DAILY
COMMUNITY
End: 2024-07-30 | Stop reason: SDUPTHER

## 2024-07-30 RX ORDER — ERGOCALCIFEROL 1.25 MG/1
50000 CAPSULE ORAL
Qty: 12 CAPSULE | Refills: 3 | Status: SHIPPED | OUTPATIENT
Start: 2024-07-30 | End: 2024-10-28

## 2024-07-30 RX ORDER — ROSUVASTATIN CALCIUM 5 MG/1
5 TABLET, COATED ORAL DAILY
Qty: 90 TABLET | Refills: 1 | Status: SHIPPED | OUTPATIENT
Start: 2024-07-30 | End: 2024-10-28

## 2024-07-30 RX ORDER — FOLIC ACID 1 MG/1
1 TABLET ORAL DAILY
Qty: 90 TABLET | Refills: 3 | Status: SHIPPED | OUTPATIENT
Start: 2024-07-30 | End: 2024-10-28

## 2024-07-30 NOTE — PROGRESS NOTES
Chief Complaint  Medication Administration    Subjective        Panda Rinaldi presents to [facility]  History of Present Illness  The patient presents for evaluation of multiple medical concerns. She is accompanied by an adult male.    She has been managing her reflux with omeprazole for the past 6 months, which has significantly improved her condition. She is uncertain about the necessity of continuing the medication. She has been avoiding salty foods and has noticed an improvement in her condition. However, she occasionally experiences throat pain and perceives a sensation of something being lodged in her throat. She has a scheduled appointment with Dr. Alves in the first week of 09/2024.    Three months ago, her cholesterol levels were found to be elevated. She admits to consuming ice cream.    She continues to take folic acid, which she believes has improved her hair condition.    She continues to take Zyrtec for her allergies.    She has not taken Nurtec for her headaches recently.    She continues to take half a tablet of metoprolol.    She noticed a small bump on her forehead, which does not itch.    FAMILY HISTORY  She denies any family history of skin cancer.      Past Medical History:   Diagnosis Date    Abnormal bone density screening 10/15/2014    osteoporosis- tried fosamax in the past    Acid reflux     Allergic 10year    Arthritis 07/15/2021    left hip pain. risk for falls.     Colon polyp 2021    Coronary artery disease 2018    Fatty liver 2022    Headache 06/01/1993    Heart disease     Hyperlipemia     Osteopenia     Osteoporosis 10/15/2014    Palpitations     Seasonal allergies     Shoulder pain       Family History   Problem Relation Age of Onset    Heart attack Mother     Colon cancer Mother 65    Arthritis Mother     Heart disease Father     Breast cancer Sister     Stroke Other     Breast cancer Other     Malig Hyperthermia Neg Hx       Social History  "    Socioeconomic History    Marital status:    Tobacco Use    Smoking status: Former     Current packs/day: 0.00     Average packs/day: 0.3 packs/day for 5.4 years (1.3 ttl pk-yrs)     Types: Cigarettes     Start date: 1980     Quit date: 1985     Years since quittin.6     Passive exposure: Past    Smokeless tobacco: Never   Vaping Use    Vaping status: Never Used   Substance and Sexual Activity    Alcohol use: Not Currently     Comment: sometimes we have wine on holidays, usually 1-2 glasses    Drug use: Never    Sexual activity: Not Currently     Partners: Male     Birth control/protection: Vasectomy     Comment: Not sexually active in years.        Objective   Vital Signs:  /80 (BP Location: Left arm, Patient Position: Sitting, Cuff Size: Adult)   Pulse 72   Temp 97.6 °F (36.4 °C) (Oral)   Resp 19   Ht 160 cm (62.99\")   Wt 56.9 kg (125 lb 6.4 oz)   SpO2 99%   BMI 22.22 kg/m²   Estimated body mass index is 22.22 kg/m² as calculated from the following:    Height as of this encounter: 160 cm (62.99\").    Weight as of this encounter: 56.9 kg (125 lb 6.4 oz).     BMI is within normal parameters. No other follow-up for BMI required.    Review of Systems   Constitutional:  Negative for activity change, chills, fatigue and fever.   HENT:  Negative for congestion, sinus pressure, sinus pain and sore throat.    Eyes:  Negative for discharge and redness.   Respiratory:  Negative for cough and chest tightness.    Cardiovascular:  Negative for chest pain, palpitations and leg swelling.   Gastrointestinal:  Negative for abdominal pain and diarrhea.   Endocrine: Negative for cold intolerance and heat intolerance.   Genitourinary:  Negative for difficulty urinating and dysuria.   Musculoskeletal:  Negative for gait problem and neck stiffness.   Skin:  Negative for pallor and rash.   Neurological:  Negative for dizziness and headaches.   Psychiatric/Behavioral:  Negative for agitation, " behavioral problems and confusion.       Physical Exam  Heart sounds are normal. No sick arrhythmias.    Vital Signs  Blood pressure is 120/80.  Physical Exam  Vitals reviewed.   Constitutional:       Appearance: Normal appearance.   HENT:      Right Ear: Tympanic membrane normal.      Left Ear: Tympanic membrane normal.      Nose: Nose normal.   Eyes:      Extraocular Movements: Extraocular movements intact.      Conjunctiva/sclera: Conjunctivae normal.      Pupils: Pupils are equal, round, and reactive to light.   Cardiovascular:      Rate and Rhythm: Normal rate and regular rhythm.   Pulmonary:      Effort: Pulmonary effort is normal.      Breath sounds: Normal breath sounds.   Abdominal:      General: Bowel sounds are normal.   Musculoskeletal:         General: Normal range of motion.      Cervical back: Normal range of motion.   Skin:     General: Skin is warm and dry.   Neurological:      General: No focal deficit present.      Mental Status: She is alert and oriented to person, place, and time.   Psychiatric:         Mood and Affect: Mood normal.         Behavior: Behavior normal.          Result Review       Results  Laboratory Studies  Vitamin D was 60.    Imaging  Mammogram was normal.             Assessment and Plan     Diagnoses and all orders for this visit:    1. Skin lesion of face (Primary)  -     Ambulatory Referral to Dermatology    2. Mixed hyperlipidemia  -     rosuvastatin (Crestor) 5 MG tablet; Take 1 tablet by mouth Daily for 90 days.  Dispense: 90 tablet; Refill: 1    3. Premature ventricular contractions  -     metoprolol succinate XL (TOPROL-XL) 25 MG 24 hr tablet; Take 1 tablet by mouth 2 (Two) Times a Day.  Dispense: 60 tablet; Refill: 3    4. Seasonal allergic rhinitis, unspecified trigger  Comments:  stable on zyrtec 10mg, continue  Orders:  -     cetirizine (zyrTEC) 10 MG tablet; Take 1 tablet by mouth Daily.  Dispense: 90 tablet; Refill: 1    Other orders  -     folic acid (FOLVITE) 1  MG tablet; Take 1 tablet by mouth Daily for 90 days.  Dispense: 90 tablet; Refill: 3  -     vitamin D (ERGOCALCIFEROL) 1.25 MG (89947 UT) capsule capsule; Take 1 capsule by mouth Every 7 (Seven) Days for 90 days.  Dispense: 12 capsule; Refill: 3      Assessment & Plan  1. Globus sensation.  Omeprazole was discontinued to observe any changes in her heartburn symptoms. Should reflux recur, an alternative medication such as famotidine or cimetidine will be considered.    2. Hypercholesterolemia.  She was advised to limit her intake of sweets. Continuation of Crestor was advised. Kress-3 supplementation was advised.    3. Hair loss.  Continuation of folic acid was recommended.    4. Health maintenance.  Her mammogram results were normal. Continuation of vitamin D was recommended, especially considering the recent Prolia injection. Prescriptions for metoprolol, rosuvastatin, folic acid, vitamin D, and Zyrtec were renewed.    5. Forehead rash.  The rash does not resemble pimples or acne. A referral to a dermatologist was made for further evaluation.       I spent 35 minutes caring for Tae on this date of service. This time includes time spent by me in the following activities:reviewing tests  Follow Up   Return in about 3 months (around 10/30/2024).  Patient was given instructions and counseling regarding her condition or for health maintenance advice. Please see specific information pulled into the AVS if appropriate.   Patient or patient representative verbalized consent for the use of Ambient Listening during the visit with  Lorri White MD for chart documentation. 7/30/2024  22:51 EDT    Lorri White MD

## 2024-09-04 ENCOUNTER — OFFICE VISIT (OUTPATIENT)
Dept: OTOLARYNGOLOGY | Facility: CLINIC | Age: 64
End: 2024-09-04
Payer: MEDICARE

## 2024-09-04 VITALS — BODY MASS INDEX: 22.5 KG/M2 | HEIGHT: 63 IN | TEMPERATURE: 97.5 F | WEIGHT: 127 LBS

## 2024-09-04 DIAGNOSIS — J38.7 VALLECULAR MASS: ICD-10-CM

## 2024-09-04 DIAGNOSIS — K21.00 GASTROESOPHAGEAL REFLUX DISEASE WITH ESOPHAGITIS WITHOUT HEMORRHAGE: ICD-10-CM

## 2024-09-04 DIAGNOSIS — R13.12 OROPHARYNGEAL DYSPHAGIA: Primary | ICD-10-CM

## 2024-09-04 DIAGNOSIS — J39.2 THORNWALDT'S CYST: ICD-10-CM

## 2024-09-04 NOTE — PROGRESS NOTES
Patient Name: Panda Rinaldi   Visit Date: 09/04/2024   Patient ID: [id number]  Provider: Smith Cochran MD    Sex: female  [place] [facility]   YOB: 1960  Location Address: [address] [place] [address], [address] [place] [address],?[place]?[id number]    Primary Care Provider Lorri White MD  Location Phone: [phone]    Referring Provider: No ref. provider found        Chief Complaint  6 month flex scope    History of Present Illness  Panda Rinaldi is a 64 y.o. female who presents to [facility] for 6 month flex scope. Patient had difficulty with swallowing for about a year. Patient also had recently EGD and colonoscopy done by Dr. Story about 2 months ago. Biopsy of the GE junction showed intestinal metaplasia. Otherwise rest of biopsies were unremarkable other than mild inflammation. She had a CT scan of the neck and head which was unremarkable except for questionable soft tissue density in the vallecula. Therefore ENT was requested for further examination and management. Patient has been diagnosed also with GERD with esophagitis and has been treated with Prilosec.   Patient has significant reflux disease and the reflux is coming up into the laryngeal area confirmed with flexible nasolaryngoscopy.  I would suggest that patient continue the Prilosec for now and also instructed on dietary changes to avoid anything that really causes gastric acid production in terms of type of foods and also drinks as well.  Patient has a Thornwaldt cyst which is quite small and is probably not a problematic  CT scan of the neck showed some variation in the vallecular area which they thought it might be secretion blood on direct examination showed no evidence of any mass or lesion in the vallecula or the epiglottis or the base of tongue.  Patient is here for 6 months follow-up with flexible nasolaryngoscopy.  Past Medical History:   Diagnosis Date    Abnormal bone density screening  10/15/2014    osteoporosis- tried fosamax in the past    Acid reflux     Allergic 10year    Allergic rhinitis     Arthritis 07/15/2021    left hip pain. risk for falls.     Colon polyp 2021    Coronary artery disease 2018    Fatty liver 2022    Headache 06/01/1993    Heart disease     Hyperlipemia     Osteopenia     Osteoporosis 10/15/2014    Palpitations     Seasonal allergies     Shoulder pain        Past Surgical History:   Procedure Laterality Date    COLONOSCOPY  10/2015    ftRaciel sales     COLONOSCOPY N/A 07/15/2021    Procedure: COLONOSCOPY with polypectomy/snare;  Surgeon: Adelfo Story MD;  Location: [facility] ENDOSCOPY;  Service: General;  Laterality: N/A;  colon polyps    COLONOSCOPY N/A 12/21/2023    Procedure: COLONOSCOPY with cold snare;  Surgeon: Adelfo Story MD;  Location: [facility] ENDOSCOPY;  Service: General;  Laterality: N/A;  colon polyp    ENDOSCOPY N/A 07/15/2021    Procedure: ESOPHAGOGASTRODUODENOSCOPY with biopsies;  Surgeon: Adelfo Story MD;  Location: [facility] ENDOSCOPY;  Service: General;  Laterality: N/A;  gastric nodule    ENDOSCOPY N/A 12/21/2023    Procedure: ESOPHAGOGASTRODUODENOSCOPY with biopsies;  Surgeon: Adelfo Story MD;  Location: [facility] ENDOSCOPY;  Service: General;  Laterality: N/A;  normal    SHOULDER SURGERY Right 2014    [facility]     UPPER GASTROINTESTINAL ENDOSCOPY           Current Outpatient Medications:     Acetaminophen 325 MG capsule, , Disp: , Rfl:     cetirizine (zyrTEC) 10 MG tablet, Take 1 tablet by mouth Daily., Disp: 90 tablet, Rfl: 1    Diclofenac Sodium (VOLTAREN) 1 % gel gel, Apply 4 g topically to the appropriate area as directed 2 (Two) Times a Day., Disp: 2 g, Rfl: 2    folic acid (FOLVITE) 1 MG tablet, Take 1 tablet by mouth Daily for 90 days., Disp: 90 tablet, Rfl: 3    Inositol Niacinate (NIACIN FLUSH FREE PO), Take  by mouth., Disp: , Rfl:     metoprolol succinate XL (TOPROL-XL) 25 MG 24 hr tablet, Take 1 tablet by mouth 2 (Two) Times a Day.,  "Disp: 60 tablet, Rfl: 3    omeprazole (priLOSEC) 40 MG capsule, Take 1 capsule by mouth Daily., Disp: , Rfl:     Rimegepant Sulfate (Nurtec) 75 MG tablet dispersible tablet, Take 1 tablet by mouth 1 (One) Time As Needed (headache) for up to 1 dose., Disp: 15 tablet, Rfl: 2    rosuvastatin (Crestor) 5 MG tablet, Take 1 tablet by mouth Daily for 90 days., Disp: 90 tablet, Rfl: 1    Turmeric (QC Tumeric Complex) 500 MG capsule, Take 1 capsule by mouth Daily., Disp: , Rfl:     vitamin D (ERGOCALCIFEROL) 1.25 MG (35599 UT) capsule capsule, Take 1 capsule by mouth Every 7 (Seven) Days for 90 days., Disp: 12 capsule, Rfl: 3  No current facility-administered medications for this visit.    Facility-Administered Medications Ordered in Other Visits:     denosumab (PROLIA) syringe 60 mg, 60 mg, Subcutaneous, Once, Lorri White MD     Allergies   Allergen Reactions    Codeine Unknown - High Severity    Hydrocodone-Acetaminophen Shortness Of Breath    Tramadol Shortness Of Breath    Azithromycin Unknown - Low Severity    Kenalog [Triamcinolone Acetonide] Rash    Latex Unknown - Low Severity       Social History     Tobacco Use    Smoking status: Former     Current packs/day: 0.00     Average packs/day: 0.3 packs/day for 5.4 years (1.3 ttl pk-yrs)     Types: Cigarettes     Start date: 1980     Quit date: 1985     Years since quittin.7     Passive exposure: Past    Smokeless tobacco: Never   Vaping Use    Vaping status: Never Used   Substance Use Topics    Alcohol use: Not Currently     Comment: sometimes we have wine on holidays, usually 1-2 glasses    Drug use: Never        Objective     Vital Signs:   Vitals:    24 1404   Temp: 97.5 °F (36.4 °C)   TempSrc: Temporal   Weight: 57.6 kg (127 lb)   Height: 160 cm (62.99\")       Tobacco Use: Medium Risk (2024)    Patient History     Smoking Tobacco Use: Former     Smokeless Tobacco Use: Never     Passive Exposure: Past         Physical " Exam    Constitutional   Appearance  well developed, well-nourished, alert and in no acute distress, voice clear and strong    Head   Inspection  no deformities or lesions      Face   Inspection  no facial lesions; House-Brackmann I/VI bilaterally   Palpation  no TMJ crepitus nor  muscle tenderness bilaterally     Eyes/Vision   Visual Fields  extraocular movements are intact, no spontaneous or gaze-induced nystagmus  Conjunctivae  clear   Sclerae  clear   Pupils and Irises  pupils equal, round, and reactive to light.   Nystagmus  not present     Ears, Nose, Mouth and Throat  Ears  External Ears  appearance within normal limits, no lesions present   Otoscopic Examination  tympanic membrane appearance within normal limits bilaterally without perforations, well-aerated middle ears   Hearing  intact to conversational voice both ears   Tunning fork testing    Rinne:  Bentley:    Nose  External Nose  appearance normal   Intranasal Exam  mucosa within normal limits, vestibules normal, no intranasal lesions present, septum midline, sinuses non tender to percussion   Modified [place] Test:    Oral Cavity  Oral Mucosa  oral mucosa normal without pallor or cyanosis   Lips  lip appearance normal   Teeth  normal dentition for age   Gums  gums pink, non-swollen, no bleeding present   Tongue  tongue appearance normal; normal mobility   Palate  hard palate normal, soft palate appearance normal with symmetric mobility     Throat  Oropharynx  no inflammation or lesions present, tonsils within normal limits   Hypopharynx  appearance within normal limits   Larynx  voice normal     Neck  Inspection/Palpation  normal appearance, no masses or tenderness, trachea midline; thyroid size normal, nontender, no nodules or masses present on palpation     Lymphatic  Neck  no lymphadenopathy present   Supraclavicular Nodes  no lymphadenopathy present   Preauricular Nodes  no lymphadenopathy present     Respiratory  Respiratory  Effort  breathing unlabored   Inspection of Chest  normal appearance, no retractions     Musculoskeletal   Cervical back: Normal range of motion and neck supple.      Skin and Subcutaneous Tissue  General Inspection  Regarding face and neck - there are no rashes present, no lesions present, and no areas of discoloration     Neurologic  Cranial Nerves  cranial nerves II-XII are grossly intact bilaterally   Gait and Station  normal gait, able to stand without diffculty    Psychiatric  Judgement and Insight  judgment and insight intact   Mood and Affect  mood normal, affect appropriate       RESULTS REVIEWED    I have reviewed the following information:   []  Previous Internal Note  []  Previous External Note:   []  Ordered Tests & Results:      Pathology:   Lab Results   Component Value Date    CASEREPORT  12/21/2023     Surgical Pathology Report                         Case: [id number]                                  Authorizing Provider:  Adelfo Story MD      Collected:           12/21/2023 01:02 PM          Ordering Location:     [facility] Received:            12/22/2023 05:53 AM                                 SUITES                                                                       Pathologist:           Daisy Bess DO                                                       Specimens:   1) - Large Intestine, Transverse Colon, distal transverse colon polyp cold snare                    2) - Large Intestine, Transverse Colon, proximal transverse colon polyp cold sare                   3) - Small Intestine, duodenum biopsy                                                               4) - Gastric, Antrum, gastric antrum biopsy                                                         5) - Gastric, Body, gastric body biopsy                                                             6) - GE Junction, ge junction biopsy                                                                7) -  "Esophagus, Mid, mid esophagus biopsy                                                  CLININFO  12/21/2023     Gastroesophageal reflux disease with esophagitis without hemorrhage  Oropharyngeal dysphagia  Intestinal metaplasia of gastric mucosa  Screening for malignant neoplasm of colon  History of colonic polyps      FINALDX  12/21/2023     1.  Distal transverse colon polyp, biopsy:   - Tubular adenoma      2.  Proximal transverse colon polyp, biopsy:   - Tubular adenoma      3. Duodenum, biopsy:   - Mild chronic duodenitis    4.  Stomach, antrum, biopsy:   - Gastric antrum mucosa with mild chronic inactive gastritis and intestinal metaplasia   - Negative for Helicobacter pylori on routine H&E stain   - Negative for dysplasia and malignancy      5. Stomach, body, biopsy:   - Gastric body/fundus mucosa with no significant pathologic change   - Negative for Helicobacter pylori on routine H&E stain   - Negative for intestinal metaplasia, dysplasia and malignancy      6. Gastroesophageal junction, biopsy:   - Squamocolumnar mucosa with intestinal metaplasia   - Negative for dysplasia and malignancy      7.Mid esophagus, biopsy:   - Squamous mucosa with no significant pathologic change   - Negative for eosinophilic esophagitis        GROSSDES  12/21/2023     1. Large Intestine, Transverse Colon.  Received in formalin and labeled \" distal transverse colon polyp cold snare\" is a 0.3 cm fragment of tan soft tissue. The specimen is entirely submitted in one cassette.    2. Large Intestine, Transverse Colon.  Received in formalin and labeled \" proximal transverse colon polyp cold snare\" is a 0.3 cm fragment of tan soft tissue. The specimen is entirely submitted in one cassette.    3. Small Intestine.  Received in formalin and labeled \" duodenum\" are two fragments of tan soft tissue measuring 0.3-0.4 cm in greatest dimension. The specimen is entirely submitted in one cassette.    4. Gastric, Antrum.  Received in formalin and " "labeled \" gastric antrum\" is a 0.7 cm aggregate of tan soft tissue fragments. The specimen is entirely submitted in one cassette.    5. Gastric, Body.  Received in formalin and labeled \" gastric body\" is a 0.7 cm aggregate of tan soft tissue fragments. The specimen is entirely submitted in one cassette.    6. GE Junction.  Received in formalin and labeled \" GE junction\" is a 0.7 cm aggregate of tan soft tissue fragments. The specimen is entirely submitted in one cassette.    7. Esophagus, Mid.  Received in formalin and labeled \" midesophagus\" are two fragments of tan soft tissue measuring 0.2-0.3 cm in greatest dimension. The specimen is entirely submitted in one cassette.   MENDEZ      MICRO  12/21/2023     Microscopic examination performed.         TSH   Date Value Ref Range Status   01/19/2023 1.180 0.270 - 4.200 uIU/mL Final     Free T4   Date Value Ref Range Status   01/19/2023 1.23 0.93 - 1.70 ng/dL Final     Calcium   Date Value Ref Range Status   06/12/2024 9.3 8.6 - 10.5 mg/dL Final     25 Hydroxy, Vitamin D   Date Value Ref Range Status   06/12/2024 60.3 30.0 - 100.0 ng/ml Final       No Images in the past 120 days found..    I have discussed the interpretation of the above results with the patient.    Flexible laryngoscopy    Date/Time: 9/4/2024 2:59 PM    Performed by: Smith Cochran MD  Authorized by: Smith Cochran MD    Consent:     Consent obtained:  Verbal    Consent given by:  Patient    Risks discussed:  Bleeding    Alternatives discussed:  No treatment and delayed treatment  Procedure details:     Medication:  Afrin    Instrument: flexible fiberoptic laryngoscope      Scope location: right nare    Post-procedure details:     Patient tolerance of procedure:  Tolerated well  Comments:      After topical anesthetic and decongestant application in both nostrils.  Flexible fiberoptic scope was passed through the right nostril.  Nasopharynx was unremarkable.  Otherwise hypopharynx and larynx was also " unremarkable as well.  There is still little bit of inflammatory redness or erythema over the arytenoids present.  However true vocal cords and false vocal cords were unremarkable and mobile with excellent adduction and abduction.  Subglottis was normal as well and piriform sinuses were symmetric and unremarkable.            Assessment and Plan   Diagnoses and all orders for this visit:    1. Oropharyngeal dysphagia (Primary)    2. Gastroesophageal reflux disease with esophagitis without hemorrhage    3. Vallecular mass    4. Thornwaldt's cyst    Other orders  -     $ Laryngoscopy        (R13.12) Oropharyngeal dysphagia    (K21.00) Gastroesophageal reflux disease with esophagitis without hemorrhage    (J38.7) Vallecular mass    (J39.2) Thornwaldt's cyst     Panda Rinaldi  reports that she quit smoking about 38 years ago. Her smoking use included cigarettes. She started smoking about 44 years ago. She has a 1.3 pack-year smoking history. She has been exposed to tobacco smoke. She has never used smokeless tobacco.     Plan:  Patient Instructions   1.  Patient had significant reflux disease that was evidenced on flexible nasolaryngoscopy on last visit.  Repeat nasolaryngoscopy today shows that the her erythema over the arytenoids have improved.  And her true vocal cord functions are also unremarkable.  Patient has done some several changes in her dietary modifications and seems to be helping.  2.  I encouraged patient to continue this effort and modifying her diet by eating early small amounts especially in the evenings and giving at least 4 hours before going and laying down.  I also encouraged her to eat things that are not producing a lot of acid and avoid those that such as tomatoes, acidic foods, spicy foods, and fatty foods.  Also important to avoid any caffeinated products in the evenings.  3.  I am going to see patient as needed and she will also take Prilosec only as needed basis.      Follow Up   Return if  symptoms worsen or fail to improve.  Patient was given instructions and counseling regarding her condition or for health maintenance advice. Please see specific information pulled into the AVS if appropriate.

## 2024-09-04 NOTE — PATIENT INSTRUCTIONS
1.  Patient had significant reflux disease that was evidenced on flexible nasolaryngoscopy on last visit.  Repeat nasolaryngoscopy today shows that the her erythema over the arytenoids have improved.  And her true vocal cord functions are also unremarkable.  Patient has done some several changes in her dietary modifications and seems to be helping.  2.  I encouraged patient to continue this effort and modifying her diet by eating early small amounts especially in the evenings and giving at least 4 hours before going and laying down.  I also encouraged her to eat things that are not producing a lot of acid and avoid those that such as tomatoes, acidic foods, spicy foods, and fatty foods.  Also important to avoid any caffeinated products in the evenings.  3.  I am going to see patient as needed and she will also take Prilosec only as needed basis.

## 2024-10-02 DIAGNOSIS — M81.0 AGE-RELATED OSTEOPOROSIS WITHOUT CURRENT PATHOLOGICAL FRACTURE: Primary | ICD-10-CM

## 2024-10-07 ENCOUNTER — OFFICE VISIT (OUTPATIENT)
Dept: FAMILY MEDICINE CLINIC | Facility: CLINIC | Age: 64
End: 2024-10-07
Payer: MEDICARE

## 2024-10-07 VITALS
DIASTOLIC BLOOD PRESSURE: 74 MMHG | HEART RATE: 59 BPM | SYSTOLIC BLOOD PRESSURE: 114 MMHG | HEIGHT: 63 IN | BODY MASS INDEX: 22.7 KG/M2 | TEMPERATURE: 97.9 F | WEIGHT: 128.1 LBS | OXYGEN SATURATION: 98 %

## 2024-10-07 DIAGNOSIS — E78.5 HYPERLIPIDEMIA, UNSPECIFIED HYPERLIPIDEMIA TYPE: Primary | ICD-10-CM

## 2024-10-07 DIAGNOSIS — Z23 FLU VACCINE NEED: ICD-10-CM

## 2024-10-07 DIAGNOSIS — M81.0 AGE-RELATED OSTEOPOROSIS WITHOUT CURRENT PATHOLOGICAL FRACTURE: ICD-10-CM

## 2024-10-07 DIAGNOSIS — K21.9 GASTROESOPHAGEAL REFLUX DISEASE WITHOUT ESOPHAGITIS: ICD-10-CM

## 2024-10-07 DIAGNOSIS — Z00.00 ENCOUNTER FOR SUBSEQUENT ANNUAL WELLNESS VISIT (AWV) IN MEDICARE PATIENT: ICD-10-CM

## 2024-10-07 LAB
CHOLEST SERPL-MCNC: 135 MG/DL (ref 0–200)
HDLC SERPL-MCNC: 41 MG/DL (ref 40–60)
LDLC SERPL CALC-MCNC: 67 MG/DL (ref 0–100)
LDLC/HDLC SERPL: 1.51 {RATIO}
TRIGL SERPL-MCNC: 160 MG/DL (ref 0–150)
VLDLC SERPL-MCNC: 27 MG/DL (ref 5–40)

## 2024-10-07 PROCEDURE — 36415 COLL VENOUS BLD VENIPUNCTURE: CPT | Performed by: FAMILY MEDICINE

## 2024-10-07 PROCEDURE — G0439 PPPS, SUBSEQ VISIT: HCPCS | Performed by: FAMILY MEDICINE

## 2024-10-07 PROCEDURE — 90656 IIV3 VACC NO PRSV 0.5 ML IM: CPT | Performed by: FAMILY MEDICINE

## 2024-10-07 PROCEDURE — 80061 LIPID PANEL: CPT | Performed by: FAMILY MEDICINE

## 2024-10-07 PROCEDURE — 99214 OFFICE O/P EST MOD 30 MIN: CPT | Performed by: FAMILY MEDICINE

## 2024-10-07 PROCEDURE — G0008 ADMIN INFLUENZA VIRUS VAC: HCPCS | Performed by: FAMILY MEDICINE

## 2024-10-07 PROCEDURE — 1125F AMNT PAIN NOTED PAIN PRSNT: CPT | Performed by: FAMILY MEDICINE

## 2024-10-07 RX ORDER — CYANOCOBALAMIN (VITAMIN B-12) 500 MCG
500 TABLET ORAL DAILY
COMMUNITY

## 2024-10-07 RX ORDER — TRETINOIN 0.5 MG/G
CREAM TOPICAL
COMMUNITY
Start: 2024-09-11

## 2024-10-07 NOTE — PROGRESS NOTES
Subjective   The ABCs of the Annual Wellness Visit  Medicare Wellness Visit      Panda Rinaldi is a 64 y.o. patient who presents for a Medicare Wellness Visit.    The following portions of the patient's history were reviewed and   updated as appropriate: allergies, current medications, past family history, past medical history, past social history, past surgical history, and problem list.    Compared to one year ago, the patient's physical   health is worse due to arthritis.  Compared to one year ago, the patient's mental   health is the same.    Recent Hospitalizations:  She was admitted within the past 365 days at [facility].     Current Medical Providers:  Patient Care Team:  Lorri White MD as PCP - General  Leo Velazquez MD as Consulting Physician (Gastroenterology)  Lesa Alexandra APRN as Nurse Practitioner (Gastroenterology)  Yana Rivera APRN as Nurse Practitioner (Nurse Practitioner)    Outpatient Medications Prior to Visit   Medication Sig Dispense Refill    Acetaminophen 325 MG capsule       cetirizine (zyrTEC) 10 MG tablet Take 1 tablet by mouth Daily. 90 tablet 1    cyanocobalamin (VITAMIN B-12) 500 MCG tablet Take 1 tablet by mouth Daily.      Diclofenac Sodium (VOLTAREN) 1 % gel gel Apply 4 g topically to the appropriate area as directed 2 (Two) Times a Day. 2 g 2    folic acid (FOLVITE) 1 MG tablet Take 1 tablet by mouth Daily for 90 days. 90 tablet 3    metoprolol succinate XL (TOPROL-XL) 25 MG 24 hr tablet Take 1 tablet by mouth 2 (Two) Times a Day. 60 tablet 3    rosuvastatin (Crestor) 5 MG tablet Take 1 tablet by mouth Daily for 90 days. 90 tablet 1    tretinoin (RETIN-A) 0.05 % cream       Turmeric (QC Tumeric Complex) 500 MG capsule Take 1 capsule by mouth Daily.      vitamin D (ERGOCALCIFEROL) 1.25 MG (19031 UT) capsule capsule Take 1 capsule by mouth Every 7 (Seven) Days for 90 days. 12 capsule 3    Inositol Niacinate (NIACIN FLUSH FREE PO) Take  by mouth. (Patient  "not taking: Reported on 10/7/2024)      omeprazole (priLOSEC) 40 MG capsule Take 1 capsule by mouth Daily. (Patient not taking: Reported on 10/7/2024)      Rimegepant Sulfate (Nurtec) 75 MG tablet dispersible tablet Take 1 tablet by mouth 1 (One) Time As Needed (headache) for up to 1 dose. (Patient not taking: Reported on 10/7/2024) 15 tablet 2     Facility-Administered Medications Prior to Visit   Medication Dose Route Frequency Provider Last Rate Last Admin    denosumab (PROLIA) syringe 60 mg  60 mg Subcutaneous Once Lorri White MD         No opioid medication identified on active medication list. I have reviewed chart for other potential  high risk medication/s and harmful drug interactions in the elderly.      Aspirin is not on active medication list.  Aspirin use is contraindicated for this patient due to: GERD.  .    Patient Active Problem List   Diagnosis    Osteoarthritis    Heart disease    Hyperlipemia    Radicular pain    Neck pain    Palpitations    Prolapsed cervical intervertebral disc    Seasonal allergic rhinitis    Shoulder pain    Acid reflux    Colon polyps    Age-related osteoporosis without current pathological fracture    Oropharyngeal dysphagia    Intestinal metaplasia of gastric mucosa    Screening for malignant neoplasm of colon    History of colonic polyps     Advance Care Planning Advance Directive is not on file.  ACP discussion was held with the patient during this visit. Patient does not have an advance directive, information provided.            Objective   Vitals:    10/07/24 0816   BP: 114/74   Pulse: 59   Temp: 97.9 °F (36.6 °C)   SpO2: 98%   Weight: 58.1 kg (128 lb 1.6 oz)   Height: 160 cm (62.99\")   PainSc:   2       Estimated body mass index is 22.7 kg/m² as calculated from the following:    Height as of this encounter: 160 cm (62.99\").    Weight as of this encounter: 58.1 kg (128 lb 1.6 oz).    BMI is within normal parameters. No other follow-up for BMI required.     "   Does the patient have evidence of cognitive impairment? No  Lab Results   Component Value Date    TRIG 160 (H) 10/07/2024    HDL 41 10/07/2024    LDL 67 10/07/2024    VLDL 27 10/07/2024                                                                                                Health  Risk Assessment    Smoking Status:  Social History     Tobacco Use   Smoking Status Former    Current packs/day: 0.00    Average packs/day: 0.3 packs/day for 5.4 years (1.3 ttl pk-yrs)    Types: Cigarettes    Start date: 1980    Quit date: 1985    Years since quittin.8    Passive exposure: Past   Smokeless Tobacco Never     Alcohol Consumption:  Social History     Substance and Sexual Activity   Alcohol Use Not Currently    Comment: sometimes we have wine on holidays, usually 1-2 glasses       Fall Risk Screen  POOJAADI Fall Risk Assessment was completed, and patient is at LOW risk for falls.Assessment completed on:10/7/2024    Depression Screening:      10/7/2024     8:14 AM   PHQ-2/PHQ-9 Depression Screening   Retired Little Interest or Pleasure in Doing Things 1-->several days   Retired Feeling Down, Depressed or Hopeless 1-->several days   Retired Trouble Falling or Staying Asleep, or Sleeping Too Much 0-->not at all   Retired Feeling Tired or Having Little Energy 3-->nearly every day   Retired Poor Appetite or Overeating 1-->several days   Retired Feeling Bad about Yourself - or that You are a Failure or Have Let Yourself or Your Family Down 1-->several days   Retired Trouble Concentrating on Things, Such as Reading the Newspaper or Watching Television 1-->several days   Retired Moving or Speaking So Slowly that Other People Could Have Noticed? Or the Opposite - Being So Fidgety 0-->not at all   Retired Thoughts that You Would be Better Off Dead or of Hurting Yourself in Some Way 0-->not at all   Retired PHQ-9: Brief Depression Severity Measure Score 8   Retired If You Checked Off Any Problems, How Difficult Have  These Problems Made It For You to Do Your Work, Take Care of Things at Home, or Get Along with Other People? somewhat difficult     Health Habits and Functional and Cognitive Screening:      10/7/2024     8:12 AM   Functional & Cognitive Status   Do you have difficulty preparing food and eating? No   Do you have difficulty bathing yourself, getting dressed or grooming yourself? No   Do you have difficulty using the toilet? No   Do you have difficulty moving around from place to place? No   Do you have trouble with steps or getting out of a bed or a chair? Yes   Current Diet Low Fat Diet   Dental Exam Not up to date   Eye Exam Up to date   Exercise (times per week) 7 times per week   Current Exercises Include Walking;Swimming   Do you need help using the phone?  No   Are you deaf or do you have serious difficulty hearing?  No   Do you need help to go to places out of walking distance? No   Do you need help shopping? No   Do you need help preparing meals?  No   Do you need help with housework?  No   Do you need help with laundry? No   Do you need help taking your medications? No   Do you need help managing money? No   Do you ever drive or ride in a car without wearing a seat belt? No   Have you felt unusual stress, anger or loneliness in the last month? No   Who do you live with? Spouse   If you need help, do you have trouble finding someone available to you? No   Have you been bothered in the last four weeks by sexual problems? No   Do you have difficulty concentrating, remembering or making decisions? Yes           Age-appropriate Screening Schedule:  Refer to the list below for future screening recommendations based on patient's age, sex and/or medical conditions. Orders for these recommended tests are listed in the plan section. The patient has been provided with a written plan.    Health Maintenance List  Health Maintenance   Topic Date Due    PAP SMEAR  05/04/2024    COVID-19 Vaccine (4 - 2023-24 season)  09/01/2024    TDAP/TD VACCINES (1 - Tdap) 08/09/2025 (Originally 8/9/1979)    DXA SCAN  04/28/2025    ANNUAL WELLNESS VISIT  10/07/2025    LIPID PANEL  10/07/2025    MAMMOGRAM  05/06/2026    GASTROSCOPY (EGD)  12/21/2026    COLORECTAL CANCER SCREENING  12/21/2028    HEPATITIS C SCREENING  Completed    Pneumococcal Vaccine 0-64  Completed    INFLUENZA VACCINE  Completed    ZOSTER VACCINE  Completed                                                                                                                                                CMS Preventative Services Quick Reference  Risk Factors Identified During Encounter  Fall Risk-High or Moderate: Discussed Fall Prevention in the home    The above risks/problems have been discussed with the patient.  Pertinent information has been shared with the patient in the After Visit Summary.  An After Visit Summary and PPPS were made available to the patient.    Follow Up:  Next Medicare Wellness visit to be scheduled in 1 year.          Additional E&M Note during same encounter follows:  Patient has multiple medical problems which are significant and separately identifiable that require additional work above and beyond the Medicare Wellness Visit.      Chief Complaint  Medicare Wellness-subsequent    Panda Rinaldi is a 64 y.o. female who presents to [facility]     History of Present Illness  The patient presents for a wellness visit. She is accompanied by her .    She reports a slight decline in her physical strength compared to the previous year, attributing this to arthritis in her back and hip. She experiences difficulty walking after 30 minutes and struggles with sitting down and standing up due to back pain. She consulted Dr. Zelaya for her back issues.    Her mental health status remains unchanged from last year. She has not required any hospital admissions since last year.    She visited urgent care for sinus issues and  "underwent an endoscopy and colonoscopy.    She manages her heartburn without medication, finding that avoiding eating after 5 or 6 PM significantly alleviates her symptoms.    She does not take baby aspirin and does not require any medication refills at this time. She has a medical power of  in place.    Objective   Vital Signs:   Vitals:    10/07/24 0816   BP: 114/74   Pulse: 59   Temp: 97.9 °F (36.6 °C)   SpO2: 98%   Weight: 58.1 kg (128 lb 1.6 oz)   Height: 160 cm (62.99\")   PainSc:   2       Wt Readings from Last 3 Encounters:   10/07/24 58.1 kg (128 lb 1.6 oz)   09/04/24 57.6 kg (127 lb)   07/30/24 56.9 kg (125 lb 6.4 oz)     BP Readings from Last 3 Encounters:   10/07/24 114/74   07/30/24 120/80   07/09/24 104/70       Physical Exam  Vitals reviewed.   Constitutional:       Appearance: Normal appearance.   HENT:      Right Ear: Tympanic membrane normal.      Left Ear: Tympanic membrane normal.      Nose: Nose normal.   Eyes:      Extraocular Movements: Extraocular movements intact.      Conjunctiva/sclera: Conjunctivae normal.      Pupils: Pupils are equal, round, and reactive to light.   Cardiovascular:      Rate and Rhythm: Normal rate and regular rhythm.   Pulmonary:      Effort: Pulmonary effort is normal.      Breath sounds: Normal breath sounds.   Abdominal:      General: Bowel sounds are normal.   Musculoskeletal:         General: Normal range of motion.      Cervical back: Normal range of motion.   Skin:     General: Skin is warm and dry.   Neurological:      General: No focal deficit present.      Mental Status: She is alert and oriented to person, place, and time.   Psychiatric:         Mood and Affect: Mood normal.         Behavior: Behavior normal.         Physical Exam      The following data was reviewed by Lorri White MD on 10/07/2024  CMP   CMP          1/26/2024    13:55 4/30/2024    08:54 6/12/2024    14:27   CMP   Glucose 107  100  118    BUN 12  12  10    Creatinine 0.70  0.69 "  0.67    EGFR 97.3  97.7  98.4    Sodium 140  141  140    Potassium 5.4  4.1  4.1    Chloride 105  105  103    Calcium 10.2  9.5  9.3    Total Protein 8.2  7.0  7.1    Albumin 4.7  4.5  4.5    Globulin 3.5  2.5  2.6    Total Bilirubin 0.5  0.3  0.4    Alkaline Phosphatase 49  45  43    AST (SGOT) 23  19  17    ALT (SGPT) 21  22  19    Albumin/Globulin Ratio 1.3  1.8  1.7    BUN/Creatinine Ratio 17.1  17.4  14.9    Anion Gap 9.0  10.0  10.4        Results  Laboratory Studies  Vitamin D levels were good. Triglycerides were high. LDL was less than 70.    Radiologic studies DEXA scan    Assessment & Plan   Diagnoses and all orders for this visit:    1. Hyperlipidemia, unspecified hyperlipidemia type (Primary)  -     Lipid Panel    2. Flu vaccine need  -     Fluzone >6mos (0595-3441)    3. Encounter for subsequent annual wellness visit (AWV) in Medicare patient    4. Gastroesophageal reflux disease without esophagitis    5. Age-related osteoporosis without current pathological fracture        Assessment & Plan  1. Osteoarthritis.  She reports worsening back and hip pain, making it difficult to walk for more than 30 minutes and causing discomfort when getting up from a seated position. She is advised to continue her current management strategies and monitor her symptoms.    2. Gastroesophageal Reflux Disease (GERD).  She experiences heartburn and has been managing it by not eating after 5 or 6 PM, which helps reduce symptoms. She is advised to use Tums as needed for heartburn, as it provides additional calcium beneficial for her bone health. She is also advised to avoid omeprazole due to its potential to thin bones.    3. Osteoporosis.  Her vitamin D levels have improved since June 2024, which aids in calcium absorption and bone strengthening. She is advised to continue her Prolia injections and maintain her intake of vitamin D and calcium. She is cautioned to exercise care during winter due to her thin bones and the risk  of slipping on ice.    4. Hypertriglyceridemia.  Her triglycerides were elevated in April 2024. A cholesterol panel will be conducted today to reassess her levels. If triglycerides remain high, niacin may be considered as a treatment option.    5. Health Maintenance.  A cholesterol panel will be conducted today to reassess her lipid levels.         BMI is within normal parameters. No other follow-up for BMI required.       FOLLOW UP  Return in about 3 months (around 1/7/2025).  Patient was given instructions and counseling regarding her condition or for health maintenance advice. Please see specific information pulled into the AVS if appropriate.     Patient or patient representative verbalized consent for the use of Ambient Listening during the visit with  Lorri White MD for chart documentation. 10/23/2024  14:08 EDT    Lorri White MD  10/23/24  14:08 EDT

## 2024-11-05 PROCEDURE — 87086 URINE CULTURE/COLONY COUNT: CPT

## 2024-11-08 ENCOUNTER — OFFICE VISIT (OUTPATIENT)
Dept: FAMILY MEDICINE CLINIC | Facility: CLINIC | Age: 64
End: 2024-11-08
Payer: MEDICARE

## 2024-11-08 VITALS
TEMPERATURE: 97.5 F | HEART RATE: 69 BPM | HEIGHT: 63 IN | WEIGHT: 126.8 LBS | SYSTOLIC BLOOD PRESSURE: 120 MMHG | OXYGEN SATURATION: 99 % | BODY MASS INDEX: 22.47 KG/M2 | DIASTOLIC BLOOD PRESSURE: 60 MMHG

## 2024-11-08 DIAGNOSIS — N10 ACUTE PYELONEPHRITIS: Primary | ICD-10-CM

## 2024-11-08 PROCEDURE — 99213 OFFICE O/P EST LOW 20 MIN: CPT | Performed by: FAMILY MEDICINE

## 2024-11-08 PROCEDURE — 1125F AMNT PAIN NOTED PAIN PRSNT: CPT | Performed by: FAMILY MEDICINE

## 2024-11-08 PROCEDURE — 96372 THER/PROPH/DIAG INJ SC/IM: CPT | Performed by: FAMILY MEDICINE

## 2024-11-08 RX ORDER — CEFTRIAXONE 1 G/1
1 INJECTION, POWDER, FOR SOLUTION INTRAMUSCULAR; INTRAVENOUS ONCE
Status: COMPLETED | OUTPATIENT
Start: 2024-11-08 | End: 2024-11-08

## 2024-11-08 RX ORDER — ERGOCALCIFEROL 1.25 MG/1
CAPSULE, LIQUID FILLED ORAL
COMMUNITY
Start: 2024-11-05

## 2024-11-08 RX ORDER — CIPROFLOXACIN 500 MG/1
500 TABLET, FILM COATED ORAL 2 TIMES DAILY
Qty: 14 TABLET | Refills: 0 | Status: SHIPPED | OUTPATIENT
Start: 2024-11-08 | End: 2024-11-15

## 2024-11-08 RX ORDER — NITROFURANTOIN 25; 75 MG/1; MG/1
100 CAPSULE ORAL 2 TIMES DAILY
Qty: 14 CAPSULE | Refills: 0 | Status: SHIPPED | OUTPATIENT
Start: 2024-11-08 | End: 2024-11-15

## 2024-11-08 RX ORDER — FOLIC ACID 1 MG/1
TABLET ORAL
COMMUNITY
Start: 2024-11-05

## 2024-11-08 RX ADMIN — CEFTRIAXONE 1 G: 1 INJECTION, POWDER, FOR SOLUTION INTRAMUSCULAR; INTRAVENOUS at 15:35

## 2024-11-08 NOTE — PROGRESS NOTES
Chief Complaint  Follow-up (Urgent care/Urine came back abnormal /) and Back Pain    Subjective        Panda Rinaldi presents to [facility]  History of Present Illness    Back Pain- pt reports she has been having low back pain that may be due to UTI.  She was seen at Urgent care and was told her urine culture was abnormal.       Medical History: has a past medical history of Abnormal bone density screening (10/15/2014), Acid reflux, Allergic (10year), Allergic rhinitis, Arthritis (07/15/2021), Colon polyp (2021), Coronary artery disease (2018), Fatty liver (2022), Headache (06/01/1993), Heart disease, Hyperlipemia, Osteopenia, Osteoporosis (10/15/2014), Palpitations, Seasonal allergies, and Shoulder pain.   Surgical History: has a past surgical history that includes Colonoscopy (10/2015); Shoulder surgery (Right, 2014); Esophagogastroduodenoscopy (N/A, 07/15/2021); Colonoscopy (N/A, 07/15/2021); Upper gastrointestinal endoscopy; Esophagogastroduodenoscopy (N/A, 12/21/2023); and Colonoscopy (N/A, 12/21/2023).   Family History: family history includes Arthritis in her mother; Breast cancer in her sister and another family member; Cancer in her mother; Colon cancer (age of onset: 65) in her mother; Heart attack in her mother; Heart disease in her father; Stroke in an other family member.   Social History: reports that she quit smoking about 38 years ago. Her smoking use included cigarettes. She started smoking about 44 years ago. She has a 1.3 pack-year smoking history. She has been exposed to tobacco smoke. She has never used smokeless tobacco. She reports that she does not currently use alcohol. She reports that she does not use drugs.  Immunization History   Administered Date(s) Administered    COVID-19 (PFIZER) Purple Cap Monovalent 03/16/2021, 04/06/2021, 12/21/2021    Fluzone  >6mos 10/07/2024    Fluzone (or Fluarix & Flulaval for VFC) >6mos 10/13/2022, 01/26/2024    Fluzone Quad  ">6mos (Multi-dose) 10/01/2020    Influenza, Unspecified 10/01/2020    Pneumococcal Conjugate 20-Valent (PCV20) 01/26/2024    Shingrix 05/25/2021, 09/08/2021       Objective   Vital Signs:  /60 (BP Location: Right arm, Patient Position: Sitting, Cuff Size: Adult)   Pulse 69   Temp 97.5 °F (36.4 °C) (Oral)   Ht 160 cm (62.99\")   Wt 57.5 kg (126 lb 12.8 oz)   SpO2 99%   BMI 22.47 kg/m²   Estimated body mass index is 22.47 kg/m² as calculated from the following:    Height as of this encounter: 160 cm (62.99\").    Weight as of this encounter: 57.5 kg (126 lb 12.8 oz).     BMI is within normal parameters. No other follow-up for BMI required.      ROS:  Review of Systems   Constitutional:  Negative for fatigue and fever.   HENT:  Negative for congestion, ear pain and sinus pressure.    Respiratory:  Negative for cough, chest tightness and shortness of breath.    Cardiovascular:  Negative for chest pain, palpitations and leg swelling.   Gastrointestinal:  Negative for abdominal pain and diarrhea.   Genitourinary:  Negative for dysuria and frequency.   Neurological:  Negative for speech difficulty, headache and confusion.   Psychiatric/Behavioral:  Negative for agitation and behavioral problems.       Physical Exam  Vitals reviewed.       Physical Exam        Result Review     The following data was reviewed by: Lorri White MD on 11/08/2024:  Common labs          4/30/2024    08:54 6/12/2024    14:27 10/7/2024    10:25   Common Labs   Glucose 100  118     BUN 12  10     Creatinine 0.69  0.67     Sodium 141  140     Potassium 4.1  4.1     Chloride 105  103     Calcium 9.5  9.3     Albumin 4.5  4.5     Total Bilirubin 0.3  0.4     Alkaline Phosphatase 45  43     AST (SGOT) 19  17     ALT (SGPT) 22  19     Total Cholesterol 141   135    Triglycerides 236   160    HDL Cholesterol 34   41    LDL Cholesterol  68   67      Results               Assessment and Plan     Diagnoses and all orders for this visit:    1. " Acute pyelonephritis (Primary)  -     nitrofurantoin, macrocrystal-monohydrate, (Macrobid) 100 MG capsule; Take 1 capsule by mouth 2 (Two) Times a Day for 7 days.  Dispense: 14 capsule; Refill: 0  -     cefTRIAXone (ROCEPHIN) injection 1 g  -     ciprofloxacin (Cipro) 500 MG tablet; Take 1 tablet by mouth 2 (Two) Times a Day for 7 days.  Dispense: 14 tablet; Refill: 0      Assessment & Plan         I spent 15 minutes caring for Tae on this date of service. This time includes time spent by me in the following activities:preparing for the visit  Follow Up   Return if symptoms worsen or fail to improve.  Patient was given instructions and counseling regarding her condition or for health maintenance advice. Please see specific information pulled into the AVS if appropriate.   Patient or patient representative verbalized consent for the use of Ambient Listening during the visit with  Lorri White MD for chart documentation. 11/26/2024  12:21 HOLLY White MD

## 2024-11-18 ENCOUNTER — OFFICE VISIT (OUTPATIENT)
Dept: FAMILY MEDICINE CLINIC | Facility: CLINIC | Age: 64
End: 2024-11-18
Payer: MEDICARE

## 2024-11-18 VITALS
HEART RATE: 73 BPM | SYSTOLIC BLOOD PRESSURE: 122 MMHG | WEIGHT: 126.7 LBS | DIASTOLIC BLOOD PRESSURE: 76 MMHG | TEMPERATURE: 97.9 F | BODY MASS INDEX: 22.45 KG/M2 | OXYGEN SATURATION: 99 % | HEIGHT: 63 IN

## 2024-11-18 DIAGNOSIS — M54.9 BACK PAIN, UNSPECIFIED BACK LOCATION, UNSPECIFIED BACK PAIN LATERALITY, UNSPECIFIED CHRONICITY: ICD-10-CM

## 2024-11-18 DIAGNOSIS — Z09 FOLLOW-UP EXAM: Primary | ICD-10-CM

## 2024-11-18 LAB
BILIRUB BLD-MCNC: NEGATIVE MG/DL
CLARITY, POC: CLEAR
COLOR UR: YELLOW
GLUCOSE UR STRIP-MCNC: NEGATIVE MG/DL
KETONES UR QL: NEGATIVE
LEUKOCYTE EST, POC: NEGATIVE
NITRITE UR-MCNC: NEGATIVE MG/ML
PH UR: 6.5 [PH] (ref 5–8)
PROT UR STRIP-MCNC: NEGATIVE MG/DL
RBC # UR STRIP: NEGATIVE /UL
SP GR UR: 1.02 (ref 1–1.03)
UROBILINOGEN UR QL: NORMAL

## 2024-11-18 RX ORDER — METHOCARBAMOL 500 MG/1
500 TABLET, FILM COATED ORAL 4 TIMES DAILY
Qty: 90 TABLET | Refills: 0 | Status: CANCELLED | OUTPATIENT
Start: 2024-11-18

## 2024-11-18 NOTE — PROGRESS NOTES
Chief Complaint  Urinary Tract Infection (F/U), Back Pain, Fatigue, and Pain (Left leg)    Subjective        Medical History: has a past medical history of Abnormal bone density screening (10/15/2014), Acid reflux, Allergic (10year), Allergic rhinitis, Arthritis (07/15/2021), Colon polyp (2021), Coronary artery disease (2018), Fatty liver (2022), Headache (06/01/1993), Heart disease, Hyperlipemia, Osteopenia, Osteoporosis (10/15/2014), Palpitations, Seasonal allergies, and Shoulder pain.     Surgical History: has a past surgical history that includes Colonoscopy (10/2015); Shoulder surgery (Right, 2014); Esophagogastroduodenoscopy (N/A, 07/15/2021); Colonoscopy (N/A, 07/15/2021); Upper gastrointestinal endoscopy; Esophagogastroduodenoscopy (N/A, 12/21/2023); and Colonoscopy (N/A, 12/21/2023).     Family History: family history includes Arthritis in her mother; Breast cancer in her sister and another family member; Cancer in her mother; Colon cancer (age of onset: 65) in her mother; Heart attack in her mother; Heart disease in her father; Stroke in an other family member.     Social History: reports that she quit smoking about 38 years ago. Her smoking use included cigarettes. She started smoking about 44 years ago. She has a 1.3 pack-year smoking history. She has been exposed to tobacco smoke. She has never used smokeless tobacco. She reports that she does not currently use alcohol. She reports that she does not use drugs.    Panda Rinaldi presents to [facility]    History of Present Illness    History of Present Illness  The patient presents for evaluation of back pain. She is accompanied by an adult male.    She continues to experience back pain, which she attributes to a pulled muscle. She reports sensitivity in her back when touched but is not experiencing spasms. Despite trying lidocaine patches, she found little relief. She received a pain management injection in her back five  "years ago.    Her condition has deteriorated over the past three weeks, leaving her largely sedentary. She maintains an active lifestyle, including yoga before bed, swimming every other day, and walking 2.5 to 4 miles three times a day. However, she had to pause these activities for two weeks due to illness.    She also reports a urinary tract infection (UTI) that has left her feeling nauseous and fatigued, limiting her ability to stand for more than 30 minutes. She has a history of stomach issues and has previously taken ibuprofen for pain relief. She makes an effort to stay hydrated.      Patient is a 64-year-old female who comes in for follow-up.  Patient was seen in urgent care 11/5/2020 for, at that time she was diagnosed with low back pain, x-ray was done that showed Degenerative changes, no significant changes from prior MRI exam.  Patient was given diclofenac sodium and discharged home, urinalysis was also done that showed leukocytes but urine culture was completed instead and no treatment have been done.  Urine culture did show gram-negative colonization less than 10,000.  Patient came back to see Dr. White 11/8/2024, she was given a gram of Rocephin and Macrobid, she comes back today for follow-up.  Urinalysis in office today is clean does not show any infection negative leukocytes and nitrates.  Patient still complaining of low back pain.    Objective   Vital Signs:   /76   Pulse 73   Temp 97.9 °F (36.6 °C)   Ht 160 cm (63\")   Wt 57.5 kg (126 lb 11.2 oz)   SpO2 99%   BMI 22.44 kg/m²       Wt Readings from Last 3 Encounters:   11/18/24 57.5 kg (126 lb 11.2 oz)   11/08/24 57.5 kg (126 lb 12.8 oz)   11/05/24 58.1 kg (128 lb)        BP Readings from Last 3 Encounters:   11/18/24 122/76   11/08/24 120/60   11/05/24 106/74        BMI is within normal parameters. No other follow-up for BMI required.       Physical Exam  Vitals reviewed.   Constitutional:       Appearance: Normal appearance. She is " well-developed.   HENT:      Head: Normocephalic and atraumatic.      Mouth/Throat:      Pharynx: No oropharyngeal exudate.   Cardiovascular:      Rate and Rhythm: Normal rate and regular rhythm.      Heart sounds: No murmur heard.  Pulmonary:      Effort: Pulmonary effort is normal.      Breath sounds: Normal breath sounds. No wheezing.   Musculoskeletal:      Lumbar back: Spasms and tenderness present. No bony tenderness.   Skin:     General: Skin is warm and dry.   Neurological:      Mental Status: She is alert and oriented to person, place, and time.   Psychiatric:         Mood and Affect: Mood and affect normal.         Behavior: Behavior normal.         Thought Content: Thought content normal.         Judgment: Judgment normal.        Result Review :  {The following data was reviewed by MADELYN Roberts on 11/18/2024.  Common labs          4/30/2024    08:54 6/12/2024    14:27 10/7/2024    10:25   Common Labs   Glucose 100  118     BUN 12  10     Creatinine 0.69  0.67     Sodium 141  140     Potassium 4.1  4.1     Chloride 105  103     Calcium 9.5  9.3     Albumin 4.5  4.5     Total Bilirubin 0.3  0.4     Alkaline Phosphatase 45  43     AST (SGOT) 19  17     ALT (SGPT) 22  19     Total Cholesterol 141   135    Triglycerides 236   160    HDL Cholesterol 34   41    LDL Cholesterol  68   67      Data reviewed : Recent urgent care note and resend urine culture             Current Outpatient Medications on File Prior to Visit   Medication Sig Dispense Refill    Acetaminophen 325 MG capsule       cetirizine (zyrTEC) 10 MG tablet Take 1 tablet by mouth Daily. 90 tablet 1    cyanocobalamin (VITAMIN B-12) 500 MCG tablet Take 1 tablet by mouth Daily.      Diclofenac Sodium (VOLTAREN) 1 % gel gel Apply 4 g topically to the appropriate area as directed 4 (Four) Times a Day for 30 days. 100 g 0    folic acid (FOLVITE) 1 MG tablet       metoprolol succinate XL (TOPROL-XL) 25 MG 24 hr tablet Take 1 tablet by mouth  2 (Two) Times a Day. 60 tablet 3    rosuvastatin (Crestor) 5 MG tablet Take 1 tablet by mouth Daily for 90 days. 90 tablet 1    tretinoin (RETIN-A) 0.05 % cream       Turmeric (QC Tumeric Complex) 500 MG capsule Take 1 capsule by mouth Daily.      vitamin D (ERGOCALCIFEROL) 1.25 MG (23088 UT) capsule capsule        Current Facility-Administered Medications on File Prior to Visit   Medication Dose Route Frequency Provider Last Rate Last Admin    denosumab (PROLIA) syringe 60 mg  60 mg Subcutaneous Once Lorri White MD          Urine Culture <10,000 CFU/mL Gram Negative Bacilli Abnormal       Call if further workup needed.    Colonization of the urinary tract without infection is common. Treatment is discouraged unless the patient is symptomatic, pregnant, or undergoing an invasive urologic procedure.        Resulting Agency:  [facility]       Brief Urine Lab Results  (Last result in the past 365 days)        Color   Clarity   Blood   Leuk Est   Nitrite   Protein   CREAT   Urine HCG        11/18/24 1512 Yellow   Clear   Negative   Negative   Negative   Negative                  Assessment and Plan  Diagnoses and all orders for this visit:    1. Follow-up exam (Primary)    2. Back pain, unspecified back location, unspecified back pain laterality, unspecified chronicity  -     POCT urinalysis dipstick, manual  -     Ambulatory Referral to Physical Therapy for Evaluation & Treatment      Brief Urine Lab Results  (Last result in the past 365 days)        Color   Clarity   Blood   Leuk Est   Nitrite   Protein   CREAT   Urine HCG        11/18/24 1512 Yellow   Clear   Negative   Negative   Negative   Negative                  Assessment & Plan  1. Back pain.  Degenerative changes in her back are likely contributing to her discomfort. Her kidney function is within normal limits and her urine is clear. An MRI revealed typical wear and tear of the spine. A prescription for Flexeril 10 mg was provided, with instructions to  take one tablet during the day and two at bedtime. She was also referred to physical therapy. If the muscle relaxer and physical therapy are not effective, a referral to pain management will be considered.        Follow Up   Return for If symptoms do not improve new concerning symptoms.  Patient was given instructions and counseling regarding her condition or for health maintenance advice. Please see specific information pulled into the AVS if appropriate.       Part of this note may be electronic transcription/translation of spoken language to printed text using the Dragon dictation system    Patient or patient representative verbalized consent for the use of Ambient Listening during the visit with  MADELYN Roberts for chart documentation. 11/21/2024  07:04 EST

## 2024-11-19 ENCOUNTER — TELEPHONE (OUTPATIENT)
Dept: FAMILY MEDICINE CLINIC | Facility: CLINIC | Age: 64
End: 2024-11-19

## 2024-11-19 NOTE — TELEPHONE ENCOUNTER
Caller: FATEMEH SOLORZANO    Relationship: Emergency Contact    Best call back number:   Telephone Information:   Mobile [phone]        What medication are you requesting: BACK PAIN MEDICATION     What are your current symptoms: BACK PAIN       If a prescription is needed, what is your preferred pharmacy and phone number: [facility] [place] - [address] [phone] [facility] [phone]      Additional notes:PATIENTS SPOUSE STATES YESTERDAY THEY SAW [facility] AND SHE SAID SHE WOULD SEND IN BACK PAIN MEDICATION AND THE PHARMACY HAS NOT RECEIVED THAT

## 2024-11-20 ENCOUNTER — TELEPHONE (OUTPATIENT)
Dept: FAMILY MEDICINE CLINIC | Facility: CLINIC | Age: 64
End: 2024-11-20
Payer: MEDICARE

## 2024-11-20 RX ORDER — CYCLOBENZAPRINE HCL 10 MG
10 TABLET ORAL NIGHTLY PRN
Qty: 90 TABLET | Refills: 0 | Status: SHIPPED | OUTPATIENT
Start: 2024-11-20

## 2024-11-20 NOTE — TELEPHONE ENCOUNTER
Pt has put in a request for medications and they havent been sent to [facility] yet. Can you please check on this. Said it is for pain for her lower back but not sure of the name. Please call pt with a follow up.

## 2024-11-22 NOTE — TELEPHONE ENCOUNTER
[facility] [employer] message sent to patient asking for clarification if they were able to  the medication that was sent to the pharmacy by [facility] after her visit.

## 2024-12-16 ENCOUNTER — HOSPITAL ENCOUNTER (OUTPATIENT)
Dept: INFUSION THERAPY | Facility: HOSPITAL | Age: 64
Discharge: HOME | End: 2024-12-16
Admitting: FAMILY MEDICINE
Payer: MEDICARE

## 2024-12-16 VITALS
OXYGEN SATURATION: 98 % | HEIGHT: 63 IN | DIASTOLIC BLOOD PRESSURE: 71 MMHG | RESPIRATION RATE: 20 BRPM | BODY MASS INDEX: 22.34 KG/M2 | SYSTOLIC BLOOD PRESSURE: 127 MMHG | TEMPERATURE: 97.7 F | HEART RATE: 63 BPM | WEIGHT: 126.1 LBS

## 2024-12-16 DIAGNOSIS — M81.0 AGE-RELATED OSTEOPOROSIS WITHOUT CURRENT PATHOLOGICAL FRACTURE: Primary | ICD-10-CM

## 2024-12-16 LAB
25(OH)D3 SERPL-MCNC: 36.3 NG/ML (ref 30–100)
ALBUMIN SERPL-MCNC: 4.7 G/DL (ref 3.5–5.2)
ALBUMIN/GLOB SERPL: 1.6 G/DL
ALP SERPL-CCNC: 51 U/L (ref 39–117)
ALT SERPL W P-5'-P-CCNC: 20 U/L (ref 1–33)
ANION GAP SERPL CALCULATED.3IONS-SCNC: 9.4 MMOL/L (ref 5–15)
AST SERPL-CCNC: 22 U/L (ref 1–32)
BILIRUB SERPL-MCNC: 0.4 MG/DL (ref 0–1.2)
BUN SERPL-MCNC: 10 MG/DL (ref 8–23)
BUN/CREAT SERPL: 13.5 (ref 7–25)
CALCIUM SPEC-SCNC: 9.9 MG/DL (ref 8.6–10.5)
CHLORIDE SERPL-SCNC: 105 MMOL/L (ref 98–107)
CO2 SERPL-SCNC: 26.6 MMOL/L (ref 22–29)
CREAT SERPL-MCNC: 0.74 MG/DL (ref 0.57–1)
EGFRCR SERPLBLD CKD-EPI 2021: 90.5 ML/MIN/1.73
GLOBULIN UR ELPH-MCNC: 3 GM/DL
GLUCOSE SERPL-MCNC: 104 MG/DL (ref 65–99)
MAGNESIUM SERPL-MCNC: 2.3 MG/DL (ref 1.6–2.4)
PHOSPHATE SERPL-MCNC: 4 MG/DL (ref 2.5–4.5)
POTASSIUM SERPL-SCNC: 4.3 MMOL/L (ref 3.5–5.2)
PROT SERPL-MCNC: 7.7 G/DL (ref 6–8.5)
SODIUM SERPL-SCNC: 141 MMOL/L (ref 136–145)

## 2024-12-16 PROCEDURE — 82306 VITAMIN D 25 HYDROXY: CPT | Performed by: FAMILY MEDICINE

## 2024-12-16 PROCEDURE — 25010000002 DENOSUMAB 60 MG/ML SOLUTION PREFILLED SYRINGE: Performed by: FAMILY MEDICINE

## 2024-12-16 PROCEDURE — 84100 ASSAY OF PHOSPHORUS: CPT | Performed by: FAMILY MEDICINE

## 2024-12-16 PROCEDURE — 80053 COMPREHEN METABOLIC PANEL: CPT | Performed by: FAMILY MEDICINE

## 2024-12-16 PROCEDURE — 96372 THER/PROPH/DIAG INJ SC/IM: CPT

## 2024-12-16 PROCEDURE — 83735 ASSAY OF MAGNESIUM: CPT | Performed by: FAMILY MEDICINE

## 2024-12-16 PROCEDURE — 36415 COLL VENOUS BLD VENIPUNCTURE: CPT

## 2024-12-16 RX ADMIN — DENOSUMAB 60 MG: 60 INJECTION SUBCUTANEOUS at 13:24

## 2025-01-03 ENCOUNTER — OFFICE VISIT (OUTPATIENT)
Dept: CARDIOLOGY | Facility: CLINIC | Age: 65
End: 2025-01-03
Payer: MEDICARE

## 2025-01-03 VITALS
HEIGHT: 63 IN | HEART RATE: 61 BPM | BODY MASS INDEX: 22.5 KG/M2 | WEIGHT: 127 LBS | DIASTOLIC BLOOD PRESSURE: 73 MMHG | SYSTOLIC BLOOD PRESSURE: 119 MMHG

## 2025-01-03 DIAGNOSIS — R00.2 PALPITATIONS: ICD-10-CM

## 2025-01-03 DIAGNOSIS — I49.1 PREMATURE ATRIAL CONTRACTIONS: Primary | ICD-10-CM

## 2025-01-03 DIAGNOSIS — E78.2 MIXED DYSLIPIDEMIA: ICD-10-CM

## 2025-01-03 PROCEDURE — 1160F RVW MEDS BY RX/DR IN RCRD: CPT

## 2025-01-03 PROCEDURE — 1159F MED LIST DOCD IN RCRD: CPT

## 2025-01-03 PROCEDURE — 99213 OFFICE O/P EST LOW 20 MIN: CPT

## 2025-01-03 NOTE — ASSESSMENT & PLAN NOTE
Continue metoprolol.  I did consider increasing the dose but her blood pressure and heart rate I fear do not allow for it.  For now her palpitations seem to be under good control.

## 2025-01-03 NOTE — PROGRESS NOTES
Chief Complaint  Hyperlipidemia and Follow-up (6 mo f/u. )    Subjective        History of Present Illness  Panda Rinaldi presents to [facility] for follow up.   Patient is a 64-year-old female who presents for follow-up on palpitations.  She reports improvement in her palpitations since increasing the metoprolol.  She did have 1 episode where she was having palpitations at night and was not sure what was causing such intense palpitations.  They resolved on their own.  She has no other complaints today.  She denies chest pain or discomfort.  She denies any dyspnea.  Past Medical History:   Diagnosis Date    Abnormal bone density screening 10/15/2014    osteoporosis- tried fosamax in the past    Acid reflux     Allergic 10year    Allergic rhinitis     Arthritis 07/15/2021    left hip pain. risk for falls.     Colon polyp 2021    Coronary artery disease 2018    Fatty liver 2022    Headache 06/01/1993    Heart disease     Hyperlipemia     Osteopenia     Osteoporosis 10/15/2014    Palpitations     Seasonal allergies     Shoulder pain        ALLERGY  Allergies   Allergen Reactions    Codeine Unknown - High Severity    Hydrocodone-Acetaminophen Shortness Of Breath    Tramadol Shortness Of Breath    Hydrocodone Nausea And Vomiting    Azithromycin Unknown - Low Severity    Kenalog [Triamcinolone Acetonide] Rash    Latex Unknown - Low Severity        Past Surgical History:   Procedure Laterality Date    COLONOSCOPY  10/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     COLONOSCOPY N/A 07/15/2021    Procedure: COLONOSCOPY with polypectomy/snare;  Surgeon: Adelfo Story MD;  Location: [facility];  Service: General;  Laterality: N/A;  colon polyps    COLONOSCOPY N/A 12/21/2023    Procedure: COLONOSCOPY with cold snare;  Surgeon: Adelfo Story MD;  Location: [facility] ENDOSCOPY;  Service: General;  Laterality: N/A;  colon polyp    ENDOSCOPY N/A 07/15/2021    Procedure: ESOPHAGOGASTRODUODENOSCOPY with biopsies;   Surgeon: Adelfo Story MD;  Location: [facility] ENDOSCOPY;  Service: General;  Laterality: N/A;  gastric nodule    ENDOSCOPY N/A 2023    Procedure: ESOPHAGOGASTRODUODENOSCOPY with biopsies;  Surgeon: Adelfo Story MD;  Location: [facility] ENDOSCOPY;  Service: General;  Laterality: N/A;  normal    SHOULDER SURGERY Right     [facility]     UPPER GASTROINTESTINAL ENDOSCOPY          Social History     Socioeconomic History    Marital status:    Tobacco Use    Smoking status: Former     Current packs/day: 0.00     Average packs/day: 0.3 packs/day for 5.4 years (1.3 ttl pk-yrs)     Types: Cigarettes     Start date: 1980     Quit date: 1985     Years since quittin.0     Passive exposure: Past    Smokeless tobacco: Never   Vaping Use    Vaping status: Never Used   Substance and Sexual Activity    Alcohol use: Not Currently     Comment: sometimes we have wine on holidays, usually 1-2 glasses    Drug use: Never    Sexual activity: Not Currently     Partners: Male     Birth control/protection: Vasectomy     Comment: Not sexually active in years.       Family History   Problem Relation Age of Onset    Heart attack Mother     Colon cancer Mother 65    Arthritis Mother     Cancer Mother     Heart disease Father     Breast cancer Sister     Stroke Other     Breast cancer Other     Malig Hyperthermia Neg Hx         Current Outpatient Medications on File Prior to Visit   Medication Sig    Acetaminophen 325 MG capsule     cetirizine (zyrTEC) 10 MG tablet Take 1 tablet by mouth Daily.    cyanocobalamin (VITAMIN B-12) 500 MCG tablet Take 1 tablet by mouth Daily.    cyclobenzaprine (FLEXERIL) 10 MG tablet Take 1 tablet by mouth At Night As Needed for Muscle Spasms.    folic acid (FOLVITE) 1 MG tablet     metoprolol succinate XL (TOPROL-XL) 25 MG 24 hr tablet Take 1 tablet by mouth 2 (Two) Times a Day.    rosuvastatin (Crestor) 5 MG tablet Take 1 tablet by mouth Daily for 90 days.    tretinoin (RETIN-A) 0.05  "% cream     Turmeric (QC Tumeric Complex) 500 MG capsule Take 1 capsule by mouth Daily.    vitamin D (ERGOCALCIFEROL) 1.25 MG (80832 UT) capsule capsule      Current Facility-Administered Medications on File Prior to Visit   Medication    denosumab (PROLIA) syringe 60 mg       Objective   Vitals:    01/03/25 1419   BP: 119/73   Pulse: 61   Weight: 57.6 kg (127 lb)   Height: 160 cm (63\")       Physical Exam  Constitutional:       General: She is awake. She is not in acute distress.     Appearance: Normal appearance.   HENT:      Head: Normocephalic.      Nose: Nose normal. No congestion.   Eyes:      Extraocular Movements: Extraocular movements intact.      Conjunctiva/sclera: Conjunctivae normal.      Pupils: Pupils are equal, round, and reactive to light.   Neck:      Thyroid: No thyromegaly.      Vascular: No JVD.   Cardiovascular:      Rate and Rhythm: Normal rate and regular rhythm.      Chest Wall: PMI is not displaced.      Pulses: Normal pulses.      Heart sounds: Normal heart sounds, S1 normal and S2 normal. No murmur heard.     No friction rub. No gallop. No S3 or S4 sounds.   Pulmonary:      Effort: Pulmonary effort is normal.      Breath sounds: Normal breath sounds. No wheezing, rhonchi or rales.   Abdominal:      General: Bowel sounds are normal.      Palpations: Abdomen is soft.      Tenderness: There is no abdominal tenderness.   Musculoskeletal:      Cervical back: No tenderness.      Right lower leg: No edema.      Left lower leg: No edema.   Lymphadenopathy:      Cervical: No cervical adenopathy.   Skin:     General: Skin is warm and dry.      Capillary Refill: Capillary refill takes less than 2 seconds.      Coloration: Skin is not cyanotic.      Findings: No petechiae or rash.      Nails: There is no clubbing.   Neurological:      Mental Status: She is alert.   Psychiatric:         Mood and Affect: Mood normal.         Behavior: Behavior is cooperative.           Result Review     The following " data was reviewed by MADELYN Deleon on 01/03/25.      CMP          4/30/2024    08:54 6/12/2024    14:27 12/16/2024    12:41   CMP   Glucose 100  118  104    BUN 12  10  10    Creatinine 0.69  0.67  0.74    EGFR 97.7  98.4  90.5    Sodium 141  140  141    Potassium 4.1  4.1  4.3    Chloride 105  103  105    Calcium 9.5  9.3  9.9    Total Protein 7.0  7.1  7.7    Albumin 4.5  4.5  4.7    Globulin 2.5  2.6  3.0    Total Bilirubin 0.3  0.4  0.4    Alkaline Phosphatase 45  43  51    AST (SGOT) 19  17  22    ALT (SGPT) 22  19  20    Albumin/Globulin Ratio 1.8  1.7  1.6    BUN/Creatinine Ratio 17.4  14.9  13.5    Anion Gap 10.0  10.4  9.4      CBC w/diff          2/7/2024    10:00   CBC w/Diff   WBC 5.60    RBC 4.42    Hemoglobin 13.0    Hematocrit 39.6    MCV 89.6    MCH 29.4    MCHC 32.8    RDW 12.1    Platelets 185    Neutrophil Rel % 64.9    Immature Granulocyte Rel % 0.2    Lymphocyte Rel % 23.0    Monocyte Rel % 8.9    Eosinophil Rel % 2.5    Basophil Rel % 0.5       Lipid Panel          1/26/2024    13:55 4/30/2024    08:54 10/7/2024    10:25   Lipid Panel   Total Cholesterol 199  141  135    Triglycerides 169  236  160    HDL Cholesterol 41  34  41    VLDL Cholesterol 30  39  27    LDL Cholesterol  128  68  67    LDL/HDL Ratio 3.03  1.76  1.51        Results for orders placed during the hospital encounter of 06/20/24    Adult Transthoracic Echo Complete W/ Cont if Necessary Per Protocol    Interpretation Summary    Left ventricular systolic function is normal. Calculated left ventricular EF = 66.4%    Left ventricular wall thickness is consistent with borderline concentric hypertrophy.    Left ventricular diastolic function is consistent with (grade I) impaired relaxation and age.    Estimated right ventricular systolic pressure from tricuspid regurgitation is normal (<35 mmHg).    Results for orders placed during the hospital encounter of 06/20/24    Treadmill Stress Test    Interpretation  Summary  Reduced functional capacity due to joint pain.  Maximum workload achieved was 5.1 METS.  Study was terminated at 55% of the maximum predicted heart rate due to arthritis/left hip pain.  No significant arrhythmias were noted.  Baseline ECG showed normal sinus rhythm.  At peak stress, no ischemic ST-T changes were noted.  Study is indeterminate due to inability to achieve target heart rate.  Consider pharmacological stress test.    === Results for orders placed in visit on 05/15/24 ===    HOLTER MONITOR - 72 HOUR UP TO 15 DAY    - Interpretation Summary -  Baseline rhythm is sinus with an average heart rate of 71 bpm.  No significant bradycardia, pauses or AV block were noted.  Rare PACs were seen, mostly in isolated form.  Rare isolated PVCs were noted.  Patient's triggered events correlated with normal sinus rhythm/sinus rhythm with ectopy.  Overall, study is benign.          Procedures      Assessment & Plan  Premature atrial contractions  Continue metoprolol.  Palpitations  Continue metoprolol.  I did consider increasing the dose but her blood pressure and heart rate I fear do not allow for it.  For now her palpitations seem to be under good control.  Mixed dyslipidemia  Continue statin therapy.                     Follow Up   Return in about 1 year (around 1/3/2026) for With Dr. Doherty.    Patient was given instructions and counseling regarding her condition or for health maintenance advice. Please see specific information pulled into the AVS if appropriate.     Becca Lockett, APRN  01/03/25  14:30 EST    Dictated Utilizing Dragon Dictation

## 2025-01-09 ENCOUNTER — OFFICE VISIT (OUTPATIENT)
Dept: FAMILY MEDICINE CLINIC | Facility: CLINIC | Age: 65
End: 2025-01-09
Payer: MEDICARE

## 2025-01-09 VITALS
SYSTOLIC BLOOD PRESSURE: 112 MMHG | TEMPERATURE: 97.8 F | HEIGHT: 63 IN | OXYGEN SATURATION: 98 % | DIASTOLIC BLOOD PRESSURE: 62 MMHG | WEIGHT: 127 LBS | HEART RATE: 62 BPM | BODY MASS INDEX: 22.5 KG/M2

## 2025-01-09 DIAGNOSIS — M79.645 FINGER PAIN, LEFT: Primary | ICD-10-CM

## 2025-01-09 DIAGNOSIS — Z12.31 BREAST CANCER SCREENING BY MAMMOGRAM: ICD-10-CM

## 2025-01-09 DIAGNOSIS — M54.42 CHRONIC MIDLINE LOW BACK PAIN WITH LEFT-SIDED SCIATICA: ICD-10-CM

## 2025-01-09 DIAGNOSIS — M65.342 TRIGGER RING FINGER OF LEFT HAND: ICD-10-CM

## 2025-01-09 DIAGNOSIS — G89.29 CHRONIC MIDLINE LOW BACK PAIN WITH LEFT-SIDED SCIATICA: ICD-10-CM

## 2025-01-09 DIAGNOSIS — M81.0 OSTEOPOROSIS, UNSPECIFIED OSTEOPOROSIS TYPE, UNSPECIFIED PATHOLOGICAL FRACTURE PRESENCE: ICD-10-CM

## 2025-01-09 LAB — URATE SERPL-MCNC: 4.3 MG/DL (ref 2.4–5.7)

## 2025-01-09 PROCEDURE — 36415 COLL VENOUS BLD VENIPUNCTURE: CPT | Performed by: FAMILY MEDICINE

## 2025-01-09 PROCEDURE — 99214 OFFICE O/P EST MOD 30 MIN: CPT | Performed by: FAMILY MEDICINE

## 2025-01-09 PROCEDURE — 1125F AMNT PAIN NOTED PAIN PRSNT: CPT | Performed by: FAMILY MEDICINE

## 2025-01-09 PROCEDURE — 84550 ASSAY OF BLOOD/URIC ACID: CPT | Performed by: FAMILY MEDICINE

## 2025-01-09 PROCEDURE — G2211 COMPLEX E/M VISIT ADD ON: HCPCS | Performed by: FAMILY MEDICINE

## 2025-01-09 NOTE — PROGRESS NOTES
Chief Complaint  Arthritis, Left ring finger and pinky when making a fist there is shoo, Back Pain (Going to physical therapy but a friend recommended a back tens unit.), and Med Refill (Rosuvastatin)    Subjective        Panda Rinaldi presents to [facility]  History of Present Illness  The patient presents for evaluation of left hand pain, lower back pain, and health maintenance.    She reports experiencing significant discomfort in her left hand, specifically in the last 2 fingers, which she describes as being sensitive to touch. The pain, characterized as electric shock-like, extends from her elbow to her shoulder. She also notes an inability to flex these fingers. These symptoms have been present for the past 2 days. The onset of these symptoms was sudden, occurring at night, and was initially mistaken for a cardiac event due to the associated arm clenching. She has not experienced any trauma to the hand. Additionally, she reports swelling in her pinky finger, which has been persistent for the past 5 to 6 years. She has a history of arthritis.    She is currently undergoing physical therapy for lower back pain, which radiates down her left leg. She has expressed interest in obtaining a TENS unit for additional pain management.    She received her Prolia injection in December and is scheduled to receive it every 6 months. She reports no side effects from the injection. She had a mammogram in May 2024 and is due for another one in May 2025. She is also due for a bone density test in May 2025.    She needs a refill on her rosuvastatin.    SOCIAL HISTORY  She had wine on New Year's Linnea.    MEDICATIONS  Current: rosuvastatin, Prolia        Medical History: has a past medical history of Abnormal bone density screening (10/15/2014), Acid reflux, Allergic (10year), Allergic rhinitis, Arthritis (07/15/2021), Colon polyp (2021), Coronary artery disease (2018), Fatty liver (2022), Headache  "(06/01/1993), Heart disease, Hyperlipemia, Osteopenia, Osteoporosis (10/15/2014), Palpitations, Seasonal allergies, and Shoulder pain.   Surgical History: has a past surgical history that includes Colonoscopy (10/2015); Shoulder surgery (Right, 2014); Esophagogastroduodenoscopy (N/A, 07/15/2021); Colonoscopy (N/A, 07/15/2021); Upper gastrointestinal endoscopy; Esophagogastroduodenoscopy (N/A, 12/21/2023); and Colonoscopy (N/A, 12/21/2023).   Family History: family history includes Arthritis in her mother; Breast cancer in her sister and another family member; Cancer in her mother; Colon cancer (age of onset: 65) in her mother; Heart attack in her mother; Heart disease in her father; Stroke in an other family member.   Social History: reports that she quit smoking about 39 years ago. Her smoking use included cigarettes. She started smoking about 44 years ago. She has a 1.3 pack-year smoking history. She has been exposed to tobacco smoke. She has never used smokeless tobacco. She reports that she does not currently use alcohol. She reports that she does not use drugs.  Immunization History   Administered Date(s) Administered    COVID-19 (PFIZER) Purple Cap Monovalent 03/16/2021, 04/06/2021, 12/21/2021    Fluzone  >6mos 10/07/2024    Fluzone (or Fluarix & Flulaval for VFC) >6mos 10/13/2022, 01/26/2024    Fluzone Quad >6mos (Multi-dose) 10/01/2020    Influenza, Unspecified 10/01/2020    Pneumococcal Conjugate 20-Valent (PCV20) 01/26/2024    Shingrix 05/25/2021, 09/08/2021       Objective   Vital Signs:  /62   Pulse 62   Temp 97.8 °F (36.6 °C)   Ht 160 cm (63\")   Wt 57.6 kg (127 lb)   SpO2 98%   BMI 22.50 kg/m²   Estimated body mass index is 22.5 kg/m² as calculated from the following:    Height as of this encounter: 160 cm (63\").    Weight as of this encounter: 57.6 kg (127 lb).     BMI is within normal parameters. No other follow-up for BMI required.      ROS:  Review of Systems   Constitutional:  Negative " for fatigue and fever.   HENT:  Negative for congestion, ear pain and sinus pressure.    Respiratory:  Negative for cough, chest tightness and shortness of breath.    Cardiovascular:  Negative for chest pain, palpitations and leg swelling.   Gastrointestinal:  Negative for abdominal pain and diarrhea.   Genitourinary:  Negative for dysuria and frequency.   Neurological:  Negative for speech difficulty, headache and confusion.   Psychiatric/Behavioral:  Negative for agitation and behavioral problems.       Physical Exam  Vitals reviewed.   Constitutional:       Appearance: Normal appearance.   HENT:      Right Ear: Tympanic membrane normal.      Left Ear: Tympanic membrane normal.      Nose: Nose normal.   Eyes:      Extraocular Movements: Extraocular movements intact.      Conjunctiva/sclera: Conjunctivae normal.      Pupils: Pupils are equal, round, and reactive to light.   Cardiovascular:      Rate and Rhythm: Normal rate and regular rhythm.   Pulmonary:      Effort: Pulmonary effort is normal.      Breath sounds: Normal breath sounds.   Abdominal:      General: Bowel sounds are normal.   Musculoskeletal:         General: Normal range of motion.      Cervical back: Normal range of motion.   Skin:     General: Skin is warm and dry.   Neurological:      General: No focal deficit present.      Mental Status: She is alert and oriented to person, place, and time.   Psychiatric:         Mood and Affect: Mood normal.         Behavior: Behavior normal.       Physical Exam  Swelling noted in the left hand.      Result Review     The following data was reviewed by: Lorri White MD on 01/09/2025:  Common labs          10/7/2024    10:25 12/16/2024    12:41 1/9/2025    12:45   Common Labs   Glucose  104     BUN  10     Creatinine  0.74     Sodium  141     Potassium  4.3     Chloride  105     Calcium  9.9     Albumin  4.7     Total Bilirubin  0.4     Alkaline Phosphatase  51     AST (SGOT)  22     ALT (SGPT)  20     Total  Cholesterol 135      Triglycerides 160      HDL Cholesterol 41      LDL Cholesterol  67      Uric Acid   4.3      Results  Laboratory Studies  Cholesterol was 160 in October 2024. Kidney function and vitamin D levels were normal.             Assessment and Plan    Diagnoses and all orders for this visit:    1. Finger pain, left (Primary)  -     Uric Acid  -     Ambulatory Referral to Orthopedic Surgery    2. Chronic midline low back pain with left-sided sciatica    3. Breast cancer screening by mammogram  -     Mammo Screening Digital Tomosynthesis Bilateral With CAD; Future    4. Osteoporosis, unspecified osteoporosis type, unspecified pathological fracture presence  -     Dexa Bone Density, Axial (Every 2 Years); Future    5. Trigger ring finger of left hand  -     Ambulatory Referral to Orthopedic Surgery      Assessment & Plan  1. Potential gout flare-up.  The symptoms suggest a possible gout flare-up, characterized by pain and swelling in the ring finger. A uric acid test will be conducted today to confirm the diagnosis. If confirmed, she will be prescribed colchicine to manage the condition.    2. Trigger finger.  The clicking sensation in the pinky finger is indicative of trigger finger. An orthopedic referral will be made for further evaluation and potential steroid injection. If the injection does not alleviate the symptoms, surgical intervention may be considered.    3. Lower back pain.  She is currently undergoing physical therapy for lower back pain, which radiates down her left leg. A prescription for a TENS unit will be provided to help manage the pain. The TENS unit will be ordered through a company that bills her insurance directly.    4. Health maintenance.  Her cholesterol levels have significantly improved, decreasing from 236 to 160. Her kidney function and vitamin D levels are within normal limits. A refill for rosuvastatin will be provided. Orders for a mammogram and bone density test will be  placed for May 2025. She will continue receiving Prolia injections every 6 months until her bone strength improves.       I spent 35 minutes caring for Tae on this date of service. This time includes time spent by me in the following activities:preparing for the visit and reviewing tests  Follow Up   Return in about 3 months (around 4/9/2025).  Patient was given instructions and counseling regarding her condition or for health maintenance advice. Please see specific information pulled into the AVS if appropriate.   Patient or patient representative verbalized consent for the use of Ambient Listening during the visit with  Lorri White MD for chart documentation. 1/16/2025  21:07 HOLLY White MD

## 2025-01-30 ENCOUNTER — OFFICE VISIT (OUTPATIENT)
Dept: ORTHOPEDIC SURGERY | Facility: CLINIC | Age: 65
End: 2025-01-30
Payer: MEDICARE

## 2025-01-30 VITALS
DIASTOLIC BLOOD PRESSURE: 75 MMHG | SYSTOLIC BLOOD PRESSURE: 112 MMHG | WEIGHT: 129 LBS | BODY MASS INDEX: 22.86 KG/M2 | OXYGEN SATURATION: 98 % | HEIGHT: 63 IN | HEART RATE: 72 BPM

## 2025-01-30 DIAGNOSIS — M65.352 TRIGGER LITTLE FINGER OF LEFT HAND: ICD-10-CM

## 2025-01-30 DIAGNOSIS — M19.042 ARTHRITIS OF FINGER OF LEFT HAND: Primary | ICD-10-CM

## 2025-01-30 RX ORDER — TRIAMCINOLONE ACETONIDE 40 MG/ML
20 INJECTION, SUSPENSION INTRA-ARTICULAR; INTRAMUSCULAR
Status: COMPLETED | OUTPATIENT
Start: 2025-01-30 | End: 2025-01-30

## 2025-01-30 RX ORDER — MELOXICAM 7.5 MG/1
7.5 TABLET ORAL DAILY
Qty: 20 TABLET | Refills: 0 | Status: SHIPPED | OUTPATIENT
Start: 2025-01-30

## 2025-01-30 RX ORDER — LIDOCAINE HYDROCHLORIDE 10 MG/ML
0.5 INJECTION, SOLUTION INFILTRATION; PERINEURAL
Status: COMPLETED | OUTPATIENT
Start: 2025-01-30 | End: 2025-01-30

## 2025-01-30 RX ADMIN — LIDOCAINE HYDROCHLORIDE 0.5 ML: 10 INJECTION, SOLUTION INFILTRATION; PERINEURAL at 15:45

## 2025-01-30 RX ADMIN — TRIAMCINOLONE ACETONIDE 20 MG: 40 INJECTION, SUSPENSION INTRA-ARTICULAR; INTRAMUSCULAR at 15:45

## 2025-02-04 DIAGNOSIS — J30.2 SEASONAL ALLERGIC RHINITIS, UNSPECIFIED TRIGGER: ICD-10-CM

## 2025-02-04 DIAGNOSIS — E78.2 MIXED HYPERLIPIDEMIA: ICD-10-CM

## 2025-02-04 RX ORDER — OMEPRAZOLE 40 MG/1
40 CAPSULE, DELAYED RELEASE ORAL DAILY
Qty: 90 CAPSULE | Refills: 0 | Status: SHIPPED | OUTPATIENT
Start: 2025-02-04

## 2025-02-04 NOTE — TELEPHONE ENCOUNTER
Caller: Panda Rinaldi    Relationship: Self    Best call back number:   Telephone Information:   Mobile [phone]            What was the call regarding:       PATIENT WOULD LIKE TO BE NOTIFIED WHEN MEDICATIONS ARE SENT TO THE PHARMACY

## 2025-02-04 NOTE — TELEPHONE ENCOUNTER
Rx Refill Note  Requested Prescriptions     Pending Prescriptions Disp Refills    cetirizine (zyrTEC) 10 MG tablet 90 tablet 1     Sig: Take 1 tablet by mouth Daily.    rosuvastatin (Crestor) 5 MG tablet 90 tablet 1     Sig: Take 1 tablet by mouth Daily for 90 days.      Last office visit with prescribing clinician: 1/9/2025   Last telemedicine visit with prescribing clinician: Visit date not found   Next office visit with prescribing clinician: 4/10/2025                         Would you like a call back once the refill request has been completed: [] Yes [] No    If the office needs to give you a call back, can they leave a voicemail: [] Yes [] No    Celia Jaffe Rep  02/04/25, 13:52 EST

## 2025-02-05 RX ORDER — CETIRIZINE HYDROCHLORIDE 10 MG/1
10 TABLET ORAL DAILY
Qty: 90 TABLET | Refills: 1 | Status: SHIPPED | OUTPATIENT
Start: 2025-02-05

## 2025-02-05 RX ORDER — ROSUVASTATIN CALCIUM 5 MG/1
5 TABLET, COATED ORAL DAILY
Qty: 90 TABLET | Refills: 1 | Status: SHIPPED | OUTPATIENT
Start: 2025-02-05 | End: 2025-05-06

## 2025-02-17 ENCOUNTER — PATIENT MESSAGE (OUTPATIENT)
Dept: FAMILY MEDICINE CLINIC | Facility: CLINIC | Age: 65
End: 2025-02-17
Payer: MEDICARE

## 2025-03-04 ENCOUNTER — TELEPHONE (OUTPATIENT)
Dept: FAMILY MEDICINE CLINIC | Facility: CLINIC | Age: 65
End: 2025-03-04

## 2025-03-04 ENCOUNTER — PATIENT MESSAGE (OUTPATIENT)
Dept: FAMILY MEDICINE CLINIC | Facility: CLINIC | Age: 65
End: 2025-03-04
Payer: MEDICARE

## 2025-03-04 NOTE — TELEPHONE ENCOUNTER
Patient came in requesting DR. White put in an order for a back brace. Patient states she is having severe back pain and needing to do physical therapy twice a week.

## 2025-04-10 ENCOUNTER — OFFICE VISIT (OUTPATIENT)
Dept: FAMILY MEDICINE CLINIC | Facility: CLINIC | Age: 65
End: 2025-04-10
Payer: MEDICARE

## 2025-04-10 VITALS
DIASTOLIC BLOOD PRESSURE: 58 MMHG | TEMPERATURE: 98.1 F | HEIGHT: 63 IN | HEART RATE: 80 BPM | SYSTOLIC BLOOD PRESSURE: 108 MMHG | BODY MASS INDEX: 22.25 KG/M2 | OXYGEN SATURATION: 97 % | WEIGHT: 125.6 LBS

## 2025-04-10 DIAGNOSIS — M81.0 OSTEOPOROSIS, UNSPECIFIED OSTEOPOROSIS TYPE, UNSPECIFIED PATHOLOGICAL FRACTURE PRESENCE: ICD-10-CM

## 2025-04-10 DIAGNOSIS — I49.3 PREMATURE VENTRICULAR CONTRACTIONS: ICD-10-CM

## 2025-04-10 DIAGNOSIS — J30.2 SEASONAL ALLERGIC RHINITIS, UNSPECIFIED TRIGGER: ICD-10-CM

## 2025-04-10 DIAGNOSIS — R79.89 ABNORMAL CBC: ICD-10-CM

## 2025-04-10 DIAGNOSIS — E78.2 MIXED HYPERLIPIDEMIA: Primary | ICD-10-CM

## 2025-04-10 DIAGNOSIS — F33.1 MAJOR DEPRESSIVE DISORDER, RECURRENT, MODERATE: ICD-10-CM

## 2025-04-10 RX ORDER — SERTRALINE HYDROCHLORIDE 25 MG/1
25 TABLET, FILM COATED ORAL DAILY
Qty: 30 TABLET | Refills: 2 | Status: SHIPPED | OUTPATIENT
Start: 2025-04-10 | End: 2025-05-10

## 2025-04-10 RX ORDER — OMEPRAZOLE 40 MG/1
40 CAPSULE, DELAYED RELEASE ORAL DAILY
Qty: 90 CAPSULE | Refills: 0 | Status: SHIPPED | OUTPATIENT
Start: 2025-04-10

## 2025-04-10 RX ORDER — ROSUVASTATIN CALCIUM 5 MG/1
5 TABLET, COATED ORAL DAILY
Qty: 90 TABLET | Refills: 1 | Status: SHIPPED | OUTPATIENT
Start: 2025-04-10 | End: 2025-07-09

## 2025-04-10 RX ORDER — CETIRIZINE HYDROCHLORIDE 10 MG/1
10 TABLET ORAL DAILY
Qty: 90 TABLET | Refills: 1 | Status: SHIPPED | OUTPATIENT
Start: 2025-04-10

## 2025-04-10 RX ORDER — METOPROLOL SUCCINATE 25 MG/1
25 TABLET, EXTENDED RELEASE ORAL 2 TIMES DAILY
Qty: 60 TABLET | Refills: 3 | Status: SHIPPED | OUTPATIENT
Start: 2025-04-10

## 2025-04-10 NOTE — PROGRESS NOTES
Chief Complaint  Follow-up (Patient states it is a follow up visit requesting lab work. )    Rey Rinaldi presents to [facility]  History of Present Illness  The patient presents for evaluation of depression and health maintenance.    She has been experiencing a persistent low mood, which she attributes to her 's health issues. Despite attempts to engage in activities such as walking and shopping, she finds herself unable to focus or derive pleasure from these pursuits. She also reports difficulty concentrating on her painting, a hobby she previously enjoyed.  She recalls a recent visit to her sister's house as a particularly happy time but notes that her mood deteriorated upon returning home and resuming her caregiving responsibilities for her grandchildren. She describes feeling overwhelmed by her 's health issues and the demands of caring for her grandchildren. She also reports occasional crying spells. She has a history of depression, which had previously improved but has recently resurfaced over the past few months.    She had a consultation with her rheumatologist yesterday, who expressed interest in reviewing her current medication regimen. She has an upcoming appointment with her primary care physician on Monday. Her rheumatologist has requested updated laboratory tests. She has a scheduled mammogram and bone density test next month. She has a past medical history of liver disease, which was last evaluated and deemed stable, negating the need for further follow-up with her hepatologist.        Medical History: has a past medical history of Abnormal bone density screening (10/15/2014), Acid reflux, Allergic (10year), Allergic rhinitis, Arthritis (07/15/2021), Colon polyp (2021), Coronary artery disease (2018), Fatty liver (2022), Headache (06/01/1993), Heart disease, Hyperlipemia, Osteopenia, Osteoporosis (10/15/2014), Palpitations, Seasonal  "allergies, and Shoulder pain.   Surgical History: has a past surgical history that includes Colonoscopy (10/2015); Shoulder surgery (Right, 2014); Esophagogastroduodenoscopy (N/A, 07/15/2021); Colonoscopy (N/A, 07/15/2021); Upper gastrointestinal endoscopy; Esophagogastroduodenoscopy (N/A, 12/21/2023); and Colonoscopy (N/A, 12/21/2023).   Family History: family history includes Arthritis in her mother; Breast cancer in her sister and another family member; Cancer in her mother; Colon cancer (age of onset: 65) in her mother; Heart attack in her mother; Heart disease in her father; Stroke in an other family member.   Social History: reports that she quit smoking about 39 years ago. Her smoking use included cigarettes. She started smoking about 44 years ago. She has a 1.3 pack-year smoking history. She has been exposed to tobacco smoke. She has never used smokeless tobacco. She reports that she does not currently use alcohol. She reports that she does not use drugs.  Immunization History   Administered Date(s) Administered    COVID-19 (PFIZER) Purple Cap Monovalent 03/16/2021, 04/06/2021, 12/21/2021    Fluzone  >6mos 10/07/2024    Fluzone (or Fluarix & Flulaval for VFC) >6mos 10/13/2022, 01/26/2024    Fluzone Quad >6mos (Multi-dose) 10/01/2020    Influenza, Unspecified 10/01/2020    Pneumococcal Conjugate 20-Valent (PCV20) 01/26/2024    Shingrix 05/25/2021, 09/08/2021       Objective   Vital Signs:  /58   Pulse 80   Temp 98.1 °F (36.7 °C) (Oral)   Ht 160 cm (63\")   Wt 57 kg (125 lb 9.6 oz)   SpO2 97%   BMI 22.25 kg/m²   Estimated body mass index is 22.25 kg/m² as calculated from the following:    Height as of this encounter: 160 cm (63\").    Weight as of this encounter: 57 kg (125 lb 9.6 oz).     BMI is within normal parameters. No other follow-up for BMI required.      ROS:  Review of Systems   Constitutional:  Negative for fatigue and fever.   HENT:  Negative for congestion, ear pain and sinus pressure.  "   Respiratory:  Negative for cough, chest tightness and shortness of breath.    Cardiovascular:  Negative for chest pain, palpitations and leg swelling.   Gastrointestinal:  Negative for abdominal pain and diarrhea.   Genitourinary:  Negative for dysuria and frequency.   Neurological:  Negative for speech difficulty, headache and confusion.   Psychiatric/Behavioral:  Negative for agitation and behavioral problems.       Physical Exam  Vitals reviewed.   Constitutional:       Appearance: Normal appearance.   HENT:      Right Ear: Tympanic membrane normal.      Left Ear: Tympanic membrane normal.      Nose: Nose normal.   Eyes:      Extraocular Movements: Extraocular movements intact.      Conjunctiva/sclera: Conjunctivae normal.      Pupils: Pupils are equal, round, and reactive to light.   Cardiovascular:      Rate and Rhythm: Normal rate and regular rhythm.   Pulmonary:      Effort: Pulmonary effort is normal.      Breath sounds: Normal breath sounds.   Abdominal:      General: Bowel sounds are normal.   Musculoskeletal:         General: Normal range of motion.      Cervical back: Normal range of motion.   Skin:     General: Skin is warm and dry.   Neurological:      General: No focal deficit present.      Mental Status: She is alert and oriented to person, place, and time.   Psychiatric:         Mood and Affect: Mood normal.         Behavior: Behavior normal.       Physical Exam        Result Review     The following data was reviewed by: Lorri White MD on 04/10/2025:  Common labs          12/16/2024    12:41 1/9/2025    12:45 4/14/2025    10:03   Common Labs   Glucose 104   92    BUN 10   8    Creatinine 0.74   0.78    Sodium 141   140    Potassium 4.3   4.3    Chloride 105   103    Calcium 9.9   9.7    Albumin 4.7   4.6    Total Bilirubin 0.4   0.6    Alkaline Phosphatase 51   50    AST (SGOT) 22   25    ALT (SGPT) 20   28    WBC   5.77    Hemoglobin   14.5    Hematocrit   42.2    Platelets   207    Total  Cholesterol   182    Triglycerides   271    HDL Cholesterol   42    LDL Cholesterol    94    Uric Acid  4.3       Results  Laboratory Studies  Labs done in December were normal. Vitamin D levels were good. Cholesterol levels were much better than before. Liver function tests were good.    Imaging  Ultrasound of liver showed slight enlargement.             Assessment and Plan     Diagnoses and all orders for this visit:    1. Mixed hyperlipidemia (Primary)  -     Comprehensive Metabolic Panel  -     Lipid Panel  -     rosuvastatin (Crestor) 5 MG tablet; Take 1 tablet by mouth Daily for 90 days.  Dispense: 90 tablet; Refill: 1    2. Osteoporosis, unspecified osteoporosis type, unspecified pathological fracture presence  -     Vitamin D,25-Hydroxy    3. Abnormal CBC  -     CBC Auto Differential    4. Premature ventricular contractions  -     metoprolol succinate XL (TOPROL-XL) 25 MG 24 hr tablet; Take 1 tablet by mouth 2 (Two) Times a Day.  Dispense: 60 tablet; Refill: 3    5. Seasonal allergic rhinitis, unspecified trigger  Comments:  stable on zyrtec 10mg, continue  Orders:  -     cetirizine (zyrTEC) 10 MG tablet; Take 1 tablet by mouth Daily.  Dispense: 90 tablet; Refill: 1    6. Major depressive disorder, recurrent, moderate    Other orders  -     sertraline (Zoloft) 25 MG tablet; Take 1 tablet by mouth Daily for 30 days.  Dispense: 30 tablet; Refill: 2  -     omeprazole (priLOSEC) 40 MG capsule; Take 1 capsule by mouth Daily.  Dispense: 90 capsule; Refill: 0      Assessment & Plan  1. Depression.  She reports feeling down for a couple of months, with difficulty focusing on activities she used to enjoy, such as painting. She is also worried about her 's health, which contributes to her depressive symptoms. A comprehensive discussion was held regarding the nature of depression and the potential benefits of sertraline in managing her symptoms. She was encouraged to engage in activities that bring her marisa and  to express her emotions freely. A prescription for sertraline 50 mg once daily was provided, and she was advised to commence the medication over the weekend.    2. Health Maintenance.  Her cholesterol levels have shown significant improvement since the last evaluation. Previous labs done in December were normal. Vitamin D levels were good. Cholesterol levels were much better than before. Liver function tests were good. An ultrasound of the liver showed slight enlargement. A series of laboratory tests will be conducted, including blood sugar, vitamin D, cholesterol, CBC, thyroid function, and liver function tests. If the liver function tests return abnormal results, an ultrasound may be considered to ensure accurate measurement.    Follow-up  The patient will follow up in 1 month.       I spent 35 minutes caring for Tae on this date of service. This time includes time spent by me in the following activities:reviewing tests  Follow Up   Return in about 4 weeks (around 5/8/2025).  Patient was given instructions and counseling regarding her condition or for health maintenance advice. Please see specific information pulled into the AVS if appropriate.   Patient or patient representative verbalized consent for the use of Ambient Listening during the visit with  Lorri White MD for chart documentation. 4/26/2025  22:54 EDT    Lorri White MD

## 2025-04-14 ENCOUNTER — CLINICAL SUPPORT (OUTPATIENT)
Dept: FAMILY MEDICINE CLINIC | Facility: CLINIC | Age: 65
End: 2025-04-14
Payer: MEDICARE

## 2025-04-14 DIAGNOSIS — K21.9 GASTROESOPHAGEAL REFLUX DISEASE WITHOUT ESOPHAGITIS: Primary | ICD-10-CM

## 2025-04-14 DIAGNOSIS — Z00.00 ANNUAL WELLNESS VISIT: ICD-10-CM

## 2025-04-14 DIAGNOSIS — E78.2 MIXED HYPERLIPIDEMIA: ICD-10-CM

## 2025-04-14 LAB
25(OH)D3 SERPL-MCNC: 47.3 NG/ML (ref 30–100)
ALBUMIN SERPL-MCNC: 4.6 G/DL (ref 3.5–5.2)
ALBUMIN/GLOB SERPL: 1.4 G/DL
ALP SERPL-CCNC: 50 U/L (ref 39–117)
ALT SERPL W P-5'-P-CCNC: 28 U/L (ref 1–33)
ANION GAP SERPL CALCULATED.3IONS-SCNC: 10.6 MMOL/L (ref 5–15)
AST SERPL-CCNC: 25 U/L (ref 1–32)
BASOPHILS # BLD AUTO: 0.04 10*3/MM3 (ref 0–0.2)
BASOPHILS NFR BLD AUTO: 0.7 % (ref 0–1.5)
BILIRUB SERPL-MCNC: 0.6 MG/DL (ref 0–1.2)
BUN SERPL-MCNC: 8 MG/DL (ref 8–23)
BUN/CREAT SERPL: 10.3 (ref 7–25)
CALCIUM SPEC-SCNC: 9.7 MG/DL (ref 8.6–10.5)
CHLORIDE SERPL-SCNC: 103 MMOL/L (ref 98–107)
CHOLEST SERPL-MCNC: 182 MG/DL (ref 0–200)
CO2 SERPL-SCNC: 26.4 MMOL/L (ref 22–29)
CREAT SERPL-MCNC: 0.78 MG/DL (ref 0.57–1)
DEPRECATED RDW RBC AUTO: 40.6 FL (ref 37–54)
EGFRCR SERPLBLD CKD-EPI 2021: 84.9 ML/MIN/1.73
EOSINOPHIL # BLD AUTO: 0.13 10*3/MM3 (ref 0–0.4)
EOSINOPHIL NFR BLD AUTO: 2.3 % (ref 0.3–6.2)
ERYTHROCYTE [DISTWIDTH] IN BLOOD BY AUTOMATED COUNT: 12.4 % (ref 12.3–15.4)
GLOBULIN UR ELPH-MCNC: 3.3 GM/DL
GLUCOSE SERPL-MCNC: 92 MG/DL (ref 65–99)
HCT VFR BLD AUTO: 42.2 % (ref 34–46.6)
HDLC SERPL-MCNC: 42 MG/DL (ref 40–60)
HGB BLD-MCNC: 14.5 G/DL (ref 12–15.9)
IMM GRANULOCYTES # BLD AUTO: 0.01 10*3/MM3 (ref 0–0.05)
IMM GRANULOCYTES NFR BLD AUTO: 0.2 % (ref 0–0.5)
LDLC SERPL CALC-MCNC: 94 MG/DL (ref 0–100)
LDLC/HDLC SERPL: 2.04 {RATIO}
LYMPHOCYTES # BLD AUTO: 2.05 10*3/MM3 (ref 0.7–3.1)
LYMPHOCYTES NFR BLD AUTO: 35.5 % (ref 19.6–45.3)
MCH RBC QN AUTO: 30.5 PG (ref 26.6–33)
MCHC RBC AUTO-ENTMCNC: 34.4 G/DL (ref 31.5–35.7)
MCV RBC AUTO: 88.7 FL (ref 79–97)
MONOCYTES # BLD AUTO: 0.4 10*3/MM3 (ref 0.1–0.9)
MONOCYTES NFR BLD AUTO: 6.9 % (ref 5–12)
NEUTROPHILS NFR BLD AUTO: 3.14 10*3/MM3 (ref 1.7–7)
NEUTROPHILS NFR BLD AUTO: 54.4 % (ref 42.7–76)
NRBC BLD AUTO-RTO: 0 /100 WBC (ref 0–0.2)
PLATELET # BLD AUTO: 207 10*3/MM3 (ref 140–450)
PMV BLD AUTO: 12.3 FL (ref 6–12)
POTASSIUM SERPL-SCNC: 4.3 MMOL/L (ref 3.5–5.2)
PROT SERPL-MCNC: 7.9 G/DL (ref 6–8.5)
RBC # BLD AUTO: 4.76 10*6/MM3 (ref 3.77–5.28)
SODIUM SERPL-SCNC: 140 MMOL/L (ref 136–145)
TRIGL SERPL-MCNC: 271 MG/DL (ref 0–150)
VLDLC SERPL-MCNC: 46 MG/DL (ref 5–40)
WBC NRBC COR # BLD AUTO: 5.77 10*3/MM3 (ref 3.4–10.8)

## 2025-04-14 PROCEDURE — 82306 VITAMIN D 25 HYDROXY: CPT | Performed by: FAMILY MEDICINE

## 2025-04-14 PROCEDURE — 80053 COMPREHEN METABOLIC PANEL: CPT | Performed by: FAMILY MEDICINE

## 2025-04-14 PROCEDURE — 80061 LIPID PANEL: CPT | Performed by: FAMILY MEDICINE

## 2025-04-14 PROCEDURE — 85025 COMPLETE CBC W/AUTO DIFF WBC: CPT | Performed by: FAMILY MEDICINE

## 2025-05-15 ENCOUNTER — HOSPITAL ENCOUNTER (OUTPATIENT)
Dept: BONE DENSITY | Facility: HOSPITAL | Age: 65
Discharge: HOME | End: 2025-05-15
Admitting: FAMILY MEDICINE
Payer: MEDICARE

## 2025-05-15 DIAGNOSIS — M81.0 OSTEOPOROSIS, UNSPECIFIED OSTEOPOROSIS TYPE, UNSPECIFIED PATHOLOGICAL FRACTURE PRESENCE: ICD-10-CM

## 2025-05-15 PROCEDURE — 77080 DXA BONE DENSITY AXIAL: CPT

## 2025-05-27 ENCOUNTER — HOSPITAL ENCOUNTER (OUTPATIENT)
Dept: MAMMOGRAPHY | Facility: HOSPITAL | Age: 65
Discharge: HOME | End: 2025-05-27
Admitting: FAMILY MEDICINE
Payer: MEDICARE

## 2025-05-27 ENCOUNTER — OFFICE VISIT (OUTPATIENT)
Dept: FAMILY MEDICINE CLINIC | Facility: CLINIC | Age: 65
End: 2025-05-27
Payer: MEDICARE

## 2025-05-27 VITALS
WEIGHT: 128 LBS | BODY MASS INDEX: 22.68 KG/M2 | HEIGHT: 63 IN | HEART RATE: 82 BPM | TEMPERATURE: 98.7 F | OXYGEN SATURATION: 97 % | SYSTOLIC BLOOD PRESSURE: 92 MMHG | DIASTOLIC BLOOD PRESSURE: 54 MMHG

## 2025-05-27 DIAGNOSIS — M54.42 CHRONIC MIDLINE LOW BACK PAIN WITH LEFT-SIDED SCIATICA: Primary | ICD-10-CM

## 2025-05-27 DIAGNOSIS — Z12.31 BREAST CANCER SCREENING BY MAMMOGRAM: ICD-10-CM

## 2025-05-27 DIAGNOSIS — E78.2 MIXED HYPERLIPIDEMIA: ICD-10-CM

## 2025-05-27 DIAGNOSIS — G89.29 CHRONIC MIDLINE LOW BACK PAIN WITH LEFT-SIDED SCIATICA: Primary | ICD-10-CM

## 2025-05-27 DIAGNOSIS — M81.0 AGE-RELATED OSTEOPOROSIS WITHOUT CURRENT PATHOLOGICAL FRACTURE: ICD-10-CM

## 2025-05-27 PROCEDURE — 99214 OFFICE O/P EST MOD 30 MIN: CPT | Performed by: FAMILY MEDICINE

## 2025-05-27 PROCEDURE — 77067 SCR MAMMO BI INCL CAD: CPT

## 2025-05-27 PROCEDURE — G2211 COMPLEX E/M VISIT ADD ON: HCPCS | Performed by: FAMILY MEDICINE

## 2025-05-27 PROCEDURE — 1125F AMNT PAIN NOTED PAIN PRSNT: CPT | Performed by: FAMILY MEDICINE

## 2025-05-27 PROCEDURE — 77063 BREAST TOMOSYNTHESIS BI: CPT

## 2025-05-27 NOTE — PROGRESS NOTES
Chief Complaint  Follow-up (Tried the sertraline, felt shaky and headache. Stopped taking it and felt better./Would like to review labs. )    Rey Rinaldi presents to [facility]  History of Present Illness  The patient presents for evaluation of osteoporosis, elevated triglycerides, and adverse reaction to medication.    She reports experiencing tremors and throbbing headaches following the administration of sertraline. These symptoms persisted even after a two-day interval between doses. Despite these adverse effects, she notes an overall improvement in her condition, although not to full health. She has been engaging in painting as a therapeutic activity.    She expresses concern regarding her elevated triglyceride levels, which were recorded at 207 during her last assessment.    She has been receiving Prolia injections twice annually for osteoporosis. She also reports occasional numbness and weakness in her legs, particularly when lying on her left side. She does not experience any associated knee pain or back issues.        Medical History: has a past medical history of Abnormal bone density screening (10/15/2014), Acid reflux, Allergic (10year), Allergic rhinitis, Ankle sprain, Arthritis (07/15/2021), Arthritis of neck, Bursitis of hip, Cervical disc disorder, Colon polyp (2021), Coronary artery disease (2018), Fatty liver (2022), Headache (06/01/1993), Heart disease, Hip arthrosis, Hyperlipemia, Low back strain, Osteopenia, Osteoporosis (10/15/2014), Palpitations, Rotator cuff syndrome, Seasonal allergies, Shoulder pain, Thoracic disc disorder, and Wrist sprain.   Surgical History: has a past surgical history that includes Colonoscopy (10/2015); Shoulder surgery (Right, 2014); Esophagogastroduodenoscopy (N/A, 07/15/2021); Colonoscopy (N/A, 07/15/2021); Upper gastrointestinal endoscopy (Dec 2023); Esophagogastroduodenoscopy (N/A, 12/21/2023); and Colonoscopy  "(N/A, 12/21/2023).   Family History: family history includes Arthritis in her mother; Breast cancer in her sister and another family member; Cancer in her mother; Colon cancer (age of onset: 65) in her mother; Heart attack in her mother; Heart disease in her father; Rheumatologic disease in her mother; Stroke in an other family member.   Social History: reports that she quit smoking about 39 years ago. Her smoking use included cigarettes. She started smoking about 44 years ago. She has a 1.3 pack-year smoking history. She has been exposed to tobacco smoke. She has never used smokeless tobacco. She reports that she does not currently use alcohol. She reports that she does not use drugs.  Immunization History   Administered Date(s) Administered    COVID-19 (PFIZER) Purple Cap Monovalent 03/16/2021, 04/06/2021, 12/21/2021    Fluzone  >6mos 10/07/2024    Fluzone (or Fluarix & Flulaval for VFC) >6mos 10/13/2022, 01/26/2024    Fluzone Quad >6mos (Multi-dose) 10/01/2020    Influenza, Unspecified 10/01/2020    Pneumococcal Conjugate 20-Valent (PCV20) 01/26/2024    Shingrix 05/25/2021, 09/08/2021       Objective   Vital Signs:  BP 92/54   Pulse 82   Temp 98.7 °F (37.1 °C) (Oral)   Ht 160 cm (63\")   Wt 58.1 kg (128 lb)   SpO2 97%   BMI 22.67 kg/m²   Estimated body mass index is 22.67 kg/m² as calculated from the following:    Height as of this encounter: 160 cm (63\").    Weight as of this encounter: 58.1 kg (128 lb).     BMI is within normal parameters. No other follow-up for BMI required.      ROS:  Review of Systems   Constitutional:  Negative for fatigue and fever.   HENT:  Negative for congestion, ear pain and sinus pressure.    Respiratory:  Negative for cough, chest tightness and shortness of breath.    Cardiovascular:  Negative for chest pain, palpitations and leg swelling.   Gastrointestinal:  Negative for abdominal pain and diarrhea.   Genitourinary:  Negative for dysuria and frequency.   Neurological:  " Negative for speech difficulty, headache and confusion.   Psychiatric/Behavioral:  Negative for agitation and behavioral problems.       Physical Exam  Vitals reviewed.   Constitutional:       Appearance: Normal appearance.   HENT:      Right Ear: Tympanic membrane normal.      Left Ear: Tympanic membrane normal.      Nose: Nose normal.   Eyes:      Extraocular Movements: Extraocular movements intact.      Conjunctiva/sclera: Conjunctivae normal.      Pupils: Pupils are equal, round, and reactive to light.   Cardiovascular:      Rate and Rhythm: Normal rate and regular rhythm.   Pulmonary:      Effort: Pulmonary effort is normal.      Breath sounds: Normal breath sounds.   Abdominal:      General: Bowel sounds are normal.   Musculoskeletal:         General: Normal range of motion.      Cervical back: Normal range of motion.   Skin:     General: Skin is warm and dry.   Neurological:      General: No focal deficit present.      Mental Status: She is alert and oriented to person, place, and time.   Psychiatric:         Mood and Affect: Mood normal.         Behavior: Behavior normal.       Physical Exam  Respiratory: Clear to auscultation, no wheezing, rales or rhonchi  Cardiovascular: Regular rate and rhythm, no murmurs, rubs, or gallops      Result Review   The following data was reviewed by: Lorri White MD on 05/27/2025:  Common labs          12/16/2024    12:41 1/9/2025    12:45 4/14/2025    10:03   Common Labs   Glucose 104   92    BUN 10   8    Creatinine 0.74   0.78    Sodium 141   140    Potassium 4.3   4.3    Chloride 105   103    Calcium 9.9   9.7    Albumin 4.7   4.6    Total Bilirubin 0.4   0.6    Alkaline Phosphatase 51   50    AST (SGOT) 22   25    ALT (SGPT) 20   28    WBC   5.77    Hemoglobin   14.5    Hematocrit   42.2    Platelets   207    Total Cholesterol   182    Triglycerides   271    HDL Cholesterol   42    LDL Cholesterol    94    Uric Acid  4.3       Results  Labs   - Triglycerides: 207    - Kidney function: Normal   - Blood glucose: Normal    Imaging   - Bone density test: Shows improvement in osteoporosis, with increased strength in the thigh bone by 3.4%.             Assessment and Plan     Diagnoses and all orders for this visit:    1. Chronic midline low back pain with left-sided sciatica (Primary)  -     MRI Lumbar Spine Without Contrast; Future  -     EMG & Nerve Conduction Test; Future    2. Age-related osteoporosis without current pathological fracture    3. Mixed hyperlipidemia      Assessment & Plan  1. Adverse reaction to sertraline.  - Reported feeling shaky and experiencing headaches after taking sertraline.  - Advised to discontinue the use of sertraline due to these adverse effects.  - Symptoms improved after stopping the medication.  - Will notify the provider if symptoms persist.    2. Elevated triglycerides.  - Triglyceride levels noted to be 207.  - Levels can be influenced by dietary intake, particularly sweets.  - Counseled to moderate consumption of sweet foods to help manage triglyceride levels.  - Other labs, including kidney function and sugar levels, were normal.    3. Osteoporosis.  - Bone density has shown improvement with a 3.4% increase in strength.  - Back is normal, but thigh bone density is still low at -2.4.  - Advised to continue Prolia injections and maintain intake of calcium and vitamin D supplements.  - Reports occasional numbness and weakness in the leg, which will be monitored.       I spent 35 minutes caring for Tae on this date of service. This time includes time spent by me in the following activities:reviewing tests  Follow Up   Return in about 3 months (around 8/27/2025).  Patient was given instructions and counseling regarding her condition or for health maintenance advice. Please see specific information pulled into the AVS if appropriate.   Patient or patient representative verbalized consent for the use of Ambient Listening during the visit with   Lorri White MD for chart documentation. 6/9/2025  23:43 EDT    Lorri White MD

## 2025-06-09 DIAGNOSIS — M81.0 AGE-RELATED OSTEOPOROSIS WITHOUT CURRENT PATHOLOGICAL FRACTURE: Primary | ICD-10-CM

## 2025-06-16 ENCOUNTER — HOSPITAL ENCOUNTER (OUTPATIENT)
Dept: INFUSION THERAPY | Facility: HOSPITAL | Age: 65
Discharge: HOME | End: 2025-06-16
Admitting: FAMILY MEDICINE
Payer: MEDICARE

## 2025-06-16 VITALS
HEIGHT: 63 IN | SYSTOLIC BLOOD PRESSURE: 100 MMHG | RESPIRATION RATE: 18 BRPM | DIASTOLIC BLOOD PRESSURE: 66 MMHG | OXYGEN SATURATION: 99 % | BODY MASS INDEX: 22.62 KG/M2 | TEMPERATURE: 97.4 F | HEART RATE: 67 BPM | WEIGHT: 127.65 LBS

## 2025-06-16 DIAGNOSIS — M81.0 AGE-RELATED OSTEOPOROSIS WITHOUT CURRENT PATHOLOGICAL FRACTURE: Primary | ICD-10-CM

## 2025-06-16 LAB
25(OH)D3 SERPL-MCNC: 48.2 NG/ML (ref 30–100)
ALBUMIN SERPL-MCNC: 4.6 G/DL (ref 3.5–5.2)
ALBUMIN/GLOB SERPL: 1.6 G/DL
ALP SERPL-CCNC: 59 U/L (ref 39–117)
ALT SERPL W P-5'-P-CCNC: 21 U/L (ref 1–33)
ANION GAP SERPL CALCULATED.3IONS-SCNC: 12.1 MMOL/L (ref 5–15)
AST SERPL-CCNC: 20 U/L (ref 1–32)
BILIRUB SERPL-MCNC: 0.3 MG/DL (ref 0–1.2)
BUN SERPL-MCNC: 9.2 MG/DL (ref 8–23)
BUN/CREAT SERPL: 13.7 (ref 7–25)
CALCIUM SPEC-SCNC: 9.1 MG/DL (ref 8.6–10.5)
CHLORIDE SERPL-SCNC: 104 MMOL/L (ref 98–107)
CO2 SERPL-SCNC: 23.9 MMOL/L (ref 22–29)
CREAT SERPL-MCNC: 0.67 MG/DL (ref 0.57–1)
EGFRCR SERPLBLD CKD-EPI 2021: 97.7 ML/MIN/1.73
GLOBULIN UR ELPH-MCNC: 2.9 GM/DL
GLUCOSE SERPL-MCNC: 115 MG/DL (ref 65–99)
MAGNESIUM SERPL-MCNC: 2.1 MG/DL (ref 1.6–2.4)
PHOSPHATE SERPL-MCNC: 3.6 MG/DL (ref 2.5–4.5)
POTASSIUM SERPL-SCNC: 3.8 MMOL/L (ref 3.5–5.2)
PROT SERPL-MCNC: 7.5 G/DL (ref 6–8.5)
SODIUM SERPL-SCNC: 140 MMOL/L (ref 136–145)

## 2025-06-16 PROCEDURE — 83735 ASSAY OF MAGNESIUM: CPT | Performed by: FAMILY MEDICINE

## 2025-06-16 PROCEDURE — 82306 VITAMIN D 25 HYDROXY: CPT | Performed by: FAMILY MEDICINE

## 2025-06-16 PROCEDURE — 80053 COMPREHEN METABOLIC PANEL: CPT | Performed by: FAMILY MEDICINE

## 2025-06-16 PROCEDURE — 84100 ASSAY OF PHOSPHORUS: CPT | Performed by: FAMILY MEDICINE

## 2025-06-16 PROCEDURE — 25010000002 DENOSUMAB 60 MG/ML SOLUTION PREFILLED SYRINGE: Performed by: FAMILY MEDICINE

## 2025-06-16 PROCEDURE — 96372 THER/PROPH/DIAG INJ SC/IM: CPT

## 2025-06-16 RX ADMIN — DENOSUMAB 60 MG: 60 INJECTION SUBCUTANEOUS at 16:59

## 2025-07-07 ENCOUNTER — HOSPITAL ENCOUNTER (OUTPATIENT)
Dept: MRI IMAGING | Facility: HOSPITAL | Age: 65
Discharge: HOME | End: 2025-07-07
Payer: MEDICARE

## 2025-07-07 ENCOUNTER — HOSPITAL ENCOUNTER (OUTPATIENT)
Facility: HOSPITAL | Age: 65
Discharge: HOME | End: 2025-07-07
Payer: MEDICARE

## 2025-07-07 DIAGNOSIS — M54.42 CHRONIC MIDLINE LOW BACK PAIN WITH LEFT-SIDED SCIATICA: ICD-10-CM

## 2025-07-07 DIAGNOSIS — G89.29 CHRONIC MIDLINE LOW BACK PAIN WITH LEFT-SIDED SCIATICA: ICD-10-CM

## 2025-07-07 PROCEDURE — 72148 MRI LUMBAR SPINE W/O DYE: CPT

## 2025-07-07 PROCEDURE — 95910 NRV CNDJ TEST 7-8 STUDIES: CPT

## 2025-07-07 PROCEDURE — 95885 MUSC TST DONE W/NERV TST LIM: CPT

## 2025-08-01 ENCOUNTER — OFFICE VISIT (OUTPATIENT)
Dept: FAMILY MEDICINE CLINIC | Facility: CLINIC | Age: 65
End: 2025-08-01
Payer: MEDICARE

## 2025-08-01 VITALS
TEMPERATURE: 97.7 F | OXYGEN SATURATION: 99 % | HEIGHT: 63 IN | DIASTOLIC BLOOD PRESSURE: 72 MMHG | SYSTOLIC BLOOD PRESSURE: 110 MMHG | BODY MASS INDEX: 22.48 KG/M2 | WEIGHT: 126.9 LBS | HEART RATE: 63 BPM

## 2025-08-01 DIAGNOSIS — M65.352 TRIGGER LITTLE FINGER OF LEFT HAND: ICD-10-CM

## 2025-08-01 DIAGNOSIS — G89.29 CHRONIC MIDLINE LOW BACK PAIN WITH LEFT-SIDED SCIATICA: ICD-10-CM

## 2025-08-01 DIAGNOSIS — D12.6 ADENOMATOUS POLYP OF COLON, UNSPECIFIED PART OF COLON: ICD-10-CM

## 2025-08-01 DIAGNOSIS — K31.A0 INTESTINAL METAPLASIA OF GASTRIC MUCOSA: ICD-10-CM

## 2025-08-01 DIAGNOSIS — K21.00 GASTROESOPHAGEAL REFLUX DISEASE WITH ESOPHAGITIS WITHOUT HEMORRHAGE: ICD-10-CM

## 2025-08-01 DIAGNOSIS — M48.061 SPINAL STENOSIS OF LUMBAR REGION, UNSPECIFIED WHETHER NEUROGENIC CLAUDICATION PRESENT: ICD-10-CM

## 2025-08-01 DIAGNOSIS — M54.42 CHRONIC MIDLINE LOW BACK PAIN WITH LEFT-SIDED SCIATICA: ICD-10-CM

## 2025-08-01 DIAGNOSIS — M19.042 ARTHRITIS OF FINGER OF LEFT HAND: ICD-10-CM

## 2025-08-01 DIAGNOSIS — E78.2 MIXED HYPERLIPIDEMIA: Primary | ICD-10-CM

## 2025-08-01 RX ORDER — MELOXICAM 7.5 MG/1
7.5 TABLET ORAL DAILY
Qty: 90 TABLET | Refills: 0 | Status: SHIPPED | OUTPATIENT
Start: 2025-08-01 | End: 2025-10-30

## 2025-08-04 ENCOUNTER — CLINICAL SUPPORT (OUTPATIENT)
Dept: FAMILY MEDICINE CLINIC | Facility: CLINIC | Age: 65
End: 2025-08-04
Payer: MEDICARE

## 2025-08-04 DIAGNOSIS — M81.0 AGE-RELATED OSTEOPOROSIS WITHOUT CURRENT PATHOLOGICAL FRACTURE: ICD-10-CM

## 2025-08-04 LAB
25(OH)D3 SERPL-MCNC: 50.7 NG/ML (ref 30–100)
ALBUMIN SERPL-MCNC: 4.5 G/DL (ref 3.5–5.2)
ALBUMIN/GLOB SERPL: 1.5 G/DL
ALP SERPL-CCNC: 40 U/L (ref 39–117)
ALT SERPL W P-5'-P-CCNC: 18 U/L (ref 1–33)
ANION GAP SERPL CALCULATED.3IONS-SCNC: 9.4 MMOL/L (ref 5–15)
AST SERPL-CCNC: 20 U/L (ref 1–32)
BILIRUB SERPL-MCNC: 0.5 MG/DL (ref 0–1.2)
BUN SERPL-MCNC: 13.9 MG/DL (ref 8–23)
BUN/CREAT SERPL: 19.6 (ref 7–25)
CALCIUM SPEC-SCNC: 9.3 MG/DL (ref 8.6–10.5)
CHLORIDE SERPL-SCNC: 105 MMOL/L (ref 98–107)
CO2 SERPL-SCNC: 24.6 MMOL/L (ref 22–29)
CREAT SERPL-MCNC: 0.71 MG/DL (ref 0.57–1)
EGFRCR SERPLBLD CKD-EPI 2021: 95.1 ML/MIN/1.73
GLOBULIN UR ELPH-MCNC: 3 GM/DL
GLUCOSE SERPL-MCNC: 99 MG/DL (ref 65–99)
MAGNESIUM SERPL-MCNC: 2.3 MG/DL (ref 1.6–2.4)
PHOSPHATE SERPL-MCNC: 3.2 MG/DL (ref 2.5–4.5)
POTASSIUM SERPL-SCNC: 4.3 MMOL/L (ref 3.5–5.2)
PROT SERPL-MCNC: 7.5 G/DL (ref 6–8.5)
SODIUM SERPL-SCNC: 139 MMOL/L (ref 136–145)

## 2025-08-04 PROCEDURE — 36415 COLL VENOUS BLD VENIPUNCTURE: CPT | Performed by: FAMILY MEDICINE

## 2025-08-04 PROCEDURE — 84100 ASSAY OF PHOSPHORUS: CPT | Performed by: FAMILY MEDICINE

## 2025-08-04 PROCEDURE — 80053 COMPREHEN METABOLIC PANEL: CPT | Performed by: FAMILY MEDICINE

## 2025-08-04 PROCEDURE — 83735 ASSAY OF MAGNESIUM: CPT | Performed by: FAMILY MEDICINE

## 2025-08-04 PROCEDURE — 82306 VITAMIN D 25 HYDROXY: CPT | Performed by: FAMILY MEDICINE

## (undated) DEVICE — SINGLE-USE BIOPSY FORCEPS: Brand: RADIAL JAW 4

## (undated) DEVICE — EGD OR ERCP KIT: Brand: MEDLINE INDUSTRIES, INC.

## (undated) DEVICE — CONN JET HYDRA H20 AUXILIARY DISP

## (undated) DEVICE — COLON KIT: Brand: MEDLINE INDUSTRIES, INC.

## (undated) DEVICE — SOL IRR NACL 0.9PCT BT 1000ML

## (undated) DEVICE — SNAR POLYP CAPTIFLEX XS/OVL 11X2.4MM 240CM 1P/U

## (undated) DEVICE — SOLIDIFIER LIQLOC PLS 1500CC BT

## (undated) DEVICE — SOL IRRG H2O PL/BG 1000ML STRL

## (undated) DEVICE — THE SINGLE USE ETRAP – POLYP TRAP IS USED FOR SUCTION RETRIEVAL OF ENDOSCOPICALLY REMOVED POLYPS.: Brand: ETRAP

## (undated) DEVICE — Device

## (undated) DEVICE — LINER SURG CANSTR SXN S/RIGD 1500CC

## (undated) DEVICE — SNAR E/S POLYP SNAREMASTER OVL/10MM 2.8X2300MM YEL

## (undated) DEVICE — Device: Brand: DEFENDO AIR/WATER/SUCTION AND BIOPSY VALVE

## (undated) DEVICE — BLCK/BITE BLOX WO/DENTL/RIM W/STRAP 54F